# Patient Record
Sex: FEMALE | Race: WHITE | NOT HISPANIC OR LATINO | Employment: UNEMPLOYED | URBAN - NONMETROPOLITAN AREA
[De-identification: names, ages, dates, MRNs, and addresses within clinical notes are randomized per-mention and may not be internally consistent; named-entity substitution may affect disease eponyms.]

---

## 2017-02-27 ENCOUNTER — TELEPHONE (OUTPATIENT)
Dept: FAMILY MEDICINE | Facility: OTHER | Age: 47
End: 2017-02-27

## 2017-02-27 DIAGNOSIS — G89.29 CHRONIC BILATERAL LOW BACK PAIN WITHOUT SCIATICA: Primary | ICD-10-CM

## 2017-02-27 DIAGNOSIS — M54.50 CHRONIC BILATERAL LOW BACK PAIN WITHOUT SCIATICA: Primary | ICD-10-CM

## 2017-02-27 NOTE — TELEPHONE ENCOUNTER
Orders placed.  Is this allowed?  Please contact patient if not.  Please advise her that I don't typically fax over the referral unless we have a number.  Electronically signed:    Tanvir Ybarra PA-C

## 2017-02-27 NOTE — TELEPHONE ENCOUNTER
Reason for Call: Request for an order or referral: [facility]  Order or referral being requested: referral   Date needed: at your convenience    Has the patient been seen by the PCP for this problem? YES    Additional comments: no    Phone number Patient can be reached at:  Home number on file [phone] (home)    Best Time:  Anytime    Can we leave a detailed message on this number?  YES    Call taken on 2/27/2017 at 4:28 PM by Eunice Simmons

## 2017-03-02 NOTE — TELEPHONE ENCOUNTER
Sandee can you please fax the information over to [facility].  Fax [phone]  Please reference [facility] Case #[id number]

## 2017-03-08 ENCOUNTER — TRANSFERRED RECORDS (OUTPATIENT)
Dept: HEALTH INFORMATION MANAGEMENT | Facility: CLINIC | Age: 47
End: 2017-03-08

## 2017-04-27 ENCOUNTER — OFFICE VISIT (OUTPATIENT)
Dept: URGENT CARE | Facility: RETAIL CLINIC | Age: 47
End: 2017-04-27
Payer: COMMERCIAL

## 2017-04-27 VITALS — TEMPERATURE: 98.1 F | DIASTOLIC BLOOD PRESSURE: 79 MMHG | HEART RATE: 74 BPM | SYSTOLIC BLOOD PRESSURE: 125 MMHG

## 2017-04-27 DIAGNOSIS — J02.9 ACUTE PHARYNGITIS, UNSPECIFIED ETIOLOGY: Primary | ICD-10-CM

## 2017-04-27 DIAGNOSIS — J00 COMMON COLD: ICD-10-CM

## 2017-04-27 DIAGNOSIS — J30.2 SEASONAL ALLERGIC RHINITIS, UNSPECIFIED ALLERGIC RHINITIS TRIGGER: ICD-10-CM

## 2017-04-27 LAB — S PYO AG THROAT QL IA.RAPID: NORMAL

## 2017-04-27 PROCEDURE — 87880 STREP A ASSAY W/OPTIC: CPT | Mod: QW | Performed by: NURSE PRACTITIONER

## 2017-04-27 PROCEDURE — 87081 CULTURE SCREEN ONLY: CPT | Performed by: NURSE PRACTITIONER

## 2017-04-27 PROCEDURE — 99213 OFFICE O/P EST LOW 20 MIN: CPT | Performed by: NURSE PRACTITIONER

## 2017-04-27 NOTE — NURSING NOTE
"Chief Complaint   Patient presents with     Throat Pain     2 days; hard to swallow     Generalized Body Aches     neck; sore, stiff     Fatigue     feeks very worn down       Initial /79 (BP Location: Left arm)  Pulse 74  Temp 98.1  F (36.7  C) (Oral) Estimated body mass index is 39.09 kg/(m^2) as calculated from the following:    Height as of 9/22/16: 5' 6.1\" (1.679 m).    Weight as of 9/22/16: 242 lb 14.4 oz (110.2 kg).  Medication Reconciliation: complete  "
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
## 2017-04-27 NOTE — MR AVS SNAPSHOT
"              After Visit Summary   2017    Kesha David    MRN: [id number]           Patient Information     Date Of Birth          1970        Visit Information        Provider Department      2017 3:10 PM Cornelius Solitario APRN [facility]        Today's Diagnoses     Acute pharyngitis, unspecified etiology    -  1    Seasonal allergic rhinitis, unspecified allergic rhinitis trigger        Common cold           Follow-ups after your visit        Who to contact     You can reach your care team any time of the day by calling [phone].  Notification of test results:  If you have an abnormal lab result, we will notify you by phone as soon as possible.         Additional Information About Your Visit        MyChart Information     [email or web address] lets you send messages to your doctor, view your test results, renew your prescriptions, schedule appointments and more. To sign up, go to [email or web address] . Click on \"Log in\" on the left side of the screen, which will take you to the Welcome page. Then click on \"Sign up Now\" on the right side of the page.     You will be asked to enter the access code listed below, as well as some personal information. Please follow the directions to create your username and password.     Your access code is: [email or web address]  Expires: 2017  4:04 PM     Your access code will  in 90 days. If you need help or a new code, please call your [place] clinic or [phone].        Care EveryWhere ID     This is your [facility] EveryWhere ID. This could be used by other organizations to access your [place] medical records  [id number]        Your Vitals Were     Pulse Temperature                74 98.1  F (36.7  C) (Oral)           Blood Pressure from Last 3 Encounters:   17 125/79   16 114/60   16 122/60    Weight from Last 3 Encounters:   16 242 lb 14.4 oz (110.2 kg)   16 242 lb (109.8 kg)   16 239 lb (108.4 kg) "              We Performed the Following     BETA STREP GROUP A R/O CULTURE     RAPID STREP SCREEN        Primary Care Provider Office Phone # Fax #    Tanvir Peterson PA-C [phone]       [facility] [address] [facility] [address]        Thank you!     Thank you for choosing [facility]  for your care. Our goal is always to provide you with excellent care. Hearing back from our patients is one way we can continue to improve our services. Please take a few minutes to complete the written survey that you may receive in the mail after your visit with us. Thank you!             Your Updated Medication List - Protect others around you: Learn how to safely use, store and throw away your medicines at www.disposemymeds.org.          This list is accurate as of: 4/27/17  4:04 PM.  Always use your most recent med list.                   Brand Name Dispense Instructions for use    ** PATIENT ALERT **     0    Warning:  All pain medication refills must be approved by MD.       albuterol 108 (90 BASE) MCG/ACT Inhaler    PROAIR HFA/PROVENTIL HFA/VENTOLIN HFA    1 Inhaler    Inhale 1-2 puffs into the lungs every 4 hours as needed for wheezing       Biotin 1 MG Caps      Reported on 4/27/2017       cetirizine 10 MG tablet    zyrTEC     Take 10 mg by mouth daily       CYANOCOBALAMIN SL      Place 500 mcg under the tongue daily Reported on 4/27/2017       DULERA 100-5 MCG/ACT oral inhaler   Generic drug:  mometasone-formoterol      Reported on 4/27/2017       fluticasone 50 MCG/ACT spray    FLONASE    1 Package    Spray 2 sprays into both nostrils daily       multivitamin, therapeutic with minerals Tabs tablet      Take 2 tablets by mouth daily Reported on 4/27/2017       omeprazole 40 MG capsule    priLOSEC    30 capsule    Take 1 capsule (40 mg) by mouth daily       venlafaxine 37.5 MG 24 hr capsule    EFFEXOR-XR    90 capsule    Take 1 capsule (37.5 mg) by mouth daily

## 2017-04-30 LAB — BETA STREP CONFIRM: NORMAL

## 2017-07-31 ENCOUNTER — OFFICE VISIT (OUTPATIENT)
Dept: FAMILY MEDICINE | Facility: OTHER | Age: 47
End: 2017-07-31
Payer: COMMERCIAL

## 2017-07-31 VITALS
TEMPERATURE: 96.5 F | RESPIRATION RATE: 16 BRPM | HEART RATE: 76 BPM | WEIGHT: 237.3 LBS | SYSTOLIC BLOOD PRESSURE: 130 MMHG | DIASTOLIC BLOOD PRESSURE: 90 MMHG | BODY MASS INDEX: 38.14 KG/M2 | HEIGHT: 66 IN

## 2017-07-31 DIAGNOSIS — F33.0 MAJOR DEPRESSIVE DISORDER, RECURRENT EPISODE, MILD (H): Primary | ICD-10-CM

## 2017-07-31 DIAGNOSIS — K21.00 GASTROESOPHAGEAL REFLUX DISEASE WITH ESOPHAGITIS: ICD-10-CM

## 2017-07-31 DIAGNOSIS — M54.50 ACUTE BILATERAL LOW BACK PAIN WITHOUT SCIATICA: ICD-10-CM

## 2017-07-31 DIAGNOSIS — M79.10 GENERALIZED MUSCLE ACHE: ICD-10-CM

## 2017-07-31 DIAGNOSIS — Z98.84 BARIATRIC SURGERY STATUS: ICD-10-CM

## 2017-07-31 LAB
ALBUMIN SERPL-MCNC: 3.7 G/DL (ref 3.4–5)
ALP SERPL-CCNC: 65 U/L (ref 40–150)
ALT SERPL W P-5'-P-CCNC: 21 U/L (ref 0–50)
ANION GAP SERPL CALCULATED.3IONS-SCNC: 7 MMOL/L (ref 3–14)
AST SERPL W P-5'-P-CCNC: 12 U/L (ref 0–45)
BILIRUB SERPL-MCNC: 0.2 MG/DL (ref 0.2–1.3)
BUN SERPL-MCNC: 20 MG/DL (ref 7–30)
CALCIUM SERPL-MCNC: 8.8 MG/DL (ref 8.5–10.1)
CHLORIDE SERPL-SCNC: 112 MMOL/L (ref 94–109)
CO2 SERPL-SCNC: 26 MMOL/L (ref 20–32)
CREAT SERPL-MCNC: 0.74 MG/DL (ref 0.52–1.04)
ERYTHROCYTE [DISTWIDTH] IN BLOOD BY AUTOMATED COUNT: 12.4 % (ref 10–15)
GFR SERPL CREATININE-BSD FRML MDRD: 84 ML/MIN/1.7M2
GLUCOSE SERPL-MCNC: 103 MG/DL (ref 70–99)
HCT VFR BLD AUTO: 37.8 % (ref 35–47)
HGB BLD-MCNC: 13.6 G/DL (ref 11.7–15.7)
MCH RBC QN AUTO: 30.4 PG (ref 26.5–33)
MCHC RBC AUTO-ENTMCNC: 36 G/DL (ref 31.5–36.5)
MCV RBC AUTO: 85 FL (ref 78–100)
PLATELET # BLD AUTO: 235 10E9/L (ref 150–450)
POTASSIUM SERPL-SCNC: 3.6 MMOL/L (ref 3.4–5.3)
PROT SERPL-MCNC: 7.3 G/DL (ref 6.8–8.8)
RBC # BLD AUTO: 4.47 10E12/L (ref 3.8–5.2)
SODIUM SERPL-SCNC: 145 MMOL/L (ref 133–144)
WBC # BLD AUTO: 6.5 10E9/L (ref 4–11)

## 2017-07-31 PROCEDURE — 99213 OFFICE O/P EST LOW 20 MIN: CPT | Performed by: PHYSICIAN ASSISTANT

## 2017-07-31 PROCEDURE — 86038 ANTINUCLEAR ANTIBODIES: CPT | Performed by: PHYSICIAN ASSISTANT

## 2017-07-31 PROCEDURE — 36415 COLL VENOUS BLD VENIPUNCTURE: CPT | Performed by: PHYSICIAN ASSISTANT

## 2017-07-31 PROCEDURE — 86431 RHEUMATOID FACTOR QUANT: CPT | Performed by: PHYSICIAN ASSISTANT

## 2017-07-31 PROCEDURE — 86618 LYME DISEASE ANTIBODY: CPT | Performed by: PHYSICIAN ASSISTANT

## 2017-07-31 PROCEDURE — 85027 COMPLETE CBC AUTOMATED: CPT | Performed by: PHYSICIAN ASSISTANT

## 2017-07-31 PROCEDURE — 80053 COMPREHEN METABOLIC PANEL: CPT | Performed by: PHYSICIAN ASSISTANT

## 2017-07-31 RX ORDER — RABEPRAZOLE SODIUM 20 MG/1
20 TABLET, DELAYED RELEASE ORAL DAILY
Qty: 90 TABLET | Refills: 1 | Status: SHIPPED | OUTPATIENT
Start: 2017-07-31 | End: 2017-12-01

## 2017-07-31 RX ORDER — AMITRIPTYLINE HYDROCHLORIDE 10 MG/1
TABLET ORAL
Qty: 90 TABLET | Refills: 0 | Status: SHIPPED | OUTPATIENT
Start: 2017-07-31 | End: 2017-12-01

## 2017-07-31 ASSESSMENT — PAIN SCALES - GENERAL: PAINLEVEL: MILD PAIN (3)

## 2017-07-31 NOTE — LETTER
[facility]  [address] [facility] [id number] [phone]        August 7, 2017    Kesha David  [address] [address] [place] [id number]          Dear Zo Rebolledo is a copy of your labs from 7-.  We advise that you  drink plenty of water and eliminate salt from your diet.  Please recheck labs in 2 weeks.     If you have any questions or problems, please give us a call at the clinic.    Sincerely,        Tanvir Ovalle PA-C/clau,VIOLETTEN
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
## 2017-07-31 NOTE — NURSING NOTE
"Chief Complaint   Patient presents with     Arthritis     joint pain,x 1 year       Initial /90 (BP Location: Right arm, Patient Position: Chair, Cuff Size: Adult Large)  Pulse 76  Temp 96.5  F (35.8  C) (Oral)  Resp 16  Ht 5' 6\" (1.676 m)  Wt 237 lb 4.8 oz (107.6 kg)  BMI 38.3 kg/m2 Estimated body mass index is 38.3 kg/(m^2) as calculated from the following:    Height as of this encounter: 5' 6\" (1.676 m).    Weight as of this encounter: 237 lb 4.8 oz (107.6 kg).  Medication Reconciliation: complete       Bushra VILLEGAS LPN      "
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
## 2017-07-31 NOTE — LETTER
My Depression Action Plan  Name: Kesha David   Date of Birth 1970  Date: 7/31/2017    My doctor: Tanvir Peterson   My clinic: [place] [address] [facility] [address] [phone]          GREEN    ZONE   Good Control    What it looks like:     Things are going generally well. You have normal up s and down s. You may even feel depressed from time to time, but bad moods usually last less than a day.   What you need to do:  1. Continue to care for yourself (see self care plan)  2. Check your depression survival kit and update it as needed  3. Follow your physician s recommendations including any medication.  4. Do not stop taking medication unless you consult with your physician first.           YELLOW         ZONE Getting Worse    What it looks like:     Depression is starting to interfere with your life.     It may be hard to get out of bed; you may be starting to isolate yourself from others.    Symptoms of depression are starting to last most all day and this has happened for several days.     You may have suicidal thoughts but they are not constant.   What you need to do:     1. Call your care team, your response to treatment will improve if you keep your care team informed of your progress. Yellow periods are signs an adjustment may need to be made.     2. Continue your self-care, even if you have to fake it!    3. Talk to someone in your support network    4. Open up your depression survival kit           RED    ZONE Medical Alert - Get Help    What it looks like:     Depression is seriously interfering with your life.     You may experience these or other symptoms: You can t get out of bed most days, can t work or engage in other necessary activities, you have trouble taking care of basic hygiene, or basic responsibilities, thoughts of suicide or death that will not go away, self-injurious behavior.     What you need to do:  1. Call your care team and request a  same-day appointment. If they are not available (weekends or after hours) call your local crisis line, emergency room or 911.      Electronically signed by: Bushra Perez, July 31, 2017    Depression Self Care Plan / Survival Kit    Self-Care for Depression  Here s the deal. Your body and mind are really not as separate as most people think.  What you do and think affects how you feel and how you feel influences what you do and think. This means if you do things that people who feel good do, it will help you feel better.  Sometimes this is all it takes.  There is also a place for medication and therapy depending on how severe your depression is, so be sure to consult with your medical provider and/ or Behavioral Health Consultant if your symptoms are worsening or not improving.     In order to better manage my stress, I will:    Exercise  Get some form of exercise, every day. This will help reduce pain and release endorphins, the  feel good  chemicals in your brain. This is almost as good as taking antidepressants!  This is not the same as joining a gym and then never going! (they count on that by the way ) It can be as simple as just going for a walk or doing some gardening, anything that will get you moving.      Hygiene   Maintain good hygiene (Get out of bed in the morning, Make your bed, Brush your teeth, Take a shower, and Get dressed like you were going to work, even if you are unemployed).  If your clothes don't fit try to get ones that do.    Diet  I will strive to eat foods that are good for me, drink plenty of water, and avoid excessive sugar, caffeine, alcohol, and other mood-altering substances.  Some foods that are helpful in depression are: complex carbohydrates, B vitamins, flaxseed, fish or fish oil, fresh fruits and vegetables.    Psychotherapy  I agree to participate in Individual Therapy (if recommended).    Medication  If prescribed medications, I agree to take them.  Missing doses can result  in serious side effects.  I understand that drinking alcohol, or other illicit drug use, may cause potential side effects.  I will not stop my medication abruptly without first discussing it with my provider.    Staying Connected With Others  I will stay in touch with my friends, family members, and my primary care provider/team.    Use your imagination  Be creative.  We all have a creative side; it doesn t matter if it s oil painting, sand castles, or mud pies! This will also kick up the endorphins.    Witness Beauty  (AKA stop and smell the roses) Take a look outside, even in mid-winter. Notice colors, textures. Watch the squirrels and birds.     Service to others  Be of service to others.  There is always someone else in need.  By helping others we can  get out of ourselves  and remember the really important things.  This also provides opportunities for practicing all the other parts of the program.    Humor  Laugh and be silly!  Adjust your TV habits for less news and crime-drama and more comedy.    Control your stress  Try breathing deep, massage therapy, biofeedback, and meditation. Find time to relax each day.     My support system    Clinic Contact:  Phone number:    Contact 1:  Phone number:    Contact 2:  Phone number:    [employer]/:  Phone number:    Therapist:  Phone number:    Local crisis center:    Phone number:    Other community support:  Phone number:

## 2017-08-01 LAB — ANA SER QL IA: NORMAL

## 2017-08-01 ASSESSMENT — PATIENT HEALTH QUESTIONNAIRE - PHQ9: SUM OF ALL RESPONSES TO PHQ QUESTIONS 1-9: 0

## 2017-08-02 ENCOUNTER — HOSPITAL ENCOUNTER (OUTPATIENT)
Dept: PHYSICAL THERAPY | Facility: OTHER | Age: 47
Setting detail: THERAPIES SERIES
End: 2017-08-02
Attending: PHYSICIAN ASSISTANT
Payer: COMMERCIAL

## 2017-08-02 LAB — B BURGDOR IGG+IGM SER QL: 0.07 (ref 0–0.89)

## 2017-08-02 PROCEDURE — 97161 PT EVAL LOW COMPLEX 20 MIN: CPT | Mod: GP | Performed by: PHYSICAL THERAPIST

## 2017-08-02 PROCEDURE — 97110 THERAPEUTIC EXERCISES: CPT | Mod: GP | Performed by: PHYSICAL THERAPIST

## 2017-08-02 PROCEDURE — 40000718 ZZHC STATISTIC PT DEPARTMENT ORTHO VISIT: Performed by: PHYSICAL THERAPIST

## 2017-08-02 NOTE — PROGRESS NOTES
08/02/17 1300   General Information   Type of Visit Initial OP Ortho PT Evaluation   Start of Care Date 08/02/17   Referring Physician lyubov DIXON   Patient/Family Goals Statement pain and tightness due to fribro   Orders Evaluate and Treat   Date of Order 07/31/17   Insurance Type Other  (medica)   Medical Diagnosis acute B LBP without sciatica M54.5 generalized muscle ache M79.1   Surgical/Medical history reviewed Yes   Precautions/Limitations no known precautions/limitations   Body Part(s)   Body Part(s) Lumbar Spine/SI   Presentation and Etiology   Pertinent history of current problem (include personal factors and/or comorbidities that impact the POC) patient reports that she was told she has fibro this week . does water areobics  3x and then swims 2-3 x week .  does not like to exercises but tolerates water . feels overall very tight everywhere . sleeps poorly because of pain everywhere. B UE tend to go to sleep when she lays on that side , arms also go asleep when driving . has had PT and chiropractor and only massage helps , has had low back pain since 1996 , back feels frozen    Impairments A. Pain;E. Decreased flexibility   Functional Limitations perform activities of daily living;perform required work activities;perform desired leisure / sports activities   Symptom Location everywhere    Onset date of current episode/exacerbation 05/02/17   Chronicity Chronic   Pain rating (0-10 point scale) Best (/10);Worst (/10)   Best (/10) 0/10   Worst (/10) 8/10   Pain quality A. Sharp;B. Dull;C. Aching   Frequency of pain/symptoms C. With activity   Pain/symptoms exacerbated by K. Home tasks;L. Work tasks;J. ADL   Pain/symptoms eased by C. Rest  (pool , hot tub )   Progression of symptoms since onset: Worsened   Prior Level of Function   Functional Level Prior Comment pool class and stretching    Current Level of Function   Current Community Support Family/friend caregiver   Patient role/employment history A.  Employed   Fall Risk Screen   Fall screen completed by PT   Per patient - Fall 2 or more times in past year? No   Per patient - Fall with injury in past year? No   Is patient a fall risk? No   System Outcome Measures   Outcome Measures Low Back Pain (see Oswestry and Destiny)   Lumbar Spine/SI Objective Findings   Posture forward head and rounded shoulder    Flexion ROM full tight    Extension ROM full tight    Right Side Bending ROM full tight   Left Side Bending ROM full tight    Pelvic Screen neg   Hip Screen neg   Lumbar/Hip/Knee/Foot Strength Comments good strength able to stand on heels and toes   Hamstring Flexibility B 10    SLR neg   Planned Therapy Interventions   Planned Therapy Interventions ADL retraining;strengthening;stretching   Planned Therapy Interventions Comment patient seen for c/o pain and tight everywhere , Rx is instruction in body mechanics and HEP for gentle stretching and core stabilization    Clinical Impression   Criteria for Skilled Therapeutic Interventions Met yes, treatment indicated   PT Diagnosis low back pain , tightness and weakness B UE and LE    Functional limitations due to impairments GOOD 15.56   Clinical Presentation Stable/Uncomplicated   Clinical Decision Making (Complexity) Low complexity   Predicted Duration of Therapy Intervention (days/wks) every other week x 2 months to establish on HEP    Risk & Benefits of therapy have been explained Yes   Patient, Family & other staff in agreement with plan of care Yes   Education Assessment   Preferred Learning Style Listening;Reading;Demonstration   Barriers to Learning No barriers   ORTHO GOALS   PT Ortho Eval Goals 1;2   Ortho Goal 1   Goal Identifier 1   Goal Description patient to be compliant with HEP 5 of 7 days to help manage her symptoms   Target Date 10/02/17   Ortho Goal 2   Goal Identifier 2   Goal Description patient to have overall decrease in pain level currently 0-8/10 goal is 0-4/10   Target Date 10/02/17   Total  Evaluation Time   Total Evaluation Time 15

## 2017-08-02 NOTE — PROGRESS NOTES
08/02/17 1300   [facility] STarT Back Screening Tool (  [facility] 01/08/07, Funded by [employer]) Used with permission   1  My back pain has spread down my leg(s) at some time in the last 2 weeks (!) 1   2  I have had pain in the shoulder or neck at some time in the last 2 weeks (!) 1   3  I have only walked short distances because of my back pain (!) 1   4  In the last 2 weeks, I have dressed more slowly than usual because of back pain (!) 1   5  It s not really safe for a person with a condition like mine to be physically active 0   6  Worrying thoughts have been going through my mind a lot of the time 0   7  I feel that my back pain is terrible and it s never going to get any better 0   8  In general I have not enjoyed all the things I used to enjoy 0   9.  Overall, how bothersome has your back pain been in the last 2 weeks? 1   [facility] STarT Back Tool Scores    Sub-Score (Q5-9) 1   Total Score (all 9) 5   Risk: MEDIUM

## 2017-08-02 NOTE — PROGRESS NOTES
08/02/17 1300   Oswestry Disability Index (GOOD   Clint Sergo 1980 Version 2.1a, All rights reserved)   Section 1 - Pain intensity 0   Section 2 - Personal care (washing, dressing, etc.)  1   Section 3 - Lifting 1   Section 4 - Walking 1   Section 5 - Sitting 1   Section 6 - Standing 1   Section 7 - Sleeping 1  (sleeps on B sides and wakes due to UE falling asleep )   Section 8 - Sex life (if applicable) -1   Section 9 - Social life 0   Section 10 - Traveling 1   Sum 7   Count 9   Oswestry Score (%) 15.56 %

## 2017-08-03 LAB — RHEUMATOID FACT SER NEPH-ACNC: <20 IU/ML (ref 0–20)

## 2017-08-17 ENCOUNTER — TELEPHONE (OUTPATIENT)
Dept: FAMILY MEDICINE | Facility: OTHER | Age: 47
End: 2017-08-17

## 2017-08-17 NOTE — TELEPHONE ENCOUNTER
Reason for Call:  Other regarding medication dosage    Detailed comments: Kesha is currently on amitriptyline 10 mg 3 tablets daily. Kesha said the medication isn't working yet, she has no motivation, still has pain yet, she said she cried a lot with it last week. Kesha is asking if the medication dosage can be increased. Kesha is aware Tanvir is out of clinic and is asking if another provider will address this question in Tanvir's absence. Kesha has to start work next week and want's the medication to be more effective prior to next week.    Phone Number Patient can be reached at: Home number on file [phone] (home)    Best Time:     Can we leave a detailed message on this number? YES    Call taken on 8/17/2017 at 11:27 AM by Monica Petersen

## 2017-08-17 NOTE — TELEPHONE ENCOUNTER
She could gradually increase the amitryptylene to 2 tablets times daily   ( increase by one a day until she gets up to 2 tablets 3 times daily)

## 2017-08-22 NOTE — ADDENDUM NOTE
Encounter addended by: Divya Ibrahim, PT on: 8/22/2017  1:26 PM<BR>     Actions taken: Episode resolved, Sign clinical note

## 2017-08-22 NOTE — PROGRESS NOTES
Outpatient Physical Therapy Discharge Note     Patient: Kesha David  : 1970    Beginning/End Dates of Reporting Period:  2017 to 2017    Referring Provider: lyubov Cho Diagnosis: back pain      Client Self Report:      Objective Measurements:  Objective Measure: pain 0-8/10 mauri med , GOOD 16    Outcome Measures (most recent score):Mauri STarT Sub-Score (Q5-9): 1  Mauri STarT Total Score (all 9): 5  Oswestry Score (%): 15.56 %      Goals:  Goal Identifier 1   Goal Description patient to be compliant with HEP 5 of 7 days to help manage her symptoms   Target Date 10/02/17   Date Met      Progress:     Goal Identifier 2   Goal Description patient to have overall decrease in pain level currently 0-8/10 goal is 0-4/10   Target Date 10/02/17   Date Met      Progress:       Progress Toward Goals:   Progress this reporting period: patient seen for evaluation and instruction in HEP , she called and cancelled her follow up on 2017 and as of 2017 she has made no further contact with PT             Plan:  Discharge from therapy. If patient should call in the next 1 month will resume therapy     Discharge:    Reason for Discharge: Patient chooses to discontinue therapy.  Patient has failed to schedule further appointments.    Equipment Issued: none    Discharge Plan: Patient to continue home program.

## 2017-10-19 DIAGNOSIS — J98.01 ACUTE BRONCHOSPASM: ICD-10-CM

## 2017-10-19 RX ORDER — ALBUTEROL SULFATE 90 UG/1
1-2 AEROSOL, METERED RESPIRATORY (INHALATION) EVERY 4 HOURS PRN
Qty: 1 INHALER | Refills: 0 | Status: SHIPPED | OUTPATIENT
Start: 2017-10-19 | End: 2017-11-01

## 2017-10-19 NOTE — TELEPHONE ENCOUNTER
Ventolin      Last Written Prescription Date: 2015  Last Fill Quantity: 18,  # refills: 0   Last Office Visit with [facility], [facility] or [facility] prescribing provider: 07/31/17    Patient is also requesting an RX for Dulera.  She previously got this from her allergist but would you to fill.    Thanks,  Merary Armendariz, Technician  [facility]  [phone]

## 2017-10-31 ENCOUNTER — TELEPHONE (OUTPATIENT)
Dept: FAMILY MEDICINE | Facility: OTHER | Age: 47
End: 2017-10-31

## 2017-10-31 NOTE — TELEPHONE ENCOUNTER
Reason for Call:  Same Day Appointment, Requested Provider:  Tanvir Peterson    PCP: Tanvir Peterson    Reason for visit: allergies sore throat body pain    Duration of symptoms: 2 weeks    Have you been treated for this in the past?allergies yes, sore throat and pain no    Additional comments: is wondering if she could be worked in today with Tanvir. declined another provider    Can we leave a detailed message on this number? YES    Phone number patient can be reached at: Home number on file [phone] (home)    Best Time: any    Call taken on 10/31/2017 at 1:59 PM by Gabriela Cabrera

## 2017-11-01 ENCOUNTER — OFFICE VISIT (OUTPATIENT)
Dept: FAMILY MEDICINE | Facility: OTHER | Age: 47
End: 2017-11-01
Payer: COMMERCIAL

## 2017-11-01 VITALS
RESPIRATION RATE: 12 BRPM | WEIGHT: 248.1 LBS | OXYGEN SATURATION: 99 % | BODY MASS INDEX: 40.04 KG/M2 | TEMPERATURE: 97.9 F | SYSTOLIC BLOOD PRESSURE: 120 MMHG | DIASTOLIC BLOOD PRESSURE: 100 MMHG | HEART RATE: 98 BPM

## 2017-11-01 DIAGNOSIS — J45.30 MILD PERSISTENT ASTHMA WITHOUT COMPLICATION: ICD-10-CM

## 2017-11-01 DIAGNOSIS — J98.01 ACUTE BRONCHOSPASM: ICD-10-CM

## 2017-11-01 DIAGNOSIS — E66.9 OBESITY (BMI 35.0-39.9 WITHOUT COMORBIDITY): ICD-10-CM

## 2017-11-01 DIAGNOSIS — R07.0 THROAT PAIN: Primary | ICD-10-CM

## 2017-11-01 DIAGNOSIS — J01.90 ACUTE SINUSITIS WITH SYMPTOMS > 10 DAYS: ICD-10-CM

## 2017-11-01 LAB
DEPRECATED S PYO AG THROAT QL EIA: NORMAL
SPECIMEN SOURCE: NORMAL

## 2017-11-01 PROCEDURE — 87880 STREP A ASSAY W/OPTIC: CPT | Performed by: FAMILY MEDICINE

## 2017-11-01 PROCEDURE — 87081 CULTURE SCREEN ONLY: CPT | Performed by: FAMILY MEDICINE

## 2017-11-01 PROCEDURE — 99214 OFFICE O/P EST MOD 30 MIN: CPT | Performed by: FAMILY MEDICINE

## 2017-11-01 RX ORDER — VENLAFAXINE 75 MG/1
75 TABLET ORAL
COMMUNITY
End: 2017-12-01

## 2017-11-01 RX ORDER — ALBUTEROL SULFATE 90 UG/1
1-2 AEROSOL, METERED RESPIRATORY (INHALATION) EVERY 4 HOURS PRN
Qty: 1 INHALER | Refills: 0 | Status: SHIPPED | OUTPATIENT
Start: 2017-11-01 | End: 2019-06-14

## 2017-11-01 ASSESSMENT — ANXIETY QUESTIONNAIRES
6. BECOMING EASILY ANNOYED OR IRRITABLE: SEVERAL DAYS
2. NOT BEING ABLE TO STOP OR CONTROL WORRYING: SEVERAL DAYS
5. BEING SO RESTLESS THAT IT IS HARD TO SIT STILL: NOT AT ALL
IF YOU CHECKED OFF ANY PROBLEMS ON THIS QUESTIONNAIRE, HOW DIFFICULT HAVE THESE PROBLEMS MADE IT FOR YOU TO DO YOUR WORK, TAKE CARE OF THINGS AT HOME, OR GET ALONG WITH OTHER PEOPLE: SOMEWHAT DIFFICULT
7. FEELING AFRAID AS IF SOMETHING AWFUL MIGHT HAPPEN: NOT AT ALL
GAD7 TOTAL SCORE: 4
1. FEELING NERVOUS, ANXIOUS, OR ON EDGE: SEVERAL DAYS
3. WORRYING TOO MUCH ABOUT DIFFERENT THINGS: SEVERAL DAYS

## 2017-11-01 ASSESSMENT — PATIENT HEALTH QUESTIONNAIRE - PHQ9: 5. POOR APPETITE OR OVEREATING: NOT AT ALL

## 2017-11-01 ASSESSMENT — PAIN SCALES - GENERAL: PAINLEVEL: MODERATE PAIN (4)

## 2017-11-01 NOTE — NURSING NOTE
"Chief Complaint   Patient presents with     Pharyngitis       Initial BP (!) 120/100 (BP Location: Left arm, Patient Position: Chair, Cuff Size: Adult Large)  Pulse 98  Temp 97.9  F (36.6  C) (Temporal)  Resp 12  Wt 248 lb 1.6 oz (112.5 kg)  LMP 09/23/2017  SpO2 99%  BMI 40.04 kg/m2 Estimated body mass index is 40.04 kg/(m^2) as calculated from the following:    Height as of 7/31/17: 5' 6\" (1.676 m).    Weight as of this encounter: 248 lb 1.6 oz (112.5 kg).  Medication Reconciliation: complete     Purnima Bonilla MA 11/1/2017  3:32 PM            "
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
## 2017-11-02 LAB
BACTERIA SPEC CULT: NORMAL
SPECIMEN SOURCE: NORMAL

## 2017-11-02 ASSESSMENT — ANXIETY QUESTIONNAIRES: GAD7 TOTAL SCORE: 4

## 2017-11-02 ASSESSMENT — ASTHMA QUESTIONNAIRES: ACT_TOTALSCORE: 12

## 2017-12-01 ENCOUNTER — OFFICE VISIT (OUTPATIENT)
Dept: FAMILY MEDICINE | Facility: OTHER | Age: 47
End: 2017-12-01
Payer: COMMERCIAL

## 2017-12-01 VITALS
TEMPERATURE: 97.1 F | HEIGHT: 66 IN | DIASTOLIC BLOOD PRESSURE: 82 MMHG | OXYGEN SATURATION: 100 % | HEART RATE: 85 BPM | WEIGHT: 240.5 LBS | SYSTOLIC BLOOD PRESSURE: 122 MMHG | BODY MASS INDEX: 38.65 KG/M2 | RESPIRATION RATE: 20 BRPM

## 2017-12-01 DIAGNOSIS — J45.30 MILD PERSISTENT ASTHMA WITHOUT COMPLICATION: ICD-10-CM

## 2017-12-01 DIAGNOSIS — K21.00 GASTROESOPHAGEAL REFLUX DISEASE WITH ESOPHAGITIS: ICD-10-CM

## 2017-12-01 DIAGNOSIS — F41.1 ANXIETY STATE: ICD-10-CM

## 2017-12-01 DIAGNOSIS — I10 HYPERTENSION GOAL BP (BLOOD PRESSURE) < 140/90: ICD-10-CM

## 2017-12-01 DIAGNOSIS — M25.50 CHRONIC JOINT PAIN: ICD-10-CM

## 2017-12-01 DIAGNOSIS — G89.29 CHRONIC JOINT PAIN: ICD-10-CM

## 2017-12-01 DIAGNOSIS — Z76.89 ESTABLISHING CARE WITH NEW DOCTOR, ENCOUNTER FOR: Primary | ICD-10-CM

## 2017-12-01 DIAGNOSIS — F33.0 MAJOR DEPRESSIVE DISORDER, RECURRENT EPISODE, MILD (H): ICD-10-CM

## 2017-12-01 PROCEDURE — 99214 OFFICE O/P EST MOD 30 MIN: CPT | Performed by: NURSE PRACTITIONER

## 2017-12-01 RX ORDER — DULOXETIN HYDROCHLORIDE 20 MG/1
20 CAPSULE, DELAYED RELEASE ORAL 2 TIMES DAILY
Qty: 60 CAPSULE | Refills: 1 | Status: SHIPPED | OUTPATIENT
Start: 2017-12-01 | End: 2018-04-16

## 2017-12-01 ASSESSMENT — ANXIETY QUESTIONNAIRES
1. FEELING NERVOUS, ANXIOUS, OR ON EDGE: NEARLY EVERY DAY
GAD7 TOTAL SCORE: 8
3. WORRYING TOO MUCH ABOUT DIFFERENT THINGS: SEVERAL DAYS
7. FEELING AFRAID AS IF SOMETHING AWFUL MIGHT HAPPEN: NOT AT ALL
IF YOU CHECKED OFF ANY PROBLEMS ON THIS QUESTIONNAIRE, HOW DIFFICULT HAVE THESE PROBLEMS MADE IT FOR YOU TO DO YOUR WORK, TAKE CARE OF THINGS AT HOME, OR GET ALONG WITH OTHER PEOPLE: SOMEWHAT DIFFICULT
6. BECOMING EASILY ANNOYED OR IRRITABLE: MORE THAN HALF THE DAYS
2. NOT BEING ABLE TO STOP OR CONTROL WORRYING: SEVERAL DAYS
5. BEING SO RESTLESS THAT IT IS HARD TO SIT STILL: NOT AT ALL

## 2017-12-01 ASSESSMENT — PATIENT HEALTH QUESTIONNAIRE - PHQ9
SUM OF ALL RESPONSES TO PHQ QUESTIONS 1-9: 9
5. POOR APPETITE OR OVEREATING: SEVERAL DAYS
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
## 2017-12-01 NOTE — LETTER
My Asthma Action Plan  Name: Kesha David   YOB: 1970  Date: 12/1/2017   My doctor: MARANDA Perez CNP   My clinic: [facility]        My Control Medicine: Fluticasone + salmeterol (Advair) -  Diskus 250/50 mcg .  My Rescue Medicine: Albuterol (Proair/Ventolin/Proventil) inhaler .   My Asthma Severity: mild persistent  Avoid your asthma triggers: pollens and animal dander  pollens  animal dander  insects/rodents  she was evaluated by [facility] last summer in [place].            GREEN ZONE   Good Control    I feel good    No cough or wheeze    Can work, sleep and play without asthma symptoms       Take your asthma control medicine every day.     1. If exercise triggers your asthma, take your rescue medication    15 minutes before exercise or sports, and    During exercise if you have asthma symptoms  2. Spacer to use with inhaler: If you have a spacer, make sure to use it with your inhaler             YELLOW ZONE Getting Worse  I have ANY of these:    I do not feel good    Cough or wheeze    Chest feels tight    Wake up at night   1. Keep taking your Green Zone medications  2. Start taking your rescue medicine:    every 20 minutes for up to 1 hour. Then every 4 hours for 24-48 hours.  3. If you stay in the Yellow Zone for more than 12-24 hours, contact your doctor.  4. If you do not return to the Green Zone in 12-24 hours or you get worse, start taking your oral steroid medicine if prescribed by your provider.           RED ZONE Medical Alert - Get Help  I have ANY of these:    I feel awful    Medicine is not helping    Breathing getting harder    Trouble walking or talking    Nose opens wide to breathe       1. Take your rescue medicine NOW  2. If your provider has prescribed an oral steroid medicine, start taking it NOW  3. Call your doctor NOW  4. If you are still in the Red Zone after 20 minutes and you have not reached your doctor:    Take your rescue medicine  again and    Call 911 or go to the emergency room right away    See your regular doctor within 2 weeks of an Emergency Room or Urgent Care visit for follow-up treatment.        Electronically signed by: Vidya Stokes, December 1, 2017    Annual Reminders:  Meet with Asthma Educator,  Flu Shot in the Fall, consider Pneumonia Vaccination for patients with asthma (aged 19 and older).    Pharmacy: [address]                    Asthma Triggers  How To Control Things That Make Your Asthma Worse    Triggers are things that make your asthma worse.  Look at the list below to help you find your triggers and what you can do about them.  You can help prevent asthma flare-ups by staying away from your triggers.      Trigger                                                          What you can do   Cigarette Smoke  Tobacco smoke can make asthma worse. Do not allow smoking in your home, car or around you.  Be sure no one smokes at a child s day care or school.  If you smoke, ask your health care provider for ways to help you quit.  Ask family members to quit too.  Ask your health care provider for a referral to Quit Plan to help you quit smoking, or call 0-479-495-PLAN.     Colds, Flu, Bronchitis  These are common triggers of asthma. Wash your hands often.  Don t touch your eyes, nose or mouth.  Get a flu shot every year.     Dust Mites  These are tiny bugs that live in cloth or carpet. They are too small to see. Wash sheets and blankets in hot water every week.   Encase pillows and mattress in dust mite proof covers.  Avoid having carpet if you can. If you have carpet, vacuum weekly.   Use a dust mask and HEPA vacuum.   Pollen and Outdoor Mold  Some people are allergic to trees, grass, or weed pollen, or molds. Try to keep your windows closed.  Limit time out doors when pollen count is high.   Ask you health care provider about taking medicine during allergy season.     Animal  Dander  Some people are allergic to skin flakes, urine or saliva from pets with fur or feathers. Keep pets with fur or feathers out of your home.    If you can t keep the pet outdoors, then keep the pet out of your bedroom.  Keep the bedroom door closed.  Keep pets off cloth furniture and away from stuffed toys.     Mice, Rats, and Cockroaches  Some people are allergic to the waste from these pests.   Cover food and garbage.  Clean up spills and food crumbs.  Store grease in the refrigerator.   Keep food out of the bedroom.   Indoor Mold  This can be a trigger if your home has high moisture. Fix leaking faucets, pipes, or other sources of water.   Clean moldy surfaces.  Dehumidify basement if it is damp and smelly.   Smoke, Strong Odors, and Sprays  These can reduce air quality. Stay away from strong odors and sprays, such as perfume, powder, hair spray, paints, smoke incense, paint, cleaning products, candles and new carpet.   Exercise or Sports  Some people with asthma have this trigger. Be active!  Ask your doctor about taking medicine before sports or exercise to prevent symptoms.    Warm up for 5-10 minutes before and after sports or exercise.     Other Triggers of Asthma  Cold air:  Cover your nose and mouth with a scarf.  Sometimes laughing or crying can be a trigger.  Some medicines and food can trigger asthma.
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
## 2017-12-01 NOTE — NURSING NOTE
"Chief Complaint   Patient presents with     Establish Care     asthma, dep/anx, htn     Health Maintenance     ACT, AAP, mammo, declines flu shot       Initial /82  Pulse 85  Temp 97.1  F (36.2  C) (Tympanic)  Resp 20  Ht 5' 6\" (1.676 m)  Wt 240 lb 8 oz (109.1 kg)  LMP 10/20/2017 (Approximate)  SpO2 100%  Breastfeeding? No  BMI 38.82 kg/m2 Estimated body mass index is 38.82 kg/(m^2) as calculated from the following:    Height as of this encounter: 5' 6\" (1.676 m).    Weight as of this encounter: 240 lb 8 oz (109.1 kg).  Medication Reconciliation: complete   ................Abel Stokes LPN,   December 1, 2017,      7:51 AM,   [facility]    "

## 2017-12-02 ASSESSMENT — ANXIETY QUESTIONNAIRES: GAD7 TOTAL SCORE: 8

## 2017-12-02 ASSESSMENT — ASTHMA QUESTIONNAIRES: ACT_TOTALSCORE: 24

## 2018-02-01 ENCOUNTER — MYC REFILL (OUTPATIENT)
Dept: FAMILY MEDICINE | Facility: OTHER | Age: 48
End: 2018-02-01

## 2018-02-01 DIAGNOSIS — J45.30 MILD PERSISTENT ASTHMA WITHOUT COMPLICATION: ICD-10-CM

## 2018-02-01 NOTE — TELEPHONE ENCOUNTER
Message from [employer]:  Original authorizing provider: MD Kesha Madsen would like a refill of the following medications:  fluticasone-salmeterol (ADVAIR) 250-50 MCG/DOSE diskus inhaler [Bill Justice MD]    Preferred pharmacy: [place], [place] - [address]     Comment:

## 2018-02-01 NOTE — TELEPHONE ENCOUNTER
"Requested Prescriptions   Pending Prescriptions Disp Refills     fluticasone-salmeterol (ADVAIR) 250-50 MCG/DOSE diskus inhaler 1 Inhaler 1     Sig: Inhale 1 puff into the lungs 2 times daily    Inhaled Steroids Protocol Passed    2/1/2018  8:12 AM       Passed - Patient is age 12 or older       Passed - Asthma control test 20 or greater in last 6 months    Please review ACT score.          Passed - Recent (6 mo) or future visit with authorizing provider's specialty    Patient had office visit in the last 6 months or has a visit in the next 30 days with authorizing provider.  See \"Patient Info\" tab in inbasket, or \"Choose Columns\" in Meds & Orders section of the refill encounter.              "

## 2018-02-02 DIAGNOSIS — J45.30 MILD PERSISTENT ASTHMA WITHOUT COMPLICATION: ICD-10-CM

## 2018-02-02 NOTE — TELEPHONE ENCOUNTER
Prescription was sent 2/2/18 for #1 inhaler with 1 refill.  Pharmacy notified via E-Prescribe refusal.     Lizette Lowe RN  [facility]

## 2018-02-02 NOTE — TELEPHONE ENCOUNTER
Prescription approved per [facility] Refill Protocol.    Lizette Lowe RN  [facility]

## 2018-02-02 NOTE — TELEPHONE ENCOUNTER
ACT Total Scores 7/21/2016 11/1/2017 12/1/2017   ACT TOTAL SCORE (Goal Greater than or Equal to 20) 22 12 24   In the past 12 months, how many times did you visit the emergency room for your asthma without being admitted to the hospital? 0 0 0   In the past 12 months, how many times were you hospitalized overnight because of your asthma? 0 0 0

## 2018-02-02 NOTE — TELEPHONE ENCOUNTER
"Requested Prescriptions   Pending Prescriptions Disp Refills     ADVAIR DISKUS 250-50 MCG/DOSE diskus inhaler [Pharmacy Med Name: ADVAIR DISKUS 250-50MCG/DOSE AEPB]  1     Sig: INHALE 1 PUFF INTO THE LUNGS TWO TIMES A DAY    Inhaled Steroids Protocol Passed    2/2/2018  8:28 AM       Passed - Patient is age 12 or older       Passed - Asthma control test 20 or greater in last 6 months    Please review ACT score.          Passed - Recent (6 mo) or future visit with authorizing provider's specialty    Patient had office visit in the last 6 months or has a visit in the next 30 days with authorizing provider.  See \"Patient Info\" tab in inbasket, or \"Choose Columns\" in Meds & Orders section of the refill encounter.            ACT Total Scores 7/21/2016 11/1/2017 12/1/2017   ACT TOTAL SCORE (Goal Greater than or Equal to 20) 22 12 24   In the past 12 months, how many times did you visit the emergency room for your asthma without being admitted to the hospital? 0 0 0   In the past 12 months, how many times were you hospitalized overnight because of your asthma? 0 0 0     "

## 2018-02-15 ENCOUNTER — OFFICE VISIT (OUTPATIENT)
Dept: FAMILY MEDICINE | Facility: OTHER | Age: 48
End: 2018-02-15
Payer: COMMERCIAL

## 2018-02-15 VITALS
WEIGHT: 242 LBS | HEART RATE: 92 BPM | TEMPERATURE: 97.6 F | DIASTOLIC BLOOD PRESSURE: 88 MMHG | HEIGHT: 66 IN | OXYGEN SATURATION: 100 % | BODY MASS INDEX: 38.89 KG/M2 | SYSTOLIC BLOOD PRESSURE: 130 MMHG | RESPIRATION RATE: 20 BRPM

## 2018-02-15 DIAGNOSIS — E66.9 OBESITY WITHOUT SERIOUS COMORBIDITY, UNSPECIFIED CLASSIFICATION, UNSPECIFIED OBESITY TYPE: ICD-10-CM

## 2018-02-15 DIAGNOSIS — F33.0 MAJOR DEPRESSIVE DISORDER, RECURRENT EPISODE, MILD (H): ICD-10-CM

## 2018-02-15 DIAGNOSIS — F41.1 ANXIETY STATE: ICD-10-CM

## 2018-02-15 DIAGNOSIS — M79.7 FIBROMYALGIA: Primary | ICD-10-CM

## 2018-02-15 PROBLEM — E66.01 MORBID OBESITY (H): Status: ACTIVE | Noted: 2018-02-15

## 2018-02-15 PROCEDURE — 99214 OFFICE O/P EST MOD 30 MIN: CPT | Performed by: NURSE PRACTITIONER

## 2018-02-15 RX ORDER — PREGABALIN 50 MG/1
50 CAPSULE ORAL 2 TIMES DAILY
Qty: 60 CAPSULE | Refills: 1 | Status: SHIPPED | OUTPATIENT
Start: 2018-02-15 | End: 2018-04-16

## 2018-02-15 ASSESSMENT — ANXIETY QUESTIONNAIRES
GAD7 TOTAL SCORE: 0
5. BEING SO RESTLESS THAT IT IS HARD TO SIT STILL: NOT AT ALL
1. FEELING NERVOUS, ANXIOUS, OR ON EDGE: NOT AT ALL
3. WORRYING TOO MUCH ABOUT DIFFERENT THINGS: NOT AT ALL
7. FEELING AFRAID AS IF SOMETHING AWFUL MIGHT HAPPEN: NOT AT ALL
6. BECOMING EASILY ANNOYED OR IRRITABLE: NOT AT ALL
IF YOU CHECKED OFF ANY PROBLEMS ON THIS QUESTIONNAIRE, HOW DIFFICULT HAVE THESE PROBLEMS MADE IT FOR YOU TO DO YOUR WORK, TAKE CARE OF THINGS AT HOME, OR GET ALONG WITH OTHER PEOPLE: NOT DIFFICULT AT ALL
2. NOT BEING ABLE TO STOP OR CONTROL WORRYING: NOT AT ALL

## 2018-02-15 ASSESSMENT — PATIENT HEALTH QUESTIONNAIRE - PHQ9: 5. POOR APPETITE OR OVEREATING: NOT AT ALL

## 2018-02-15 NOTE — PATIENT INSTRUCTIONS
Work on getting daily exercise.     Start Lyrica once a day for 1 week, then increase to twice a day after that    Start Contrave once a day for 1 week, then increase to twice a day after that.     Follow up in 1 month for recheck.
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
## 2018-02-15 NOTE — NURSING NOTE
"Chief Complaint   Patient presents with     Recheck Medication     cymbalta       Initial /88  Pulse 92  Temp 97.6  F (36.4  C) (Tympanic)  Resp 20  Ht 5' 6\" (1.676 m)  Wt 242 lb (109.8 kg)  SpO2 100%  BMI 39.06 kg/m2 Estimated body mass index is 39.06 kg/(m^2) as calculated from the following:    Height as of this encounter: 5' 6\" (1.676 m).    Weight as of this encounter: 242 lb (109.8 kg).  Medication Reconciliation: complete   ................Abel Stokes LPN,   February 15, 2018,      3:25 PM,   [facility]    "

## 2018-02-16 ASSESSMENT — ANXIETY QUESTIONNAIRES: GAD7 TOTAL SCORE: 0

## 2018-02-16 ASSESSMENT — PATIENT HEALTH QUESTIONNAIRE - PHQ9: SUM OF ALL RESPONSES TO PHQ QUESTIONS 1-9: 1

## 2018-04-16 ENCOUNTER — OFFICE VISIT (OUTPATIENT)
Dept: FAMILY MEDICINE | Facility: OTHER | Age: 48
End: 2018-04-16
Payer: COMMERCIAL

## 2018-04-16 VITALS
SYSTOLIC BLOOD PRESSURE: 112 MMHG | TEMPERATURE: 96 F | WEIGHT: 238 LBS | OXYGEN SATURATION: 98 % | HEART RATE: 89 BPM | DIASTOLIC BLOOD PRESSURE: 80 MMHG | BODY MASS INDEX: 38.41 KG/M2

## 2018-04-16 DIAGNOSIS — S33.6XXA SPRAIN OF SACROILIAC LIGAMENT, INITIAL ENCOUNTER: Primary | ICD-10-CM

## 2018-04-16 DIAGNOSIS — E66.01 MORBID OBESITY (H): ICD-10-CM

## 2018-04-16 PROCEDURE — 99213 OFFICE O/P EST LOW 20 MIN: CPT | Performed by: INTERNAL MEDICINE

## 2018-04-16 RX ORDER — METHYLPREDNISOLONE 4 MG
TABLET, DOSE PACK ORAL
Qty: 21 TABLET | Refills: 0 | Status: SHIPPED | OUTPATIENT
Start: 2018-04-16 | End: 2018-05-16

## 2018-04-16 ASSESSMENT — PAIN SCALES - GENERAL: PAINLEVEL: SEVERE PAIN (6)
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
## 2018-04-16 NOTE — NURSING NOTE
"Chief Complaint   Patient presents with     Back Pain       Initial /80 (BP Location: Right arm, Patient Position: Chair, Cuff Size: Adult Regular)  Pulse 89  Temp 96  F (35.6  C) (Temporal)  Wt 238 lb (108 kg)  SpO2 98%  Breastfeeding? No  BMI 38.41 kg/m2 Estimated body mass index is 38.41 kg/(m^2) as calculated from the following:    Height as of 2/15/18: 5' 6\" (1.676 m).    Weight as of this encounter: 238 lb (108 kg).  Medication Reconciliation: complete  Health Maintenance reviewed at today's visit patient asked to schedule/complete:   Breast Cancer:  Patient agrees to schedule   Fanta HUTCHINSON      "
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
## 2018-05-11 DIAGNOSIS — J45.30 MILD PERSISTENT ASTHMA WITHOUT COMPLICATION: ICD-10-CM

## 2018-05-11 NOTE — TELEPHONE ENCOUNTER
"Requested Prescriptions   Pending Prescriptions Disp Refills     ADVAIR DISKUS 250-50 MCG/DOSE diskus inhaler [Pharmacy Med Name: ADVAIR DISKUS 250-50MCG/DOSE AEPB]  1     Sig: INHALE 1 PUFF INTO THE LUNGS TWO TIMES A DAY    Inhaled Steroids Protocol Passed    5/11/2018  4:58 AM       Passed - Patient is age 12 or older       Passed - Asthma control assessment score within normal limits in last 6 months    Please review ACT score.          Passed - Recent (6 mo) or future (30 days) visit within the authorizing provider's specialty    Patient had office visit in the last 6 months or has a visit in the next 30 days with authorizing provider or within the authorizing provider's specialty.  See \"Patient Info\" tab in inbasket, or \"Choose Columns\" in Meds & Orders section of the refill encounter.              Last Written Prescription Date:  2/2/18  Last Fill Quantity: 1 inhaler ,  # refills: 1   Last Office [place] [facility] or [facility] prescribing provider:  2/15/18   Future Office Visit:       "
ACT Total Scores 7/21/2016 11/1/2017 12/1/2017   ACT TOTAL SCORE (Goal Greater than or Equal to 20) 22 12 24   In the past 12 months, how many times did you visit the emergency room for your asthma without being admitted to the hospital? 0 0 0   In the past 12 months, how many times were you hospitalized overnight because of your asthma? 0 0 0     
Prescription approved per [facility] Refill Protocol.    Lizette Lowe RN  [facility]    
normal...

## 2018-05-16 ENCOUNTER — OFFICE VISIT (OUTPATIENT)
Dept: FAMILY MEDICINE | Facility: OTHER | Age: 48
End: 2018-05-16
Payer: COMMERCIAL

## 2018-05-16 VITALS
SYSTOLIC BLOOD PRESSURE: 116 MMHG | DIASTOLIC BLOOD PRESSURE: 70 MMHG | RESPIRATION RATE: 16 BRPM | OXYGEN SATURATION: 96 % | TEMPERATURE: 98.1 F | HEART RATE: 80 BPM | WEIGHT: 239.7 LBS | BODY MASS INDEX: 38.69 KG/M2

## 2018-05-16 DIAGNOSIS — I10 HYPERTENSION GOAL BP (BLOOD PRESSURE) < 140/90: ICD-10-CM

## 2018-05-16 DIAGNOSIS — F41.1 ANXIETY STATE: Primary | ICD-10-CM

## 2018-05-16 DIAGNOSIS — F33.0 MAJOR DEPRESSIVE DISORDER, RECURRENT EPISODE, MILD (H): ICD-10-CM

## 2018-05-16 PROCEDURE — 99213 OFFICE O/P EST LOW 20 MIN: CPT | Performed by: INTERNAL MEDICINE

## 2018-05-16 RX ORDER — PROPRANOLOL HYDROCHLORIDE 20 MG/1
20 TABLET ORAL 2 TIMES DAILY
Qty: 60 TABLET | Refills: 0 | Status: SHIPPED | OUTPATIENT
Start: 2018-05-16 | End: 2018-06-12

## 2018-05-16 RX ORDER — SERTRALINE HYDROCHLORIDE 100 MG/1
100 TABLET, FILM COATED ORAL DAILY
Qty: 30 TABLET | Refills: 0 | Status: SHIPPED | OUTPATIENT
Start: 2018-05-16 | End: 2018-06-12

## 2018-05-16 ASSESSMENT — ANXIETY QUESTIONNAIRES
1. FEELING NERVOUS, ANXIOUS, OR ON EDGE: NEARLY EVERY DAY
5. BEING SO RESTLESS THAT IT IS HARD TO SIT STILL: NOT AT ALL
2. NOT BEING ABLE TO STOP OR CONTROL WORRYING: NEARLY EVERY DAY
IF YOU CHECKED OFF ANY PROBLEMS ON THIS QUESTIONNAIRE, HOW DIFFICULT HAVE THESE PROBLEMS MADE IT FOR YOU TO DO YOUR WORK, TAKE CARE OF THINGS AT HOME, OR GET ALONG WITH OTHER PEOPLE: EXTREMELY DIFFICULT
7. FEELING AFRAID AS IF SOMETHING AWFUL MIGHT HAPPEN: SEVERAL DAYS
3. WORRYING TOO MUCH ABOUT DIFFERENT THINGS: NEARLY EVERY DAY
GAD7 TOTAL SCORE: 12
6. BECOMING EASILY ANNOYED OR IRRITABLE: SEVERAL DAYS

## 2018-05-16 ASSESSMENT — PATIENT HEALTH QUESTIONNAIRE - PHQ9: 5. POOR APPETITE OR OVEREATING: SEVERAL DAYS

## 2018-05-16 ASSESSMENT — PAIN SCALES - GENERAL: PAINLEVEL: NO PAIN (0)

## 2018-05-16 NOTE — NURSING NOTE
Health Maintenance Due   Topic Date Due     MAMMO Q2 YR  12/18/2017     ASTHMA CONTROL TEST Q6 MOS  06/01/2018     Bushra VILLEGAS LPN  Health Maintenance reviewed at today's visit patient asked to schedule/complete:   Asthma:  Patient agrees to schedule
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
## 2018-05-16 NOTE — LETTER
[facility]  [address] [facility] [address]  Phone: [phone]    May 16, 2018        Kesha David  [address] [address] [place] [id number]          To whom it may concern:    RE: Kesha David    Please excuse Ms. David from work on May 16 and 17.  She is experiencing a medical condition that should resolve itself by May 18 where she can then return to her normal employment.    Please contact me for questions or concerns.      Sincerely,        Michael Regan, DO

## 2018-05-17 ASSESSMENT — PATIENT HEALTH QUESTIONNAIRE - PHQ9: SUM OF ALL RESPONSES TO PHQ QUESTIONS 1-9: 12

## 2018-05-17 ASSESSMENT — ASTHMA QUESTIONNAIRES: ACT_TOTALSCORE: 19

## 2018-05-17 ASSESSMENT — ANXIETY QUESTIONNAIRES: GAD7 TOTAL SCORE: 12

## 2018-06-12 ENCOUNTER — OFFICE VISIT (OUTPATIENT)
Dept: INTERNAL MEDICINE | Facility: CLINIC | Age: 48
End: 2018-06-12
Payer: COMMERCIAL

## 2018-06-12 VITALS
OXYGEN SATURATION: 100 % | TEMPERATURE: 97.1 F | SYSTOLIC BLOOD PRESSURE: 112 MMHG | RESPIRATION RATE: 16 BRPM | HEART RATE: 100 BPM | DIASTOLIC BLOOD PRESSURE: 68 MMHG | BODY MASS INDEX: 37.61 KG/M2 | WEIGHT: 233 LBS

## 2018-06-12 DIAGNOSIS — I10 HYPERTENSION GOAL BP (BLOOD PRESSURE) < 140/90: ICD-10-CM

## 2018-06-12 DIAGNOSIS — F33.0 MAJOR DEPRESSIVE DISORDER, RECURRENT EPISODE, MILD (H): ICD-10-CM

## 2018-06-12 DIAGNOSIS — K21.00 GASTROESOPHAGEAL REFLUX DISEASE WITH ESOPHAGITIS: ICD-10-CM

## 2018-06-12 DIAGNOSIS — Z12.31 ENCOUNTER FOR SCREENING MAMMOGRAM FOR BREAST CANCER: Primary | ICD-10-CM

## 2018-06-12 DIAGNOSIS — F41.1 ANXIETY STATE: ICD-10-CM

## 2018-06-12 PROCEDURE — 99214 OFFICE O/P EST MOD 30 MIN: CPT | Performed by: INTERNAL MEDICINE

## 2018-06-12 RX ORDER — PROPRANOLOL HYDROCHLORIDE 20 MG/1
20 TABLET ORAL 2 TIMES DAILY
Qty: 180 TABLET | Refills: 1 | Status: SHIPPED | OUTPATIENT
Start: 2018-06-12 | End: 2019-01-22

## 2018-06-12 RX ORDER — TRAZODONE HYDROCHLORIDE 50 MG/1
50 TABLET, FILM COATED ORAL AT BEDTIME
Qty: 30 TABLET | Refills: 0 | Status: SHIPPED | OUTPATIENT
Start: 2018-06-12 | End: 2018-07-27

## 2018-06-12 RX ORDER — SERTRALINE HYDROCHLORIDE 100 MG/1
100 TABLET, FILM COATED ORAL DAILY
Qty: 90 TABLET | Refills: 1 | Status: SHIPPED | OUTPATIENT
Start: 2018-06-12 | End: 2019-01-02

## 2018-06-12 ASSESSMENT — PAIN SCALES - GENERAL: PAINLEVEL: NO PAIN (0)
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
## 2018-06-13 ASSESSMENT — ANXIETY QUESTIONNAIRES
7. FEELING AFRAID AS IF SOMETHING AWFUL MIGHT HAPPEN: NEARLY EVERY DAY
6. BECOMING EASILY ANNOYED OR IRRITABLE: SEVERAL DAYS
GAD7 TOTAL SCORE: 14
3. WORRYING TOO MUCH ABOUT DIFFERENT THINGS: MORE THAN HALF THE DAYS
1. FEELING NERVOUS, ANXIOUS, OR ON EDGE: NEARLY EVERY DAY
2. NOT BEING ABLE TO STOP OR CONTROL WORRYING: NEARLY EVERY DAY
5. BEING SO RESTLESS THAT IT IS HARD TO SIT STILL: NOT AT ALL

## 2018-06-13 ASSESSMENT — PATIENT HEALTH QUESTIONNAIRE - PHQ9: 5. POOR APPETITE OR OVEREATING: MORE THAN HALF THE DAYS

## 2018-06-14 ASSESSMENT — PATIENT HEALTH QUESTIONNAIRE - PHQ9: SUM OF ALL RESPONSES TO PHQ QUESTIONS 1-9: 18

## 2018-06-14 ASSESSMENT — ANXIETY QUESTIONNAIRES: GAD7 TOTAL SCORE: 14

## 2018-06-14 ASSESSMENT — ASTHMA QUESTIONNAIRES: ACT_TOTALSCORE: 19

## 2018-06-19 ENCOUNTER — OFFICE VISIT (OUTPATIENT)
Dept: BEHAVIORAL HEALTH | Facility: CLINIC | Age: 48
End: 2018-06-19
Payer: COMMERCIAL

## 2018-06-19 ENCOUNTER — HOSPITAL ENCOUNTER (OUTPATIENT)
Dept: MAMMOGRAPHY | Facility: CLINIC | Age: 48
Discharge: HOME | End: 2018-06-19
Attending: INTERNAL MEDICINE | Admitting: INTERNAL MEDICINE
Payer: COMMERCIAL

## 2018-06-19 DIAGNOSIS — Z12.31 VISIT FOR SCREENING MAMMOGRAM: ICD-10-CM

## 2018-06-19 DIAGNOSIS — F41.1 GAD (GENERALIZED ANXIETY DISORDER): ICD-10-CM

## 2018-06-19 DIAGNOSIS — F10.21 ALCOHOL USE DISORDER, MODERATE, IN EARLY REMISSION (H): ICD-10-CM

## 2018-06-19 DIAGNOSIS — Z12.31 ENCOUNTER FOR SCREENING MAMMOGRAM FOR BREAST CANCER: ICD-10-CM

## 2018-06-19 DIAGNOSIS — F33.1 MAJOR DEPRESSIVE DISORDER, RECURRENT EPISODE, MODERATE (H): Primary | ICD-10-CM

## 2018-06-19 PROCEDURE — 90791 PSYCH DIAGNOSTIC EVALUATION: CPT | Performed by: MARRIAGE & FAMILY THERAPIST

## 2018-06-19 PROCEDURE — 77067 SCR MAMMO BI INCL CAD: CPT
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
## 2018-06-21 PROBLEM — F41.1 GAD (GENERALIZED ANXIETY DISORDER): Status: ACTIVE | Noted: 2018-06-19

## 2018-06-21 PROBLEM — F10.21 ALCOHOL USE DISORDER, MODERATE, IN EARLY REMISSION (H): Status: ACTIVE | Noted: 2018-06-21

## 2018-06-21 PROBLEM — F41.1 GAD (GENERALIZED ANXIETY DISORDER): Status: ACTIVE | Noted: 2018-06-21

## 2018-06-21 PROBLEM — F33.1 MAJOR DEPRESSIVE DISORDER, RECURRENT EPISODE, MODERATE (H): Status: ACTIVE | Noted: 2018-06-21

## 2018-06-21 PROBLEM — F33.1 MAJOR DEPRESSIVE DISORDER, RECURRENT EPISODE, MODERATE (H): Status: ACTIVE | Noted: 2018-06-19

## 2018-06-21 PROBLEM — F10.21 ALCOHOL USE DISORDER, MODERATE, IN EARLY REMISSION (H): Status: ACTIVE | Noted: 2018-06-19

## 2018-07-27 DIAGNOSIS — F33.0 MAJOR DEPRESSIVE DISORDER, RECURRENT EPISODE, MILD (H): ICD-10-CM

## 2018-07-27 NOTE — TELEPHONE ENCOUNTER
"Requested Prescriptions   Pending Prescriptions Disp Refills     traZODone (DESYREL) 50 MG tablet [Pharmacy Med Name: TRAZODONE HCL 50MG TABS] 30 tablet 0    Last Written Prescription Date:  06/12/2018  Last Fill Quantity: 30,  # refills: 0   Last office visit: 6/12/2018 with prescribing provider:  06/12/2018   Future Office Visit:     Sig: TAKE ONE TABLET BY MOUTH EVERY NIGHT AT BEDTIME    Serotonin Modulators Passed    7/27/2018 10:55 AM       Passed - Recent (12 mo) or future (30 days) visit within the authorizing provider's specialty    Patient had office visit in the last 12 months or has a visit in the next 30 days with authorizing provider or within the authorizing provider's specialty.  See \"Patient Info\" tab in inbasket, or \"Choose Columns\" in Meds & Orders section of the refill encounter.           Passed - Patient is age 18 or older       Passed - No active pregnancy on record       Passed - No positive pregnancy test in past 12 months          "

## 2018-07-31 RX ORDER — TRAZODONE HYDROCHLORIDE 50 MG/1
TABLET, FILM COATED ORAL
Qty: 30 TABLET | Refills: 0 | Status: SHIPPED | OUTPATIENT
Start: 2018-07-31 | End: 2018-08-31

## 2018-07-31 NOTE — TELEPHONE ENCOUNTER
Trazodone Routing refill request to provider for review/approval because:  Labs out of range:  PHQ-9    PHQ-9 SCORE 2/15/2018 5/16/2018 6/13/2018   Total Score - - -   Total Score 1 12 18       Anaya Mendoza RN

## 2018-08-21 DIAGNOSIS — J45.30 MILD PERSISTENT ASTHMA WITHOUT COMPLICATION: ICD-10-CM

## 2018-08-21 NOTE — TELEPHONE ENCOUNTER
"Requested Prescriptions   Pending Prescriptions Disp Refills     ADVAIR DISKUS 250-50 MCG/DOSE diskus inhaler [Pharmacy Med Name: ADVAIR DISKUS 250-50MCG/DOSE AEPB]  1    Last Written Prescription Date:  5/11/18  Last Fill Quantity: 1,  # refills: 1   Last office visit: 5/16/2018 with prescribing provider:  5/16/18   Future Office Visit:     Sig: INHALE 1 PUFF INTO THE LUNGS TWO TIMES A DAY    Inhaled Steroids Protocol Failed    8/21/2018  6:03 AM       Failed - Asthma control assessment score within normal limits in last 6 months    Please review ACT score.          Passed - Patient is age 12 or older       Passed - Recent (6 mo) or future (30 days) visit within the authorizing provider's specialty    Patient had office visit in the last 6 months or has a visit in the next 30 days with authorizing provider or within the authorizing provider's specialty.  See \"Patient Info\" tab in inbasket, or \"Choose Columns\" in Meds & Orders section of the refill encounter.              "

## 2018-08-22 NOTE — TELEPHONE ENCOUNTER
ACT Total Scores 12/1/2017 5/16/2018 6/13/2018   ACT TOTAL SCORE (Goal Greater than or Equal to 20) 24 19 19   In the past 12 months, how many times did you visit the emergency room for your asthma without being admitted to the hospital? 0 0 0   In the past 12 months, how many times were you hospitalized overnight because of your asthma? 0 0 0

## 2018-08-22 NOTE — TELEPHONE ENCOUNTER
Routing refill request to provider for review/approval because:  ACT <20    TIM LongoriaN, RN  [facility]

## 2018-08-31 DIAGNOSIS — F33.0 MAJOR DEPRESSIVE DISORDER, RECURRENT EPISODE, MILD (H): ICD-10-CM

## 2018-08-31 RX ORDER — TRAZODONE HYDROCHLORIDE 50 MG/1
TABLET, FILM COATED ORAL
Qty: 30 TABLET | Refills: 3 | Status: SHIPPED | OUTPATIENT
Start: 2018-08-31 | End: 2019-03-01

## 2018-08-31 NOTE — TELEPHONE ENCOUNTER
"Requested Prescriptions   Pending Prescriptions Disp Refills     traZODone (DESYREL) 50 MG tablet [Pharmacy Med Name: TRAZODONE HCL 50MG TABS] 30 tablet 0    Last Written Prescription Date:  7/31/18  Last Fill Quantity: 30,  # refills: 0   Last office visit: 6/12/2018 with prescribing provider:  6-12-18  Future Office Visit:     Sig: TAKE ONE TABLET BY MOUTH EVERY NIGHT AT BEDTIME    Serotonin Modulators Passed    8/31/2018  6:51 AM       Passed - Recent (12 mo) or future (30 days) visit within the authorizing provider's specialty    Patient had office visit in the last 12 months or has a visit in the next 30 days with authorizing provider or within the authorizing provider's specialty.  See \"Patient Info\" tab in inbasket, or \"Choose Columns\" in Meds & Orders section of the refill encounter.           Passed - Patient is age 18 or older       Passed - No active pregnancy on record       Passed - No positive pregnancy test in past 12 months          "

## 2018-08-31 NOTE — TELEPHONE ENCOUNTER
Routing refill request to provider for review/approval because:  PHQ-9 >4    TIM LongoriaN, RN  [facility]

## 2018-09-05 ENCOUNTER — OFFICE VISIT (OUTPATIENT)
Dept: URGENT CARE | Facility: RETAIL CLINIC | Age: 48
End: 2018-09-05
Payer: COMMERCIAL

## 2018-09-05 VITALS — TEMPERATURE: 98.4 F | SYSTOLIC BLOOD PRESSURE: 132 MMHG | HEART RATE: 69 BPM | DIASTOLIC BLOOD PRESSURE: 90 MMHG

## 2018-09-05 DIAGNOSIS — J02.9 ACUTE PHARYNGITIS, UNSPECIFIED ETIOLOGY: Primary | ICD-10-CM

## 2018-09-05 DIAGNOSIS — B34.9 VIRAL SYNDROME: ICD-10-CM

## 2018-09-05 LAB — S PYO AG THROAT QL IA.RAPID: NEGATIVE

## 2018-09-05 PROCEDURE — 87880 STREP A ASSAY W/OPTIC: CPT | Mod: QW | Performed by: FAMILY MEDICINE

## 2018-09-05 PROCEDURE — 99213 OFFICE O/P EST LOW 20 MIN: CPT | Performed by: FAMILY MEDICINE

## 2018-09-05 PROCEDURE — 87081 CULTURE SCREEN ONLY: CPT | Performed by: FAMILY MEDICINE
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
## 2018-09-07 LAB
BACTERIA SPEC CULT: NORMAL
SPECIMEN SOURCE: NORMAL

## 2018-10-08 ENCOUNTER — TELEPHONE (OUTPATIENT)
Dept: PHARMACY | Facility: OTHER | Age: 48
End: 2018-10-08

## 2018-10-15 NOTE — TELEPHONE ENCOUNTER
Pt called me back. She is not interested in MTM at this time, but will reach out in the future if needed.    Becki Khan, PharmD  Medication Therapy Management Pharmacist, [facility]  Pager: [phone]

## 2018-11-16 DIAGNOSIS — J45.30 MILD PERSISTENT ASTHMA WITHOUT COMPLICATION: ICD-10-CM

## 2018-11-16 NOTE — TELEPHONE ENCOUNTER
"Requested Prescriptions   Pending Prescriptions Disp Refills     ADVAIR DISKUS 250-50 MCG/DOSE diskus inhaler [Pharmacy Med Name: ADVAIR DISKUS 250-50MCG/DOSE AEPB]  1    Last Written Prescription Date:  8/22/18  Last Fill Quantity: 1 inhaler,  # refills: 1   Last office visit: 5/16/2018 with prescribing provider:  6/12/18   Future Office Visit:     Sig: INHALE 1 PUFF INTO THE LUNGS TWO TIMES A DAY    Inhaled Steroids Protocol Failed    11/16/2018  4:49 AM       Failed - Asthma control assessment score within normal limits in last 6 months    Please review ACT score.   ACT Total Scores 12/1/2017 5/16/2018 6/13/2018   ACT TOTAL SCORE (Goal Greater than or Equal to 20) 24 19 19   In the past 12 months, how many times did you visit the emergency room for your asthma without being admitted to the hospital? 0 0 0   In the past 12 months, how many times were you hospitalized overnight because of your asthma? 0 0 0          Passed - Patient is age 12 or older       Passed - Recent (6 mo) or future (30 days) visit within the authorizing provider's specialty    Patient had office visit in the last 6 months or has a visit in the next 30 days with authorizing provider or within the authorizing provider's specialty.  See \"Patient Info\" tab in inbasket, or \"Choose Columns\" in Meds & Orders section of the refill encounter.            "

## 2018-12-27 ENCOUNTER — TELEPHONE (OUTPATIENT)
Dept: INTERNAL MEDICINE | Facility: CLINIC | Age: 48
End: 2018-12-27

## 2018-12-27 NOTE — TELEPHONE ENCOUNTER
Patient is due for a PHQ-9.  Index start date:9/16/2018  Index end date:1/16/2019    Please call patient. Pt is due for an OV for a med check. Call pt to schedule and update a PHQ-9. Gi Connolly CMA ([facility])

## 2019-01-02 DIAGNOSIS — F41.1 ANXIETY STATE: ICD-10-CM

## 2019-01-03 RX ORDER — SERTRALINE HYDROCHLORIDE 100 MG/1
TABLET, FILM COATED ORAL
Qty: 90 TABLET | Refills: 1 | Status: SHIPPED | OUTPATIENT
Start: 2019-01-03 | End: 2019-10-14

## 2019-01-03 NOTE — TELEPHONE ENCOUNTER
"Requested Prescriptions   Pending Prescriptions Disp Refills     sertraline (ZOLOFT) 100 MG tablet [Pharmacy Med Name: SERTRALINE HCL 100MG TABS] 90 tablet 1    Last Written Prescription Date:  6/12/18  Last Fill Quantity: 90,  # refills: 1   Last office visit: 6/12/2018 with prescribing provider:  6/12/18   Future Office Visit:     Sig: TAKE ONE TABLET BY MOUTH EVERY DAY    SSRIs Protocol Passed - 1/2/2019  5:05 AM       Passed - Recent (12 mo) or future (30 days) visit within the authorizing provider's specialty    Patient had office visit in the last 12 months or has a visit in the next 30 days with authorizing provider or within the authorizing provider's specialty.  See \"Patient Info\" tab in inbasket, or \"Choose Columns\" in Meds & Orders section of the refill encounter.             Passed - Patient is age 18 or older       Passed - No active pregnancy on record       Passed - No positive pregnancy test in last 12 months        "

## 2019-01-03 NOTE — TELEPHONE ENCOUNTER
Prescription approved per [facility] Refill Protocol.    TIM LongoriaN, RN  [facility]

## 2019-01-16 NOTE — TELEPHONE ENCOUNTER
PHQ-9 missed. I will close the encounter until further outreach. Gi Connolly CMA ([facility])

## 2019-01-22 DIAGNOSIS — F41.1 ANXIETY STATE: ICD-10-CM

## 2019-01-23 RX ORDER — PROPRANOLOL HYDROCHLORIDE 20 MG/1
TABLET ORAL
Qty: 180 TABLET | Refills: 1 | Status: SHIPPED | OUTPATIENT
Start: 2019-01-23 | End: 2019-08-22

## 2019-01-23 NOTE — TELEPHONE ENCOUNTER
"Requested Prescriptions   Pending Prescriptions Disp Refills     propranolol (INDERAL) 20 MG tablet [Pharmacy Med Name: PROPRANOLOL HCL 20MG TABS] 180 tablet 1     Sig: TAKE ONE TABLET BY MOUTH TWICE A DAY    Beta-Blockers Protocol Failed - 1/22/2019  4:51 AM       Failed - Blood pressure under 140/90 in past 12 months    BP Readings from Last 3 Encounters:   09/05/18 132/90   06/12/18 112/68   05/16/18 116/70          Passed - Patient is age 6 or older       Passed - Recent (12 mo) or future (30 days) visit within the authorizing provider's specialty    Patient had office visit in the last 12 months or has a visit in the next 30 days with authorizing provider or within the authorizing provider's specialty.  See \"Patient Info\" tab in inbasket, or \"Choose Columns\" in Meds & Orders section of the refill encounter.           Passed - Medication is active on med list        Last ov 05/16/2018  Routing refill request to provider for review/approval because:  BP not at goal please advise on float or OV and refills        "

## 2019-01-28 DIAGNOSIS — K21.00 GASTROESOPHAGEAL REFLUX DISEASE WITH ESOPHAGITIS: ICD-10-CM

## 2019-01-29 NOTE — TELEPHONE ENCOUNTER
"Omeprazole  Last Written Prescription Date:  06/12/2018  Last Fill Quantity: 90,  # refills: 1   Last office visit: 05/16/2018 with prescribing provider:  Shereen   Future Office Visit:  None  Prescription approved per [facility] Refill Protocol.    Requested Prescriptions   Pending Prescriptions Disp Refills     omeprazole (PRILOSEC) 20 MG DR capsule [Pharmacy Med Name: OMEPRAZOLE 20MG CPDR] 90 capsule 1     Sig: TAKE ONE CAPSULE BY MOUTH EVERY DAY    PPI Protocol Passed - 1/28/2019  6:25 PM       Passed - Not on Clopidogrel (unless Pantoprazole ordered)       Passed - No diagnosis of osteoporosis on record       Passed - Recent (12 mo) or future (30 days) visit within the authorizing provider's specialty    Patient had office visit in the last 12 months or has a visit in the next 30 days with authorizing provider or within the authorizing provider's specialty.  See \"Patient Info\" tab in inbasket, or \"Choose Columns\" in Meds & Orders section of the refill encounter.         Passed - Medication is active on med list       Passed - Patient is age 18 or older       Passed - No active pregnacy on record       Passed - No positive pregnancy test in past 12 months      Tarsha Saldaña RN   "

## 2019-02-01 ENCOUNTER — OFFICE VISIT (OUTPATIENT)
Dept: FAMILY MEDICINE | Facility: OTHER | Age: 49
End: 2019-02-01
Payer: COMMERCIAL

## 2019-02-01 VITALS
TEMPERATURE: 97 F | OXYGEN SATURATION: 98 % | DIASTOLIC BLOOD PRESSURE: 80 MMHG | RESPIRATION RATE: 16 BRPM | SYSTOLIC BLOOD PRESSURE: 126 MMHG | BODY MASS INDEX: 37.9 KG/M2 | HEART RATE: 72 BPM | WEIGHT: 235.8 LBS | HEIGHT: 66 IN

## 2019-02-01 DIAGNOSIS — K21.00 GASTROESOPHAGEAL REFLUX DISEASE WITH ESOPHAGITIS: ICD-10-CM

## 2019-02-01 DIAGNOSIS — Z72.0 TOBACCO ABUSE: ICD-10-CM

## 2019-02-01 DIAGNOSIS — R06.02 WAKING AT NIGHT SHORT OF BREATH: ICD-10-CM

## 2019-02-01 DIAGNOSIS — M77.02 MEDIAL EPICONDYLITIS OF ELBOW, LEFT: ICD-10-CM

## 2019-02-01 DIAGNOSIS — B37.0 THRUSH: Primary | ICD-10-CM

## 2019-02-01 PROCEDURE — 99214 OFFICE O/P EST MOD 30 MIN: CPT | Performed by: PHYSICIAN ASSISTANT

## 2019-02-01 RX ORDER — FLUCONAZOLE 100 MG/1
TABLET ORAL
Qty: 11 TABLET | Refills: 0 | Status: SHIPPED | OUTPATIENT
Start: 2019-02-01 | End: 2019-04-23

## 2019-02-01 ASSESSMENT — MIFFLIN-ST. JEOR: SCORE: 1716.33

## 2019-02-01 ASSESSMENT — PAIN SCALES - GENERAL: PAINLEVEL: NO PAIN (0)

## 2019-02-01 ASSESSMENT — PATIENT HEALTH QUESTIONNAIRE - PHQ9: SUM OF ALL RESPONSES TO PHQ QUESTIONS 1-9: 9

## 2019-02-01 NOTE — PROGRESS NOTES
SUBJECTIVE:   Kesha David is a 48 year old female who presents to clinic today for the following health issues:      Concern - white tongue  Onset: 1 month    Description:   Tongue white    Intensity: moderate    Progression of Symptoms:  same and constant    Accompanying Signs & Symptoms:  Allergies, itching    Previous history of similar problem:   YES    Precipitating factors:   Worsened by: sugar    Alleviating factors:  Improved by: nothing    Therapies Tried and outcome: gargle with baking soda/peroxide, gargle with salt water; no relief    Patient is a 48 year old female who presents with several health concerns including exudates in mouth, waking gasping for breath, pain in the left medial elbow, poorly controlled reflux and smoking. Patient says that the pain in her mouth ha been worsening over this past month. She does use an advair inhaler daily and says that she tries to remember to rinse following use. Denies fever, recent illness or use of antibiotics, trouble swallowing or change in voice. Patient reports that for some years now she has been waking gasping for breath at night. She initially fel that these symptoms were due to her reflux, but says that they have persisted despite use of PPI and raising head of the bed. Patient also reports feeling fatigued during the day and waking feeling poorly refreshed. Denies microsleeps or frequent daytime naps. She has a peer who has encouraged her to have a sleep study done for some time. I will place referral for her to consult with sleep specialist. Patient says that she has been experiencing pain and discomfort in the medial left elbow for several weeks. 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 Denies repetitive motions such as sports or exercise regiments. Does experience some numbness/tingling when left UE is in specific positions and these symptoms improve with changing position or stretching the arm. Discussed use of  "ibuprofen/tylenol, ice and rest. Consider PT. Patient is concerned about taste of acid in her mouth at night and wonders if her reflux is worsening. She is status post gastric bypass 2005. We discussed eating smaller meals and not laying down for 60minutes or more following eating. Encouraged patient to monitor for trigger foods/drinks and to quit smoking. Patient expressed understanding that smoking cessation would benefit several of current medical conditions, but is not interested in starting any treatment for quitting at this time.     Problem list and histories reviewed & adjusted, as indicated.  Additional history: as documented    Reviewed and updated as needed this visit by clinical staff  Tobacco  Allergies  Meds  Med Hx  Surg Hx  Fam Hx  Soc Hx      Reviewed and updated as needed this visit by Provider         ROS:  Constitutional, HEENT, cardiovascular, pulmonary, gi and gu systems are negative, except as otherwise noted.    OBJECTIVE:     /80 (BP Location: Right arm, Patient Position: Chair, Cuff Size: Adult Large)   Pulse 72   Temp 97  F (36.1  C) (Oral)   Resp 16   Ht 1.676 m (5' 6\")   Wt 107 kg (235 lb 12.8 oz)   SpO2 98%   BMI 38.06 kg/m    Body mass index is 38.06 kg/m .  GENERAL: healthy, alert and no distress  HENT: ear canals and TM's normal, nose and mouth with exudates on tongue which were able to be scraped off  NECK: no adenopathy, no asymmetry, masses, or scars and thyroid normal to palpation  RESP: lungs clear to auscultation - no rales, rhonchi or wheezes  CV: regular rate and rhythm, normal S1 S2, no S3 or S4, no murmur, click or rub, no peripheral edema and peripheral pulses strong  ABDOMEN: soft, nontender, no hepatosplenomegaly, no masses and bowel sounds normal  MS: no gross musculoskeletal defects noted, no edema, mild tenderness to palpation over the left flexor muscles, pain with resisted flexion of left wrist  NEURO: Normal strength and tone, mentation intact " and speech normal  PSYCH: mentation appears normal, affect normal/bright    Diagnostic Test Results:  none     ASSESSMENT/PLAN:     1. Thrush  Patient will start oral diflucan for next 10 days. Encouraged good oral hygiene and stressed importance of brushing tongue and gums along with teeth. Continue to rinse mouth following use of advair.   - fluconazole (DIFLUCAN) 100 MG tablet; Take 2 tabs (200mg) on day 1, then 1 tab (100mg) until gone  Dispense: 11 tablet; Refill: 0    2. Waking at night short of breath  Patient will meet with sleep specialist to determine whether sleep study is recommended.   - SLEEP EVALUATION & MANAGEMENT REFERRAL - [facility] [place] [phone] (Age 13 if over 100 lbs); Future    3. Medial epicondylitis of elbow, left  Discussed 1st line treatment for epicondylitis is RICE therapy and encouraged the patient to consider PT if not improving. Patient will call following completion of diflucan for prednisone burst if pain persisting.     4. Gastroesophageal reflux disease with esophagitis  Reviewed lifestyle modifications with patient and encouraged smaller meals, not laying down for 60 minutes following eating and monitoring/avoiding trigger foods and drinks.     5. Tobacco abuse  Patient would benefit greatly from smoking cessation. She acknowledges this, but is not interested in starting any treatment at this time.       Follow up with clinic for annual checkup or sooner if conditions change, worsen or fail to improve as expected.      Darrell Joseph PA-C  [facility]

## 2019-02-01 NOTE — PATIENT INSTRUCTIONS
Patient Education     Obstructive Sleep Apnea  Obstructive sleep apnea is a condition that causes your air passages to become narrowed or blocked during sleep. As a result, breathing stops for short periods. Your body wakes up enough for breathing to begin again, though you don't remember it. The cycle of stopped breathing and brief awakenings can repeat dozens of times a night. This prevents the body from getting to the deeper stages of sleep that are needed for good rest and may cause your body's oxygen level to fall.  Signs of sleep apnea include loud snoring, noisy breathing, and gasping sounds during sleep. Daytime symptoms include waking up tired after a full night's sleep, waking up with headaches, feeling very sleepy or falling asleep during the day, and having problems with memory or concentration.  Risk factors for sleep apnea include:    Being overweight    Being a man, or a woman in menopause    Smoking    Using alcohol or sedating medicines    Having enlarged structures in the nose or throat  Home care  Lifestyle changes that can help treat snoring and sleep apnea include the following:    If you are overweight, lose weight. Talk to your healthcare provider about a weight-loss plan for you.    Avoid alcohol for 3 to 4 hours before bedtime. Avoid sedating medications. Ask your healthcare provider about the medicines you take.    If you smoke, talk to your healthcare provider about ways to quit.    Sleep on your side. This can help prevent gravity from pulling relaxed throat tissues into your breathing passages.    If you have allergies or sinus problems that block your nose, ask your healthcare provider for help.  Follow-up care  Follow up with your healthcare provider, or as advised. A diagnosis of sleep apnea is made with a sleep study. Your healthcare provider can tell you more about this test.  When to seek medical advice  Sleep apnea can make you more likely to have certain health problems. These  include high blood pressure, heart attack, stroke, and sexual dysfunction. If you have sleep apnea, talk to your healthcare provider about the best treatments for you.  Date Last Reviewed: 4/1/2017 2000-2018 [employer]. [address] [place] [address], [place] [address]. All rights reserved. This information is not intended as a substitute for professional medical care. Always follow your healthcare professional's instructions.

## 2019-02-01 NOTE — NURSING NOTE
Health Maintenance Due   Topic Date Due     INFLUENZA VACCINE (1) 09/01/2018     ASTHMA ACTION PLAN Q1 YR  12/01/2018     ASTHMA CONTROL TEST Q6 MOS  12/12/2018     PHQ-9 Q6 MONTHS  12/12/2018     Bushra VILLEGAS LPN

## 2019-02-02 ASSESSMENT — ASTHMA QUESTIONNAIRES: ACT_TOTALSCORE: 21

## 2019-02-04 DIAGNOSIS — J45.30 MILD PERSISTENT ASTHMA WITHOUT COMPLICATION: ICD-10-CM

## 2019-02-05 NOTE — TELEPHONE ENCOUNTER
"Last Written Prescription Date:  8/22/18  Last Fill Quantity: 1 inhaler,  # refills: 1   Last office visit: 6/12/18with prescribing provider:  Michael Regan   Future Office Visit:      Requested Prescriptions   Pending Prescriptions Disp Refills     ADVAIR DISKUS 250-50 MCG/DOSE inhaler [Pharmacy Med Name: ADVAIR DISKUS 250-50MCG/DOSE AEPB]  1     Sig: INHALE ONE PUFF BY MOUTH TWICE A DAY    Inhaled Steroids Protocol Passed - 2/4/2019  9:01 AM       Passed - Patient is age 12 or older       Passed - Asthma control assessment score within normal limits in last 6 months    Please review ACT score.          Passed - Medication is active on med list       Passed - Recent (6 mo) or future (30 days) visit within the authorizing provider's specialty    Patient had office visit in the last 6 months or has a visit in the next 30 days with authorizing provider or within the authorizing provider's specialty.  See \"Patient Info\" tab in inbasket, or \"Choose Columns\" in Meds & Orders section of the refill encounter.            ACT Total Scores 5/16/2018 6/13/2018 2/1/2019   ACT TOTAL SCORE (Goal Greater than or Equal to 20) 19 19 21   In the past 12 months, how many times did you visit the emergency room for your asthma without being admitted to the hospital? 0 0 0   In the past 12 months, how many times were you hospitalized overnight because of your asthma? 0 0 0     Routing refill request to provider for review/approval because:  Drug interaction warning: Propranolol and Advair    Anaya Mendoza RN on 2/5/2019 at 2:47 PM          Anaya Mendoza RN on 2/5/2019 at 2:45 PM    "

## 2019-02-08 ENCOUNTER — MYC MEDICAL ADVICE (OUTPATIENT)
Dept: FAMILY MEDICINE | Facility: OTHER | Age: 49
End: 2019-02-08

## 2019-02-08 DIAGNOSIS — K14.3 BROWN HAIRY TONGUE: Primary | ICD-10-CM

## 2019-03-01 DIAGNOSIS — F33.0 MAJOR DEPRESSIVE DISORDER, RECURRENT EPISODE, MILD (H): ICD-10-CM

## 2019-03-04 RX ORDER — TRAZODONE HYDROCHLORIDE 50 MG/1
TABLET, FILM COATED ORAL
Qty: 30 TABLET | Refills: 0 | Status: SHIPPED | OUTPATIENT
Start: 2019-03-04 | End: 2020-11-13

## 2019-03-04 NOTE — TELEPHONE ENCOUNTER
"Requested Prescriptions   Pending Prescriptions Disp Refills     traZODone (DESYREL) 50 MG tablet [Pharmacy Med Name: TRAZODONE HCL 50MG TABS] 30 tablet 3    Last Written Prescription Date:  8/31/18  Last Fill Quantity: 30,  # refills: 3   Last office visit: 6/12/2018 with prescribing provider:     Future Office Visit:     Sig: TAKE ONE TABLET BY MOUTH EVERY NIGHT AT BEDTIME    Serotonin Modulators Passed - 3/1/2019  5:01 AM       Passed - Recent (12 mo) or future (30 days) visit within the authorizing provider's specialty    Patient had office visit in the last 12 months or has a visit in the next 30 days with authorizing provider or within the authorizing provider's specialty.  See \"Patient Info\" tab in inbasket, or \"Choose Columns\" in Meds & Orders section of the refill encounter.    PHQ-9 score:    PHQ-9 SCORE 2/1/2019   PHQ-9 Total Score -   PHQ-9 Total Score 9          Passed - Medication is active on med list       Passed - Patient is age 18 or older       Passed - No active pregnancy on record       Passed - No positive pregnancy test in past 12 months        Routing refill request to provider for review/approval because:  Labs out of range:  PHQ9  This medication associated with major depressive disorder    T'd up 1 month for provider review.    Gayathri Subramanian RN          "

## 2019-04-23 ENCOUNTER — OFFICE VISIT (OUTPATIENT)
Dept: SLEEP MEDICINE | Facility: CLINIC | Age: 49
End: 2019-04-23
Attending: PHYSICIAN ASSISTANT
Payer: COMMERCIAL

## 2019-04-23 VITALS
BODY MASS INDEX: 37.77 KG/M2 | WEIGHT: 235 LBS | OXYGEN SATURATION: 96 % | SYSTOLIC BLOOD PRESSURE: 128 MMHG | HEIGHT: 66 IN | HEART RATE: 82 BPM | DIASTOLIC BLOOD PRESSURE: 85 MMHG

## 2019-04-23 DIAGNOSIS — R06.83 SNORING: Primary | ICD-10-CM

## 2019-04-23 DIAGNOSIS — G47.19 EXCESSIVE DAYTIME SLEEPINESS: ICD-10-CM

## 2019-04-23 DIAGNOSIS — R06.81 APNEA: ICD-10-CM

## 2019-04-23 DIAGNOSIS — F51.04 PSYCHOPHYSIOLOGICAL INSOMNIA: ICD-10-CM

## 2019-04-23 PROCEDURE — 99203 OFFICE O/P NEW LOW 30 MIN: CPT | Performed by: OTOLARYNGOLOGY

## 2019-04-23 RX ORDER — ZOLPIDEM TARTRATE 5 MG/1
TABLET ORAL
Qty: 1 TABLET | Refills: 0 | Status: SHIPPED | OUTPATIENT
Start: 2019-04-23 | End: 2019-09-06

## 2019-04-23 ASSESSMENT — MIFFLIN-ST. JEOR: SCORE: 1707.7
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
## 2019-04-29 ENCOUNTER — TELEPHONE (OUTPATIENT)
Dept: FAMILY MEDICINE | Facility: OTHER | Age: 49
End: 2019-04-29

## 2019-04-29 DIAGNOSIS — J45.30 MILD PERSISTENT ASTHMA WITHOUT COMPLICATION: ICD-10-CM

## 2019-04-29 NOTE — TELEPHONE ENCOUNTER
[facility] Prior Authorization Team Request    Medication: Omeprazole 20mg  Dosing: One capsule daily  NDC (required for Medicaid members): [address]-10    Insurance   BIN: [id number]  PCN: [id number]  Grp: [id number]  ID: [id number]    CoverMyMeds Key (if applicable):     Additional documentation:     Filling Pharmacy: [facility]  Phone Number: [phone]  Contact: THELMA ROBERTS PA ([id number]) please send all responses to this pool.  Pharmacy NPI (required for Medicaid members):[id number]    Lindsey Teixeira-Technician  [facility]  [phone]

## 2019-04-30 NOTE — TELEPHONE ENCOUNTER
"Requested Prescriptions   Pending Prescriptions Disp Refills     ADVAIR DISKUS 250-50 MCG/DOSE inhaler [Pharmacy Med Name: ADVAIR DISKUS 250-50MCG/DOSE AEPB]  1     Sig: INHALE ONE PUFF BY MOUTH TWICE A DAY   Last Written Prescription Date:  2/5/19  Last Fill Quantity: 1 inhaler,  # refills: 1   Last office visit: 2/1/2019 with prescribing provider:     Future Office Visit:        Inhaled Steroids Protocol Passed - 4/29/2019  4:59 AM        Passed - Patient is age 12 or older        Passed - Asthma control assessment score within normal limits in last 6 months     Please review ACT score.     ACT Total Scores 5/16/2018 6/13/2018 2/1/2019   ACT TOTAL SCORE (Goal Greater than or Equal to 20) 19 19 21   In the past 12 months, how many times did you visit the emergency room for your asthma without being admitted to the hospital? 0 0 0   In the past 12 months, how many times were you hospitalized overnight because of your asthma? 0 0 0             Passed - Medication is active on med list        Passed - Recent (6 mo) or future (30 days) visit within the authorizing provider's specialty     Patient had office visit in the last 6 months or has a visit in the next 30 days with authorizing provider or within the authorizing provider's specialty.  See \"Patient Info\" tab in inbasket, or \"Choose Columns\" in Meds & Orders section of the refill encounter.          Prescription approved per [facility] Refill Protocol.  Gayathri Subramanian RN      "

## 2019-05-02 NOTE — TELEPHONE ENCOUNTER
Prior Authorization Approval    Authorization Effective Date: 4/2/2019  Authorization Expiration Date: 5/1/2022  Medication: Omeprazole 20mg - APPROVED  Approved Dose/Quantity:   Reference #:     Insurance Company: MEDICA - Phone [phone] Fax [phone]  Expected CoPay:       CoPay Card Available:      Foundation Assistance Needed:    Which Pharmacy is filling the prescription (Not needed for infusion/clinic administered): [facility], [place] - [address] [place] [address]  Pharmacy Notified: Yes  Patient Notified: Comment:  **Instructed pharmacy to notify patient when script is ready to /ship.**

## 2019-05-02 NOTE — TELEPHONE ENCOUNTER
PA Initiation    Medication: Omeprazole 20mg -   Insurance Company: MEDICA - Phone [phone] Fax [phone]  Pharmacy Filling the Rx: [facility] [place] - [address]   Filling Pharmacy Phone: [phone]  Filling Pharmacy Fax: [phone]  Start Date: 5/2/2019

## 2019-05-07 ENCOUNTER — THERAPY VISIT (OUTPATIENT)
Dept: SLEEP MEDICINE | Facility: CLINIC | Age: 49
End: 2019-05-07
Payer: COMMERCIAL

## 2019-05-07 DIAGNOSIS — R06.81 APNEA: ICD-10-CM

## 2019-05-07 DIAGNOSIS — R06.83 SNORING: ICD-10-CM

## 2019-05-07 DIAGNOSIS — G47.19 EXCESSIVE DAYTIME SLEEPINESS: ICD-10-CM

## 2019-05-07 PROCEDURE — 95810 POLYSOM 6/> YRS 4/> PARAM: CPT | Performed by: OTOLARYNGOLOGY

## 2019-05-15 LAB — SLPCOMP: NORMAL

## 2019-06-06 ENCOUNTER — OFFICE VISIT (OUTPATIENT)
Dept: SLEEP MEDICINE | Facility: CLINIC | Age: 49
End: 2019-06-06
Payer: COMMERCIAL

## 2019-06-06 VITALS
OXYGEN SATURATION: 95 % | DIASTOLIC BLOOD PRESSURE: 95 MMHG | BODY MASS INDEX: 38.09 KG/M2 | HEART RATE: 74 BPM | HEIGHT: 66 IN | SYSTOLIC BLOOD PRESSURE: 177 MMHG | WEIGHT: 237 LBS

## 2019-06-06 DIAGNOSIS — G47.33 OSA (OBSTRUCTIVE SLEEP APNEA): Primary | ICD-10-CM

## 2019-06-06 PROCEDURE — 99213 OFFICE O/P EST LOW 20 MIN: CPT | Performed by: OTOLARYNGOLOGY

## 2019-06-06 ASSESSMENT — MIFFLIN-ST. JEOR: SCORE: 1716.77
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
## 2019-06-06 NOTE — NURSING NOTE
Weight management plan: Patient was referred to their PCP to discuss a diet and exercise plan.     Kesha to follow up with Primary Care provider regarding elevated blood pressure.

## 2019-06-14 ENCOUNTER — HOSPITAL ENCOUNTER (EMERGENCY)
Facility: CLINIC | Age: 49
Discharge: HOME | End: 2019-06-14
Attending: FAMILY MEDICINE | Admitting: FAMILY MEDICINE
Payer: COMMERCIAL

## 2019-06-14 ENCOUNTER — TELEPHONE (OUTPATIENT)
Dept: FAMILY MEDICINE | Facility: OTHER | Age: 49
End: 2019-06-14

## 2019-06-14 ENCOUNTER — APPOINTMENT (OUTPATIENT)
Dept: GENERAL RADIOLOGY | Facility: CLINIC | Age: 49
End: 2019-06-14
Attending: FAMILY MEDICINE
Payer: COMMERCIAL

## 2019-06-14 VITALS
OXYGEN SATURATION: 97 % | BODY MASS INDEX: 40.35 KG/M2 | RESPIRATION RATE: 18 BRPM | WEIGHT: 250 LBS | TEMPERATURE: 98.4 F | HEART RATE: 99 BPM | DIASTOLIC BLOOD PRESSURE: 96 MMHG | SYSTOLIC BLOOD PRESSURE: 133 MMHG

## 2019-06-14 DIAGNOSIS — Z91.09 ENVIRONMENTAL ALLERGIES: ICD-10-CM

## 2019-06-14 DIAGNOSIS — J45.41 MODERATE PERSISTENT ASTHMA WITH EXACERBATION: ICD-10-CM

## 2019-06-14 DIAGNOSIS — Z72.0 TOBACCO ABUSE: ICD-10-CM

## 2019-06-14 PROCEDURE — 94640 AIRWAY INHALATION TREATMENT: CPT

## 2019-06-14 PROCEDURE — 99285 EMERGENCY DEPT VISIT HI MDM: CPT | Mod: 25 | Performed by: FAMILY MEDICINE

## 2019-06-14 PROCEDURE — 25000125 ZZHC RX 250: Performed by: FAMILY MEDICINE

## 2019-06-14 PROCEDURE — 25000131 ZZH RX MED GY IP 250 OP 636 PS 637: Performed by: FAMILY MEDICINE

## 2019-06-14 PROCEDURE — 71046 X-RAY EXAM CHEST 2 VIEWS: CPT | Mod: TC

## 2019-06-14 PROCEDURE — 99284 EMERGENCY DEPT VISIT MOD MDM: CPT | Mod: Z6 | Performed by: FAMILY MEDICINE

## 2019-06-14 RX ORDER — ALBUTEROL SULFATE 0.83 MG/ML
2.5 SOLUTION RESPIRATORY (INHALATION) ONCE
Status: COMPLETED | OUTPATIENT
Start: 2019-06-14 | End: 2019-06-14

## 2019-06-14 RX ORDER — PREDNISONE 20 MG/1
20 TABLET ORAL 2 TIMES DAILY
Qty: 10 TABLET | Refills: 0 | Status: SHIPPED | OUTPATIENT
Start: 2019-06-14 | End: 2019-09-06

## 2019-06-14 RX ORDER — IPRATROPIUM BROMIDE AND ALBUTEROL SULFATE 2.5; .5 MG/3ML; MG/3ML
3 SOLUTION RESPIRATORY (INHALATION) ONCE
Status: COMPLETED | OUTPATIENT
Start: 2019-06-14 | End: 2019-06-14

## 2019-06-14 RX ORDER — ALBUTEROL SULFATE 0.83 MG/ML
2.5 SOLUTION RESPIRATORY (INHALATION) EVERY 4 HOURS PRN
Qty: 1 BOX | Refills: 0 | Status: SHIPPED | OUTPATIENT
Start: 2019-06-14 | End: 2019-09-06

## 2019-06-14 RX ORDER — PREDNISONE 20 MG/1
60 TABLET ORAL ONCE
Status: COMPLETED | OUTPATIENT
Start: 2019-06-14 | End: 2019-06-14

## 2019-06-14 RX ORDER — IPRATROPIUM BROMIDE AND ALBUTEROL SULFATE 2.5; .5 MG/3ML; MG/3ML
3 SOLUTION RESPIRATORY (INHALATION)
Status: ACTIVE | OUTPATIENT
Start: 2019-06-14 | End: 2019-06-14

## 2019-06-14 RX ADMIN — IPRATROPIUM BROMIDE AND ALBUTEROL SULFATE 3 ML: .5; 3 SOLUTION RESPIRATORY (INHALATION) at 16:01

## 2019-06-14 RX ADMIN — IPRATROPIUM BROMIDE AND ALBUTEROL SULFATE 3 ML: .5; 3 SOLUTION RESPIRATORY (INHALATION) at 16:04

## 2019-06-14 RX ADMIN — PREDNISONE 60 MG: 20 TABLET ORAL at 16:15

## 2019-06-14 RX ADMIN — ALBUTEROL SULFATE 2.5 MG: 2.5 SOLUTION RESPIRATORY (INHALATION) at 17:06

## 2019-06-14 ASSESSMENT — ENCOUNTER SYMPTOMS
FEVER: 0
DIZZINESS: 0
VOICE CHANGE: 0
NERVOUS/ANXIOUS: 0
CONFUSION: 0
RHINORRHEA: 1
LIGHT-HEADEDNESS: 0
BACK PAIN: 0
DIFFICULTY URINATING: 0
HEADACHES: 0
NAUSEA: 0
RECTAL PAIN: 0
EYE REDNESS: 0
SHORTNESS OF BREATH: 1
WEAKNESS: 0
TROUBLE SWALLOWING: 0
CHILLS: 0
DIARRHEA: 0
POLYDIPSIA: 0
WHEEZING: 1

## 2019-06-14 NOTE — TELEPHONE ENCOUNTER
"Reason for Call:  Same Day Appointment, Requested Provider:  Any [facility] provider    PCP: Michael Regan    Reason for visit: asthma flare    Duration of symptoms: 3 days    Have you been treated for this in the past? No    Additional comments: reports having trouble \"catching her breath\" is wheezing, has phlegmy cough.  Cai been using her albuterol inhaler with no relief.  Can someone in [place] see her after 3:00?  Or order medication for her.  She is on her way home from vacation and is currently in [facility].   Can we leave a detailed message on this number? YES    Phone number patient can be reached at: Cell number on file:    Telephone Information:   Mobile [phone]       Best Time: as soon as possible.     Call taken on 6/14/2019 at 11:13 AM by Jesusita Julian  Has been using her Albuterol inhaler, has pleghmy cough, wheezing    "
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
## 2019-06-14 NOTE — ED TRIAGE NOTES
Pt presents with wheezing and shortness of breath for 3 weeks, much worse today.  She has a hx of asthma, ran out of her Advair a week ago.

## 2019-06-14 NOTE — ED AVS SNAPSHOT
[facility] Emergency Department  [address] [place] [address]  Phone:  [phone]  Fax:  [phone]                                    Kesha David   MRN: [id number]    Department:  [facility] Emergency Department   Date of Visit:  6/14/2019           After Visit Summary Signature Page    I have received my discharge instructions, and my questions have been answered. I have discussed any challenges I see with this plan with the nurse or doctor.    ..........................................................................................................................................  Patient/Patient Representative Signature      ..........................................................................................................................................  Patient Representative Print Name and Relationship to Patient    ..................................................               ................................................  Date                                   Time    ..........................................................................................................................................  Reviewed by Signature/Title    ...................................................              ..............................................  Date                                               Time          22EPIC Rev 08/18

## 2019-07-08 ENCOUNTER — TELEPHONE (OUTPATIENT)
Dept: FAMILY MEDICINE | Facility: OTHER | Age: 49
End: 2019-07-08

## 2019-07-08 NOTE — TELEPHONE ENCOUNTER
Patient is due for a PHQ-9.  Index start date:3/17/2019  Index end date:7/15/2019    Please call patient. Pt is active on [employer]. I have sent a PHQ-9 via [employer] and will postpone the encounter. Gi Connolly CMA ([facility])

## 2019-07-15 ASSESSMENT — PATIENT HEALTH QUESTIONNAIRE - PHQ9: SUM OF ALL RESPONSES TO PHQ QUESTIONS 1-9: 0

## 2019-07-15 NOTE — TELEPHONE ENCOUNTER
I have contacted the pt and updated a PHQ-9.    PHQ-9 SCORE 7/15/2019   PHQ-9 Total Score -   PHQ-9 Total Score 0     Gi Connolly CMA ([facility])

## 2019-07-18 ENCOUNTER — TRANSFERRED RECORDS (OUTPATIENT)
Dept: HEALTH INFORMATION MANAGEMENT | Facility: CLINIC | Age: 49
End: 2019-07-18

## 2019-07-18 LAB
ALT SERPL-CCNC: 8 IU/L (ref 0–32)
AST SERPL-CCNC: 12 IU/L (ref 0–40)
CHOLEST SERPL-MCNC: 242 MG/DL (ref 100–199)
CREAT SERPL-MCNC: 0.74 MG/DL (ref 0.57–1)
GFR SERPL CREATININE-BSD FRML MDRD: 95 ML/MIN/1.73M2
GLUCOSE SERPL-MCNC: 87 MG/DL (ref 65–99)
HBA1C MFR BLD: 5.4 % (ref 4.8–5.6)
HDLC SERPL-MCNC: 53 MG/DL
LDLC SERPL CALC-MCNC: 148 MG/DL (ref 0–99)
POTASSIUM SERPL-SCNC: 4.1 MMOL/L (ref 3.5–5.2)
TRIGL SERPL-MCNC: 203 MG/DL (ref 0–149)
TSH SERPL-ACNC: 2.28 UIU/ML (ref 0.45–4.5)

## 2019-08-20 ENCOUNTER — TELEPHONE (OUTPATIENT)
Dept: FAMILY MEDICINE | Facility: OTHER | Age: 49
End: 2019-08-20

## 2019-08-20 NOTE — LETTER
August 20, 2019      Kesha David  [address] [address] [place] [id number]              Dear Kesha,    Your [facility] Team works hard to make sure that you and your family receive exceptional care. Enclosed you will find a copy of the Asthma Control Test (ACT) that our clinic uses to monitor and manage your asthma. This test is an assessment tool that we use to determine how well your asthma is controlled.  Please keep a copy of the ACT for your reference when we call you to complete this assessment.   If you have [facility] [employer] we can send you a link to complete the Asthma Control Test through [employer]. If you are interested in signing up for [employer], please stop in at your nearest [facility] and any staff member will be able to assist you.    Sincerely,    Your [facility] Care Team

## 2019-08-20 NOTE — TELEPHONE ENCOUNTER
Panel Management Review      Patient has the following on her problem list:       Composite cancer screening  Chart review shows that this patient is due/due soon for the following   Summary:    Patient is due/failing the following:   HONEY, ACT, BP CHECK and PHYSICAL    Action needed:   Patient needs office visit for physical  .    Type of outreach:    Copy of ACT mailed to patient, will reach out in 5 days.    Questions for provider review:    None                                                                                                                                    Bushra VILLEGAS LPN       Chart routed to Bushra VILLEGAS LPN   .

## 2019-08-22 DIAGNOSIS — F41.1 ANXIETY STATE: ICD-10-CM

## 2019-08-22 RX ORDER — PROPRANOLOL HYDROCHLORIDE 20 MG/1
TABLET ORAL
Qty: 60 TABLET | Refills: 0 | Status: SHIPPED | OUTPATIENT
Start: 2019-08-22 | End: 2019-09-24

## 2019-08-22 NOTE — TELEPHONE ENCOUNTER
Routing refill request to provider for review/approval because:  Labs out of range:  BP  Patient needs to be seen because:  Due for OV    AWILDA Longoria, RN  [facility]

## 2019-08-22 NOTE — TELEPHONE ENCOUNTER
"Requested Prescriptions   Pending Prescriptions Disp Refills     propranolol (INDERAL) 20 MG tablet [Pharmacy Med Name: PROPRANOLOL HCL 20MG TABS] 180 tablet 1     Sig: TAKE ONE TABLET BY MOUTH TWICE A DAY   Last Written Prescription Date:  1/23/19  Last Fill Quantity: 180,  # refills: 1   Last office visit: 6/12/2018 with prescribing provider:  6/12/18   Future Office Visit:        Beta-Blockers Protocol Failed - 8/22/2019  5:29 AM        Failed - Blood pressure under 140/90 in past 12 months     BP Readings from Last 3 Encounters:   06/14/19 (!) 133/96   06/06/19 (!) 177/95   04/23/19 128/85                 Passed - Patient is age 6 or older        Passed - Recent (12 mo) or future (30 days) visit within the authorizing provider's specialty     Patient had office visit in the last 12 months or has a visit in the next 30 days with authorizing provider or within the authorizing provider's specialty.  See \"Patient Info\" tab in inbasket, or \"Choose Columns\" in Meds & Orders section of the refill encounter.              Passed - Medication is active on med list        "

## 2019-09-03 ENCOUNTER — MYC MEDICAL ADVICE (OUTPATIENT)
Dept: FAMILY MEDICINE | Facility: OTHER | Age: 49
End: 2019-09-03

## 2019-09-06 ENCOUNTER — OFFICE VISIT (OUTPATIENT)
Dept: FAMILY MEDICINE | Facility: OTHER | Age: 49
End: 2019-09-06
Payer: COMMERCIAL

## 2019-09-06 VITALS
SYSTOLIC BLOOD PRESSURE: 132 MMHG | TEMPERATURE: 97.5 F | HEART RATE: 76 BPM | RESPIRATION RATE: 16 BRPM | OXYGEN SATURATION: 98 % | BODY MASS INDEX: 38.41 KG/M2 | DIASTOLIC BLOOD PRESSURE: 80 MMHG | WEIGHT: 238 LBS

## 2019-09-06 DIAGNOSIS — E66.01 MORBID OBESITY (H): ICD-10-CM

## 2019-09-06 DIAGNOSIS — N95.1 MENOPAUSAL SYNDROME (HOT FLASHES): ICD-10-CM

## 2019-09-06 DIAGNOSIS — R09.81 NASAL CONGESTION: Primary | ICD-10-CM

## 2019-09-06 DIAGNOSIS — F33.0 MAJOR DEPRESSIVE DISORDER, RECURRENT EPISODE, MILD (H): ICD-10-CM

## 2019-09-06 DIAGNOSIS — L73.2 HIDRADENITIS SUPPURATIVA: ICD-10-CM

## 2019-09-06 PROCEDURE — 99214 OFFICE O/P EST MOD 30 MIN: CPT | Performed by: PHYSICIAN ASSISTANT

## 2019-09-06 RX ORDER — CLINDAMYCIN PHOSPHATE 10 MG/G
GEL TOPICAL 2 TIMES DAILY
Qty: 60 G | Refills: 0 | Status: SHIPPED | OUTPATIENT
Start: 2019-09-06 | End: 2020-11-13

## 2019-09-06 RX ORDER — FLUTICASONE PROPIONATE 50 MCG
1 SPRAY, SUSPENSION (ML) NASAL DAILY
Qty: 18.2 ML | Refills: 3 | Status: SHIPPED | OUTPATIENT
Start: 2019-09-06 | End: 2020-11-13

## 2019-09-06 RX ORDER — ALBUTEROL SULFATE 0.83 MG/ML
2.5 SOLUTION RESPIRATORY (INHALATION) EVERY 6 HOURS PRN
COMMUNITY
End: 2021-02-18

## 2019-09-06 ASSESSMENT — ASTHMA QUESTIONNAIRES
EMERGENCY_ROOM_LAST_YEAR_TOTAL: ONE
QUESTION_2 LAST FOUR WEEKS HOW OFTEN HAVE YOU HAD SHORTNESS OF BREATH: ONCE OR TWICE A WEEK
QUESTION_5 LAST FOUR WEEKS HOW WOULD YOU RATE YOUR ASTHMA CONTROL: WELL CONTROLLED
ACT_TOTALSCORE: 23
QUESTION_1 LAST FOUR WEEKS HOW MUCH OF THE TIME DID YOUR ASTHMA KEEP YOU FROM GETTING AS MUCH DONE AT WORK, SCHOOL OR AT HOME: NONE OF THE TIME
QUESTION_4 LAST FOUR WEEKS HOW OFTEN HAVE YOU USED YOUR RESCUE INHALER OR NEBULIZER MEDICATION (SUCH AS ALBUTEROL): NOT AT ALL
QUESTION_3 LAST FOUR WEEKS HOW OFTEN DID YOUR ASTHMA SYMPTOMS (WHEEZING, COUGHING, SHORTNESS OF BREATH, CHEST TIGHTNESS OR PAIN) WAKE YOU UP AT NIGHT OR EARLIER THAN USUAL IN THE MORNING: NOT AT ALL

## 2019-09-06 ASSESSMENT — ANXIETY QUESTIONNAIRES
7. FEELING AFRAID AS IF SOMETHING AWFUL MIGHT HAPPEN: NOT AT ALL
IF YOU CHECKED OFF ANY PROBLEMS ON THIS QUESTIONNAIRE, HOW DIFFICULT HAVE THESE PROBLEMS MADE IT FOR YOU TO DO YOUR WORK, TAKE CARE OF THINGS AT HOME, OR GET ALONG WITH OTHER PEOPLE: NOT DIFFICULT AT ALL
1. FEELING NERVOUS, ANXIOUS, OR ON EDGE: NOT AT ALL
3. WORRYING TOO MUCH ABOUT DIFFERENT THINGS: NOT AT ALL
2. NOT BEING ABLE TO STOP OR CONTROL WORRYING: NOT AT ALL
6. BECOMING EASILY ANNOYED OR IRRITABLE: SEVERAL DAYS
5. BEING SO RESTLESS THAT IT IS HARD TO SIT STILL: NOT AT ALL
GAD7 TOTAL SCORE: 2

## 2019-09-06 ASSESSMENT — PATIENT HEALTH QUESTIONNAIRE - PHQ9
5. POOR APPETITE OR OVEREATING: SEVERAL DAYS
SUM OF ALL RESPONSES TO PHQ QUESTIONS 1-9: 5

## 2019-09-06 ASSESSMENT — PAIN SCALES - GENERAL: PAINLEVEL: NO PAIN (0)

## 2019-09-06 NOTE — TELEPHONE ENCOUNTER
Panel Management Review      Patient has the following on her problem list:       Composite cancer screening  Chart review shows that this patient is due/due soon for the following   Summary:    Patient is due/failing the following:   ACT, BP CHECK and PHYSICAL    Action needed:   Patient needs to do ACT.    Type of outreach:    Sent [employer] message.    Questions for provider review:    None                                                                                                                                    Bushra VILLEGAS LPN       Chart routed to Bushra VILLEGAS LPN   .

## 2019-09-06 NOTE — NURSING NOTE
Health Maintenance Due   Topic Date Due     PREVENTIVE CARE VISIT  09/22/2017     ASTHMA ACTION PLAN  12/01/2018     HONEY ASSESSMENT  06/12/2019     ASTHMA CONTROL TEST  08/01/2019     INFLUENZA VACCINE (1) 09/01/2019     Bushra VILLEGAS LPN

## 2019-09-06 NOTE — PROGRESS NOTES
Subjective     Kesha David is a 49 year old female who presents to clinic today for the following health issues:    HPI   Anxiety Follow-Up    How are you doing with your anxiety since your last visit? Improved     Are you having other symptoms that might be associated with anxiety? No    Have you had a significant life event? No     Are you feeling depressed? No    Do you have any concerns with your use of alcohol or other drugs? No    Social History     Tobacco Use     Smoking status: Former Smoker     Packs/day: 0.50     Types: Cigarettes     Last attempt to quit: 2013     Years since quittin.1     Smokeless tobacco: Never Used     Tobacco comment: Thinking about quiting    Substance Use Topics     Alcohol use: Yes     Alcohol/week: 0.0 oz     Drug use: No     HONEY-7 SCORE 2018   Total Score 12 14 2     PHQ 7/15/2019 2019 2019   PHQ-9 Total Score 0 6 5   Q9: Thoughts of better off dead/self-harm past 2 weeks Not at all Not at all Not at all     No flowsheet data found.  No flowsheet data found.        How many servings of fruits and vegetables do you eat daily?  2-3    On average, how many sweetened beverages do you drink each day (soda, juice, sweet tea, etc)?   1  How many days per week do you miss taking your medication? 1    What makes it hard for you to take your medications?  forgetting to take before bedtime.        Concern - check boils  Onset: 1 year    Description:   boils    Intensity: mild    Progression of Symptoms:  same    Accompanying Signs & Symptoms:  nothing    Previous history of similar problem:   YES    Precipitating factors:   Worsened by: smoking    Alleviating factors:  Improved by: nothing    Therapies Tried and outcome: boil cream, no relief, hot baths; slight relief      Patient is a 49 year old female who presents to the clinic to discuss concerns about boils in her nose, rash under her arms/in groin and depression/anxiety. Patient states  that she grooms her nose frequently to minimize protruding nose hairs. As such she is very aware of the condition of the tissue of her nasal passages. She has noticed they become quite tender off/on with small pimple like growths. She has used bacitracin on these growths with some relief. As she has a history of hidradenitis suppurativa she is concerned about the rash starting in her nose. Provided education on the pathophysiology of HS and the more common areas that it would occur. No lesions present in the nose today.     Patient prefers to manage her symptoms with homeopathic or complementary alternative medicines. She brought a long list of labs that a local hemopathic shop had her complete. These included CBC, CMP, Lipid panel, LFTs, pancreatic enzymes, urinary analysis, A1c, estrogen and progesterone, TSH and more. Reviewed the labs with the patient during the visit as well as the showed the patient how to use an ASCVD risk calculator. Current risk was <5%.        Reviewed and updated as needed this visit by Provider         Review of Systems   ROS COMP: Constitutional, HEENT, cardiovascular, pulmonary, gi and gu systems are negative, except as otherwise noted.      Objective    /80 (BP Location: Left arm, Patient Position: Chair, Cuff Size: Adult Large)   Pulse 76   Temp 97.5  F (36.4  C) (Oral)   Resp 16   Wt 108 kg (238 lb)   SpO2 98%   BMI 38.41 kg/m    Body mass index is 38.41 kg/m .  Physical Exam   GENERAL: healthy, alert and no distress  EYES: Eyes grossly normal to inspection, PERRL and conjunctivae and sclerae normal  HENT: ear canals and TM's normal, nose mildly congested and mouth without ulcers or lesions  NECK: no adenopathy, no asymmetry, masses, or scars and thyroid normal to palpation  RESP: lungs clear to auscultation - no rales, rhonchi or wheezes  CV: regular rate and rhythm, normal S1 S2, no S3 or S4, no murmur, click or rub, no peripheral edema and peripheral pulses  "strong  ABDOMEN: soft, nontender, no hepatosplenomegaly, no masses and bowel sounds normal  MS: no gross musculoskeletal defects noted, no edema  NEURO: Normal strength and tone, mentation intact and speech normal  PSYCH: mentation appears normal and anxious    Diagnostic Test Results:  Labs reviewed and copy obtained to be scanned into records.         Assessment & Plan     1. Nasal congestion  Recommend the patient begin use of a nasal spray to help with discomfort in nose. She may continue use of bacitracin as needed for sores. Suspect that if she is pulling hairs or shaving/buzzer the areas may be getting exposed to environmental pathogens.   - fluticasone (FLONASE) 50 MCG/ACT nasal spray; Spray 1 spray into both nostrils daily  Dispense: 18.2 mL; Refill: 3    2. Hidradenitis suppurativa  Start use of topical antibiotic twice daily. Educational material offered, patient declined.   - clindamycin (CLINDAMAX) 1 % external gel; Apply topically 2 times daily  Dispense: 60 g; Refill: 0    3. Major depressive disorder, recurrent episode, mild (H)  Currently managing symptoms with zoloft. No changes will be made today. No thoughts of SI/HI.    4. Menopausal syndrome (hot flashes)  Propanolol will help with hot flashes. Of the lab work that the patient had completed, there was not a FSH level. Discussed with patient the changes to the hormonal levels with menopause. If symptoms worsen consider FSH level.     5. Morbid obesity (H)  Maintain a healthy diet low in salts/sugars/fatty&greasy foods with regular servings of fruits and vegetables. In addition, 30 minutes of aerobic exercise 5 or more days each week as able.          BMI:   Estimated body mass index is 38.41 kg/m  as calculated from the following:    Height as of 6/6/19: 1.676 m (5' 6\").    Weight as of this encounter: 108 kg (238 lb).   Weight management plan: Discussed healthy diet and exercise guidelines        Darrell Joseph PA-C  [facility] " [place]

## 2019-09-07 ASSESSMENT — ASTHMA QUESTIONNAIRES: ACT_TOTALSCORE: 23

## 2019-09-07 ASSESSMENT — ANXIETY QUESTIONNAIRES: GAD7 TOTAL SCORE: 2

## 2019-09-24 DIAGNOSIS — F41.1 ANXIETY STATE: ICD-10-CM

## 2019-09-25 RX ORDER — PROPRANOLOL HYDROCHLORIDE 20 MG/1
20 TABLET ORAL 2 TIMES DAILY
Qty: 60 TABLET | Refills: 5 | Status: SHIPPED | OUTPATIENT
Start: 2019-09-25 | End: 2020-07-13

## 2019-09-25 NOTE — TELEPHONE ENCOUNTER
"  Requested Prescriptions   Pending Prescriptions Disp Refills     propranolol (INDERAL) 20 MG tablet [Pharmacy Med Name: PROPRANOLOL HCL 20MG TABS] 60 tablet 0     Sig: TAKE ONE TABLET BY MOUTH TWICE A DAY -- (NEED TO BE SEEN IN CLINIC FOR FURTHER REFILLS)   Last Written Prescription Date:  8/22/2019  Last Fill Quantity: 60,  # refills: 0   Last office visit: 9/6/2019    Future Office Visit:        Beta-Blockers Protocol Passed - 9/24/2019  6:36 PM        Passed - Blood pressure under 140/90 in past 12 months     BP Readings from Last 3 Encounters:   09/06/19 132/80   06/14/19 (!) 133/96   06/06/19 (!) 177/95           Passed - Patient is age 6 or older        Passed - Recent (12 mo) or future (30 days) visit within the authorizing provider's specialty     Patient had office visit in the last 12 months or has a visit in the next 30 days with authorizing provider or within the authorizing provider's specialty.  See \"Patient Info\" tab in inbasket, or \"Choose Columns\" in Meds & Orders section of the refill encounter.              Passed - Medication is active on med list      Prescription approved per [facility] Refill Protocol.  Gayathri Subramanian RN      "

## 2019-10-14 DIAGNOSIS — F41.1 ANXIETY STATE: ICD-10-CM

## 2019-10-14 RX ORDER — SERTRALINE HYDROCHLORIDE 100 MG/1
TABLET, FILM COATED ORAL
Qty: 90 TABLET | Refills: 1 | Status: SHIPPED | OUTPATIENT
Start: 2019-10-14 | End: 2020-11-13

## 2019-10-14 NOTE — TELEPHONE ENCOUNTER
"Zoloft  Last Written Prescription Date:  01/03/2019  Last Fill Quantity: 90,  # refills: 1   Last office visit: 09/06/2019 with prescribing provider:  Jake   Future Office Visit:  None  Prescription approved per [facility] Refill Protocol.    Requested Prescriptions   Pending Prescriptions Disp Refills     sertraline (ZOLOFT) 100 MG tablet [Pharmacy Med Name: SERTRALINE HCL 100MG TABS] 90 tablet 1     Sig: TAKE ONE TABLET BY MOUTH EVERY DAY       SSRIs Protocol Passed - 10/14/2019  3:39 PM        Passed - Recent (12 mo) or future (30 days) visit within the authorizing provider's specialty     Patient has had an office visit with the authorizing provider or a provider within the authorizing providers department within the previous 12 mos or has a future within next 30 days. See \"Patient Info\" tab in inbasket, or \"Choose Columns\" in Meds & Orders section of the refill encounter.          Passed - Medication is active on med list        Passed - Patient is age 18 or older        Passed - No active pregnancy on record        Passed - No positive pregnancy test in last 12 months      Tarsha Saldaña RN   "

## 2019-11-27 DIAGNOSIS — J45.30 MILD PERSISTENT ASTHMA WITHOUT COMPLICATION: ICD-10-CM

## 2019-11-27 NOTE — TELEPHONE ENCOUNTER
"  Requested Prescriptions   Pending Prescriptions Disp Refills     ADVAIR DISKUS 250-50 MCG/DOSE inhaler [Pharmacy Med Name: ADVAIR DISKUS 250-50MCG/DOSE AEPB]  4     Sig: INHALE ONE PUFF BY MOUTH TWICE A DAY   Last Written Prescription Date:  4/30/2019  Last Fill Quantity: 1 inhaler,  # refills: 4   Last office visit: 9/6/2019 with prescribing provider:     Future Office Visit:        Inhaled Steroids Protocol Passed - 11/27/2019 11:14 AM        Passed - Patient is age 12 or older        Passed - Asthma control assessment score within normal limits in last 6 months     Please review ACT score.   ACT Total Scores 6/13/2018 2/1/2019 9/6/2019   ACT TOTAL SCORE (Goal Greater than or Equal to 20) 19 21 23   In the past 12 months, how many times did you visit the emergency room for your asthma without being admitted to the hospital? 0 0 1   In the past 12 months, how many times were you hospitalized overnight because of your asthma? 0 0 0             Passed - Medication is active on med list        Passed - Recent (6 mo) or future (30 days) visit within the authorizing provider's specialty     Patient had office visit in the last 6 months or has a visit in the next 30 days with authorizing provider or within the authorizing provider's specialty.  See \"Patient Info\" tab in inbasket, or \"Choose Columns\" in Meds & Orders section of the refill encounter.          Prescription approved per [facility] Refill Protocol.  Gayathri Subramanian RN      "

## 2019-12-08 ENCOUNTER — HEALTH MAINTENANCE LETTER (OUTPATIENT)
Age: 49
End: 2019-12-08

## 2020-07-13 DIAGNOSIS — F41.1 ANXIETY STATE: ICD-10-CM

## 2020-07-13 RX ORDER — PROPRANOLOL HYDROCHLORIDE 20 MG/1
TABLET ORAL
Qty: 60 TABLET | Refills: 0 | Status: SHIPPED | OUTPATIENT
Start: 2020-07-13 | End: 2020-11-24

## 2020-07-13 NOTE — TELEPHONE ENCOUNTER
Routing refill request to provider for review/approval because:  Drug interaction warning    TIM LongoriaN, RN  [facility]

## 2020-07-15 DIAGNOSIS — J45.30 MILD PERSISTENT ASTHMA WITHOUT COMPLICATION: ICD-10-CM

## 2020-07-15 NOTE — TELEPHONE ENCOUNTER
Routing refill request to provider for review/approval because:  ACT overdue    TIM LongoriaN, RN  [facility]

## 2020-08-06 ENCOUNTER — HOSPITAL ENCOUNTER (OUTPATIENT)
Dept: BEHAVIORAL HEALTH | Facility: CLINIC | Age: 50
Discharge: HOME | End: 2020-08-06
Attending: PSYCHIATRY & NEUROLOGY | Admitting: PSYCHIATRY & NEUROLOGY
Payer: COMMERCIAL

## 2020-08-06 DIAGNOSIS — F10.21 ALCOHOL USE DISORDER, MODERATE, IN EARLY REMISSION (H): ICD-10-CM

## 2020-08-06 DIAGNOSIS — F33.1 MAJOR DEPRESSIVE DISORDER, RECURRENT EPISODE, MODERATE (H): Primary | ICD-10-CM

## 2020-08-06 DIAGNOSIS — F41.1 GAD (GENERALIZED ANXIETY DISORDER): ICD-10-CM

## 2020-08-06 PROCEDURE — 90791 PSYCH DIAGNOSTIC EVALUATION: CPT | Mod: 95 | Performed by: COUNSELOR

## 2020-08-06 NOTE — PROGRESS NOTES
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 type:  Dual Diagnosis    Staff Name/Credentials:  KUNAL Harden, EMMA, PAT  August 6, 2020

## 2020-08-06 NOTE — PROGRESS NOTES
"Outpatient Mental Health Services - Adult    MY COPING PLAN FOR SAFETY    PATIENT'S NAME: Kesha David  MRN:   [id number]    SAFETY PLAN:    Step 1: Warning signs / cues (Thoughts, images, mood, situation, behavior) that a crisis may be developing:      Thoughts: \"I'm a burden\" and \"I can't do this anymore\"    Images: None    Thinking Processes: ruminations (can't stop thinking about my problems) and highly critical and negative thoughts    Mood: worsening depression, hopelessness, helplessness and intense worry    Behaviors: isolating/withdrawing , using alcohol, not taking care of myself, not taking care of my responsibilities and sleeping too much    Situations: changes in symptoms, and relapse       Step 2: Coping strategies - Things I can do to take my mind off of my problems without contacting another person (relaxation technique, physical activity):      Distress Tolerance Strategies:  binge watch TV.     Physical Activities: She stated she has difficulty following through and does not have much stamina.     Focus on helpful thoughts:  \"I will get through this\"    Step 3: People and social settings that provide distraction:     Name: \"talk to my . He is supportive right now as long as I don't drink\"   Name: She stated she has 4 really good friends she could call at any time. But she doesn't like to show weakness, and she doesn't want to bring people down.       park     Step 4: Remind myself of people and things that are important to me and worth living for:  She doesn't understand the purpose of living right now. She realizes it's a chemical imbalance and she can take a medication and feel better. Then she wonders who the real me?      Step 5: When I am in crisis, I can ask these people to help me use my safety plan:     Name:   Phone: In phone   Name: Friends Phone: in phone.     Step 6: Making the environment safe:       secure medications, dispose of old medications , remove things I " could use to hurt myself and be around others     Step 7: Professionals or agencies I can contact during a crisis:      Suicide Prevention Lifeline: 7-205-815-TALK (9709)    Crisis Text Line Service (available 24 hours a day, 7 days a week): Text [place] to 855308    Call  **CRISIS (559134) from a cell phone to talk to a team of professionals who can help you.    Crisis Services By [facility]: Phone Number:   [place]     [phone]   [place]    [phone]   [place]    [phone]   [place]    [phone]   [place]    [phone]   [place] [phone]   [place]     [phone]       Call 911 or go to my nearest emergency department.     I helped develop this safety plan and agree to use it when needed.  I have been given a copy of this plan.        Today s date:  8/6/2020    Adapted from Safety Plan Template 2008 Karey Roberts and Manjinder Drew is reprinted with the express permission of the authors.  No portion of the Safety Plan Template may be reproduced without the express, written permission.  You can contact the authors at [email or web address] or [email or web address]

## 2020-08-18 ENCOUNTER — VIRTUAL VISIT (OUTPATIENT)
Dept: PSYCHOLOGY | Facility: CLINIC | Age: 50
End: 2020-08-18
Payer: COMMERCIAL

## 2020-08-18 DIAGNOSIS — F33.1 MAJOR DEPRESSIVE DISORDER, RECURRENT EPISODE, MODERATE (H): ICD-10-CM

## 2020-08-18 DIAGNOSIS — F10.21 ALCOHOL USE DISORDER, MODERATE, IN EARLY REMISSION (H): ICD-10-CM

## 2020-08-18 DIAGNOSIS — F41.1 GAD (GENERALIZED ANXIETY DISORDER): Primary | ICD-10-CM

## 2020-08-18 PROCEDURE — 90837 PSYTX W PT 60 MINUTES: CPT | Mod: 95 | Performed by: MARRIAGE & FAMILY THERAPIST

## 2020-08-18 ASSESSMENT — COLUMBIA-SUICIDE SEVERITY RATING SCALE - C-SSRS
4. HAVE YOU HAD THESE THOUGHTS AND HAD SOME INTENTION OF ACTING ON THEM?: NO
2. HAVE YOU ACTUALLY HAD ANY THOUGHTS OF KILLING YOURSELF LIFETIME?: YES
TOTAL  NUMBER OF ACTUAL ATTEMPTS LIFETIME: 3
TOTAL  NUMBER OF ABORTED OR SELF INTERRUPTED ATTEMPTS PAST 3 MONTHS: NO
LETHALITY/MEDICAL DAMAGE CODE FIRST ACTUAL ATTEMPT: MODERATELY SEVERE PHYSICAL DAMAGE, MEDICAL HOSPITALIZATION AND LIKELY INTENSIVE CARE REQUIRED
TOTAL  NUMBER OF ACTUAL ATTEMPTS PAST 3 MONTHS: 1
6. HAVE YOU EVER DONE ANYTHING, STARTED TO DO ANYTHING, OR PREPARED TO DO ANYTHING TO END YOUR LIFE?: NO
TOTAL  NUMBER OF INTERRUPTED ATTEMPTS PAST 3 MONTHS: NO
2. HAVE YOU ACTUALLY HAD ANY THOUGHTS OF KILLING YOURSELF?: YES
REASONS FOR IDEATION LIFETIME: EQUALLY TO GET ATTENTION, REVENGE OR A REACTION FROM OTHERS AND TO END/STOP THE PAIN
4. HAVE YOU HAD THESE THOUGHTS AND HAD SOME INTENTION OF ACTING ON THEM?: NO
REASONS FOR IDEATION PAST MONTH: EQUALLY TO GET ATTENTION, REVENGE OR A REACTION FROM OTHERS AND TO END/STOP THE PAIN
1. IN THE PAST MONTH, HAVE YOU WISHED YOU WERE DEAD OR WISHED YOU COULD GO TO SLEEP AND NOT WAKE UP?: YES
MOST LETHAL DATE: 65589
5. HAVE YOU STARTED TO WORK OUT OR WORKED OUT THE DETAILS OF HOW TO KILL YOURSELF? DO YOU INTEND TO CARRY OUT THIS PLAN?: YES
5. HAVE YOU STARTED TO WORK OUT OR WORKED OUT THE DETAILS OF HOW TO KILL YOURSELF? DO YOU INTEND TO CARRY OUT THIS PLAN?: YES
TOTAL  NUMBER OF ABORTED OR SELF INTERRUPTED ATTEMPTS PAST LIFETIME: NO
ATTEMPT LIFETIME: YES
LETHALITY/MEDICAL DAMAGE CODE MOST RECENT ACTUAL ATTEMPT: MODERATELY SEVERE PHYSICAL DAMAGE, MEDICAL HOSPITALIZATION AND LIKELY INTENSIVE CARE REQUIRED
1. IN THE PAST MONTH, HAVE YOU WISHED YOU WERE DEAD OR WISHED YOU COULD GO TO SLEEP AND NOT WAKE UP?: YES
MOST RECENT DATE: 65589
LETHALITY/MEDICAL DAMAGE CODE MOST LETHAL ACTUAL ATTEMPT: MODERATELY SEVERE PHYSICAL DAMAGE, MEDICAL HOSPITALIZATION AND LIKELY INTENSIVE CARE REQUIRED
3. HAVE YOU BEEN THINKING ABOUT HOW YOU MIGHT KILL YOURSELF?: NO
6. HAVE YOU EVER DONE ANYTHING, STARTED TO DO ANYTHING, OR PREPARED TO DO ANYTHING TO END YOUR LIFE?: NO
TOTAL  NUMBER OF INTERRUPTED ATTEMPTS LIFETIME: NO
FIRST ATTEMPT DATE: 65589
ATTEMPT PAST THREE MONTHS: YES

## 2020-08-18 NOTE — PROGRESS NOTES
Progress Note    Patient Name: Kesha David  Date: 8/18/20         Service Type: Individual      Session Start Time: 7:30 a.m.  Session End Time: 8:30 a.m.     Session Length: 60 minutes    Session #: 1 (but previously did DA with Chelle Plunkett, [facility], [facility])    Attendees: Client attended alone    Service Modality:  Video Visit:    Telemedicine Visit: The patient's condition can be safely assessed and treated via synchronous audio and visual telemedicine encounter.      Reason for Telemedicine Visit: Services only offered telehealth    Originating Site (Patient Location): Patient's home    Distant Site (Provider Location): [facility]    Consent:  The patient/guardian has verbally consented to: the potential risks and benefits of telemedicine (video visit) versus in person care; bill my insurance or make self-payment for services provided; and responsibility for payment of non-covered services.     Patient would like the video invitation sent by: Send to e-mail at: [email or web address]     Mode of Communication:  Video Conference via Amwell    As the provider I attest to compliance with applicable laws and regulations related to telemedicine.     Treatment Plan Last Reviewed: in development  PHQ-9 / HONEY-7 : 8/18/20    DATA  Interactive Complexity: No  Crisis: No       Progress Since Last Session (Related to Symptoms / Goals / Homework):   Symptoms: patient reports some improvement but unable to assess due to this being first session with this writer    Homework: N/A      Episode of Care Goals: N/A - will determine in future session     Current / Ongoing Stressors and Concerns:   History of alcoholism (two past treatments at [facility] in 2012 & 2014); has 28-year-old son; recently attempted suicide after relapse on 7/29/20 and was hospitalized; patient's  threatened divorce if she continues drinking; has stable employment; family strain  stemming from childhood; was raised [employer]; has  due to past DUIs (off in 2021)     Treatment Objective(s) Addressed in This Session:   maintain sobriety for three months  identify at least 5 example(s) of how drinking has resulted in an experience that interferes with person values or goals  identify three distraction and diversion activities and use those activities to decrease level of anxiety    Increase interest, engagement, and pleasure in doing things  Decrease frequency and intensity of feeling down, depressed, hopeless  Identify negative self-talk and behaviors: challenge core beliefs, myths, and actions  Improve concentration, focus, and mindfulness in daily activities   Create and use Safety Plan  identify 5 traits or charcteristics you like about yourself     Intervention:   CBT: connect thoughts, feelings, and actions  Psychodynamic: family systems and attachment  Solution Focused: identify solvable problems and generate possible solutions  Narrative Therapy        ASSESSMENT: Current Emotional / Mental Status (status of significant symptoms):   Risk status (Self / Other harm or suicidal ideation)   Patient denies current fears or concerns for personal safety.   Patient reports the following current or recent suicidal ideation or behaviors: attempted suicide by overdosing on alcohol, SSRIs and a few pain pills on 7/29/20.   Patient denies current or recent homicidal ideation or behaviors.   Patient denies current or recent self injurious behavior or ideation.   Patient denies other safety concerns.   Patient reports there has been no change in risk factors since their last session.     Patient reports there has been no change in protective factors since their last session.     A safety and risk management plan has been developed including: Patient consented to co-developed safety plan.  Safety and risk management plan was completed.  Patient agreed to use safety plan should any safety  concerns arise.  A copy was given to the patient. Plan was developed on 8/6/20 w/EMMA Harden LADC     Appearance:   Appropriate    Eye Contact:   Good    Psychomotor Behavior: Normal  Agitated    Attitude:   Cooperative  Interested Friendly   Orientation:   All   Speech    Rate / Production: Talkative Normal     Volume:  Normal    Mood:    Normal Sad  Agitated   Affect:    Appropriate  Expansive    Thought Content:  Clear  Paranoia  Rumination    Thought Form:  Coherent  Logical    Insight:    Good  and Fair      Medication Review:   No changes to current psychiatric medication(s)     Medication Compliance:   Yes     Changes in Health Issues:   None reported     Chemical Use Review:   Substance Use: Chemical use reviewed, no active concerns identified  - will continue to monitor due to recent alcohol abuse     Tobacco Use: Yes, no change.  Patient reports frequency of use 5-6x/day. Patient declined discussion at this time    Diagnosis:  1. HONEY (generalized anxiety disorder)    2. Major depressive disorder, recurrent episode, moderate (H)    3. Alcohol use disorder, moderate, in early remission (H)        Collateral Reports Completed:   Communicated with: EMMA Harden LADC    PLAN: (Patient Tasks / Therapist Tasks / Other)  Patient scheduled again for Wed., 8/26 at 4 p.m.  Patient has Safety Plan on file co-created with EMMA Harden LADC.        Sherley Pratt

## 2020-08-24 DIAGNOSIS — J45.30 MILD PERSISTENT ASTHMA WITHOUT COMPLICATION: ICD-10-CM

## 2020-08-25 NOTE — TELEPHONE ENCOUNTER
Routing refill request to provider for review/approval because:  ACT overdue    TIM LongoriaN, RN  [facility]

## 2020-08-26 ENCOUNTER — VIRTUAL VISIT (OUTPATIENT)
Dept: PSYCHOLOGY | Facility: CLINIC | Age: 50
End: 2020-08-26
Payer: COMMERCIAL

## 2020-08-26 DIAGNOSIS — F10.21 ALCOHOL USE DISORDER, MODERATE, IN EARLY REMISSION (H): ICD-10-CM

## 2020-08-26 DIAGNOSIS — F33.1 MAJOR DEPRESSIVE DISORDER, RECURRENT EPISODE, MODERATE (H): Primary | ICD-10-CM

## 2020-08-26 DIAGNOSIS — F41.1 GAD (GENERALIZED ANXIETY DISORDER): ICD-10-CM

## 2020-08-26 PROCEDURE — 90834 PSYTX W PT 45 MINUTES: CPT | Mod: 95 | Performed by: MARRIAGE & FAMILY THERAPIST

## 2020-08-26 NOTE — PROGRESS NOTES
"                                           Progress Note    Patient Name: Kesha David  Date: 8/26/20         Service Type: Individual      Session Start Time: 3:30 p.m.  Session End Time: 4:22 p.m.     Session Length: 52 minutes    Session #: 2 (but previously did DA with Chelle Plunkett, [facility], [facility])    Attendees: Client attended alone    Service Modality:  Video Visit:    Telemedicine Visit: The patient's condition can be safely assessed and treated via synchronous audio and visual telemedicine encounter.      Reason for Telemedicine Visit: Services only offered telehealth    Originating Site (Patient Location): Patient's home    Distant Site (Provider Location): [facility]    Consent:  The patient/guardian has verbally consented to: the potential risks and benefits of telemedicine (video visit) versus in person care; bill my insurance or make self-payment for services provided; and responsibility for payment of non-covered services.     Patient would like the video invitation sent by: Send to e-mail at: [email or web address]     Mode of Communication:  Video Conference via Amwell    As the provider I attest to compliance with applicable laws and regulations related to telemedicine.     Treatment Plan Last Reviewed: 8/26/20  PHQ-9 / HONEY-7 : 8/18/20    DATA  Interactive Complexity: No  Crisis: No       Progress Since Last Session (Related to Symptoms / Goals / Homework):   Symptoms: Improving as patient states that she has not been drinking and feels that her moods have been stable    Homework: Completed in session - discussed support system, reasons for relapse/\"slips\"; patient reports that she has remained sober      Episode of Care Goals: Satisfactory progress - ACTION (Actively working towards change); Intervened by reinforcing change plan / affirming steps taken     Current / Ongoing Stressors and Concerns:   Concerns about son; history of alcoholism (two past treatments at [facility] in 2012 & 2014); has 28-year-old son; recently attempted suicide after relapse on 7/29/20 and was hospitalized; patient's  threatened divorce if she continues drinking; has stable employment; family strain stemming from childhood; was raised [employer]; has  due to past DUIs (off in 2021)     Treatment Objective(s) Addressed in This Session:   maintain sobriety for three months  identify at least 5 example(s) of how drinking has resulted in an experience that interferes with person values or goals  identify three distraction and diversion activities and use those activities to decrease level of anxiety    Increase interest, engagement, and pleasure in doing things  Decrease frequency and intensity of feeling down, depressed, hopeless  Identify negative self-talk and behaviors: challenge core beliefs, myths, and actions  Improve concentration, focus, and mindfulness in daily activities   Create and use Safety Plan  identify 5 traits or charcteristics you like about yourself     Intervention:   CBT: connect thoughts, feelings, and actions  Psychodynamic: family systems and attachment  Solution Focused: identify solvable problems and generate possible solutions  Narrative Therapy        ASSESSMENT: Current Emotional / Mental Status (status of significant symptoms):   Risk status (Self / Other harm or suicidal ideation)   Patient denies current fears or concerns for personal safety.   Patient reports the following current or recent suicidal ideation or behaviors: attempted suicide by overdosing on alcohol, SSRIs and a few pain pills on 7/29/20; denies any ideation since late July 2020.   Patient denies current or recent homicidal ideation or behaviors.   Patient denies current or recent self injurious behavior or ideation.   Patient denies other safety concerns.   Patient reports there has been no change in risk factors since their last session.     Patient reports there has been no change  "in protective factors since their last session.     A safety and risk management plan has been developed including: Patient consented to co-developed safety plan.  Safety and risk management plan was completed.  Patient agreed to use safety plan should any safety concerns arise.  A copy was given to the patient. Plan was developed on 8/6/20 w/EMMA Harden LADC     Appearance:   Appropriate    Eye Contact:   Good    Psychomotor Behavior: Normal  Agitated    Attitude:   Cooperative  Interested Friendly   Orientation:   All   Speech    Rate / Production: Talkative Normal     Volume:  Normal    Mood:    Normal Sad  Agitated   Affect:    Appropriate  Expansive    Thought Content:  Clear  Paranoia  Rumination    Thought Form:  Coherent  Logical    Insight:    Good  and Fair      Medication Review:   No changes to current psychiatric medication(s)     Medication Compliance:   Yes     Changes in Health Issues:   None reported     Chemical Use Review:   Substance Use: Chemical use reviewed, no active concerns identified  - will continue to monitor due to recent alcohol abuse     Tobacco Use: Yes, no change.  Patient reports frequency of use 5-6x/day. Patient declined discussion at this time    Diagnosis:  1. Major depressive disorder, recurrent episode, moderate (H)    2. HONEY (generalized anxiety disorder)    3. Alcohol use disorder, moderate, in early remission (H)        Collateral Reports Completed:   Not Applicable    PLAN: (Patient Tasks / Therapist Tasks / Other)  Patient stats that her goals for the week include:    1. Have more \"good days\"  2. Sort through jewelery  3. Continue to socialize/reach out to people  4. Keep up with housework        Sherley Pratt                                                    Treatment Plan    Client's Name: Kesha David  YOB: 1970    Date: 8/26/20    DSM-V Diagnoses: 296.32 (F33.1) Major Depressive Disorder, Recurrent Episode, Moderate _, 300.02 (F41.1) " Generalized Anxiety Disorder or Substance-Related & Addictive Disorders Alcohol Use Disorder   303.90 (F10.20) Moderate In early remission,   Psychosocial / Contextual Factors: Concerns about son; history of alcoholism (two past treatments at [facility] in 2012 & 2014); has 28-year-old son; recently attempted suicide after relapse on 7/29/20 and was hospitalized; patient's  threatened divorce if she continues drinking; has stable employment; family strain stemming from childhood; was raised [employer]; has  due to past DUIs (off in 2021)    WHODAS 2.0 Total Score 8/6/2020   Total Score 37   Total Score MyChart 37     PHQ 7/15/2019 8/27/2019 9/6/2019   PHQ-9 Total Score 0 6 5   Q9: Thoughts of better off dead/self-harm past 2 weeks Not at all Not at all Not at all   Some encounter information is confidential and restricted. Go to Review Flowsheets activity to see all data.     HONEY-7 SCORE 6/13/2018 9/6/2019 8/6/2020   Total Score - - 12 (moderate anxiety)   Total Score 14 2 -   Some encounter information is confidential and restricted. Go to Review Flowsheets activity to see all data.     Referral / Collaboration:  Referral to another professional/service is not indicated at this time.  Future referral may be to treatment if alcohol use reoccurs.    Anticipated number of sessions for this episode of care: 16-20    MeasurableTreatment Goal(s) related to diagnosis / functional impairment(s)  Goal 1: Client will maintain sobriety for the next three months and will increase sober supports.    Objective #A (Client Action)    Client will maintain sobriety for three months.  Status: New - Date: 8/26/20     Intervention(s)  Therapist will assign homework for patient to reach out to sober friends or attend support group at least 1x/week.    Objective #B  Client will identify at least 5 example(s) of how drinking has resulted in an experience that interferes with person values or goals.  Status: New - Date:  8/26/20     Intervention(s)  Therapist will collaborate w/patient in session to determine these and will discuss alternative ways to self-regulate.    Objective #C  Client will identify 5 traits or characteristics she likes about herself and will increase social interactions with sober supports (at least 1x/week).  Status: New - Date: 8/26/20     Intervention(s)  Therapist will collaborate in session and will assign homework for patient to ask loved ones how they view her.      Goal 2: Client will use her Safety Plan and will better recognize and cope with symptoms of depression.    Objective #A (Client Action)    Status: New - Date: 8/26/20     Client will create, update and use Safety Plan.    Intervention(s)  Therapist will update co-created Safety Plan as needed (pt has copy).    Objective #B  Client will identify negative self-talk and behaviors: challenge core beliefs, myths, and actions.    Status: New - Date: 8/26/20     Intervention(s)  Therapist will teach about Tato Drew's Power of Vulnerability and shame/guilt.    Objective #C  Client will decrease frequency and intensity of feeling down, depressed, hopeless.  Status: New - Date: 8/26/20     Intervention(s)  Therapist will teach assertiveness skills.        Goal 3: Client will discuss family systems and roots of her anxiety; she will then learn to better manage and challenge symptoms of anxiety.    Objective #A (Client Action)    Status: New - Date: 8/26/20     Client will increase interest, engagement, and pleasure in doing things.    Intervention(s)  Therapist will assign homework for patient to pay attention to practice emotional differentiation.    Objective #B  Client will identify three distraction and diversion activities and use those activities to decrease level of anxiety.    Status: New - Date: 8/26/20     Intervention(s)  Therapist will teach distraction skills.      Objective #C  Client will improve concentration, focus, and mindfulness in  daily activities.  Status: New - Date: 8/26/20     Intervention(s)  Therapist will teach mindfulness skills.      Patient has not reviewed nor agreed to the above plan.      Sherley Pratt  August 26, 2020

## 2020-09-01 ENCOUNTER — VIRTUAL VISIT (OUTPATIENT)
Dept: PSYCHOLOGY | Facility: CLINIC | Age: 50
End: 2020-09-01
Payer: COMMERCIAL

## 2020-09-01 DIAGNOSIS — F10.21 ALCOHOL USE DISORDER, MODERATE, IN EARLY REMISSION (H): ICD-10-CM

## 2020-09-01 DIAGNOSIS — F33.1 MAJOR DEPRESSIVE DISORDER, RECURRENT EPISODE, MODERATE (H): Primary | ICD-10-CM

## 2020-09-01 DIAGNOSIS — F41.1 GAD (GENERALIZED ANXIETY DISORDER): ICD-10-CM

## 2020-09-01 PROCEDURE — 90837 PSYTX W PT 60 MINUTES: CPT | Mod: 95 | Performed by: MARRIAGE & FAMILY THERAPIST

## 2020-09-01 NOTE — PROGRESS NOTES
Progress Note    Patient Name: Kesha David  Date: 9/1/20         Service Type: Individual      Session Start Time: 7:30 a.m.  Session End Time: 8:27 a.m.     Session Length: 57 minutes    Session #: 3 (previously did DA with Chelle Plunkett, [facility], [facility])    Attendees: Client attended alone    Service Modality:  Video Visit:    Telemedicine Visit: The patient's condition can be safely assessed and treated via synchronous audio and visual telemedicine encounter.      Reason for Telemedicine Visit: Services only offered telehealth    Originating Site (Patient Location): Patient's home    Distant Site (Provider Location): [facility]    Consent:  The patient/guardian has verbally consented to: the potential risks and benefits of telemedicine (video visit) versus in person care; bill my insurance or make self-payment for services provided; and responsibility for payment of non-covered services.     Patient would like the video invitation sent by: Send to e-mail at: [email or web address]     Mode of Communication:  Video Conference via Amwell    As the provider I attest to compliance with applicable laws and regulations related to telemedicine.     Treatment Plan Last Reviewed: 8/26/20  PHQ-9 / HONEY-7 : 8/18/20    DATA  Interactive Complexity: No  Crisis: No       Progress Since Last Session (Related to Symptoms / Goals / Homework):   Symptoms: Improving as patient states that she has not been drinking but reports some lack of motivation and self-critical thoughts    Homework: Completed in session - completed Attachment Assessment (patient scored highest in Avoidant Attachment); patient also connected socially and cleaned her home      Episode of Care Goals: Satisfactory progress - ACTION (Actively working towards change); Intervened by reinforcing change plan / affirming steps taken     Current / Ongoing Stressors and Concerns:   Concerns about son  and his alcohol use; history of alcoholism (two past treatments at [facility] in 2012 & 2014); has 28-year-old son; recently attempted suicide after relapse on 7/29/20 and was hospitalized; patient's  threatened divorce if she continues drinking; has stable employment; family strain stemming from childhood; was raised [employer]; has  due to past DUIs (off in 2021); childhood diagnosis of ADHD     Treatment Objective(s) Addressed in This Session:   identify 5 traits or charcteristics you like about yourself: discuss others' perceptions of patient  maintain sobriety for three months  identify at least 5 example(s) of how drinking has resulted in an experience that interferes with person values or goals  identify three distraction and diversion activities and use those activities to decrease level of anxiety    Increase interest, engagement, and pleasure in doing things  Past:  Decrease frequency and intensity of feeling down, depressed, hopeless  Identify negative self-talk and behaviors: challenge core beliefs, myths, and actions  Improve concentration, focus, and mindfulness in daily activities   Create and use Safety Plan       Intervention:   CBT: connect thoughts, feelings, and actions  Psychodynamic: family systems and attachment  Solution Focused: identify solvable problems and generate possible solutions  Narrative Therapy        ASSESSMENT: Current Emotional / Mental Status (status of significant symptoms):   Risk status (Self / Other harm or suicidal ideation)   Patient denies current fears or concerns for personal safety.   Patient reports the following current or recent suicidal ideation or behaviors: attempted suicide by overdosing on alcohol, SSRIs and a few pain pills on 7/29/20; denies any ideation since late July 2020.   Patient denies current or recent homicidal ideation or behaviors.   Patient denies current or recent self injurious behavior or ideation.   Patient denies other  "safety concerns.   Patient reports there has been no change in risk factors since their last session.     Patient reports there has been no change in protective factors since their last session.     A safety and risk management plan has been developed including: Patient consented to co-developed safety plan.  Safety and risk management plan was completed.  Patient agreed to use safety plan should any safety concerns arise.  A copy was given to the patient. Plan was developed on 8/6/20 w/EMMA Harden LADC     Appearance:   Appropriate    Eye Contact:   Good    Psychomotor Behavior: Normal  Restless    Attitude:   Cooperative  Interested Friendly   Orientation:   All   Speech    Rate / Production: Talkative Normal     Volume:  Normal    Mood:    Normal Sad  Fearful   Affect:    Appropriate  Expansive  (will sometimes laugh when discussing difficulties)    Thought Content:  Clear  Paranoia  Rumination    Thought Form:  Coherent  Neurosis Magical thinking   Insight:    Good  and Fair      Medication Review:   No changes to current psychiatric medication(s)     Medication Compliance:   Yes     Changes in Health Issues:   None reported     Chemical Use Review:   Substance Use: Chemical use reviewed, no active concerns identified  - will continue to monitor due to recent alcohol abuse     Tobacco Use: Yes, no change.  Patient reports frequency of use 5-6x/day. Patient declined discussion at this time    Diagnosis:  1. Major depressive disorder, recurrent episode, moderate (H)    2. HONEY (generalized anxiety disorder)    3. Alcohol use disorder, moderate, in early remission (H)        Collateral Reports Completed:   Not Applicable    PLAN: (Patient Tasks / Therapist Tasks / Other)    Patient states that her goals for the next week include:    1. \"Be organized and packed up for weekend with family\" and take breaks as needed to avoid feeling overwhelmed  2. Journal (at least 2x)      Sherley Pratt                   "                                  Treatment Plan    Client's Name: Kesha David  YOB: 1970    Date: 8/26/20    DSM-V Diagnoses: 296.32 (F33.1) Major Depressive Disorder, Recurrent Episode, Moderate _, 300.02 (F41.1) Generalized Anxiety Disorder or Substance-Related & Addictive Disorders Alcohol Use Disorder   303.90 (F10.20) Moderate In early remission,   Psychosocial / Contextual Factors: Concerns about son; history of alcoholism (two past treatments at [facility] in 2012 & 2014); has 28-year-old son; recently attempted suicide after relapse on 7/29/20 and was hospitalized; patient's  threatened divorce if she continues drinking; has stable employment; family strain stemming from childhood; was raised [employer]; has  due to past DUIs (off in 2021)    WHODAS 2.0 Total Score 8/6/2020   Total Score 37   Total Score MyChart 37     PHQ 7/15/2019 8/27/2019 9/6/2019   PHQ-9 Total Score 0 6 5   Q9: Thoughts of better off dead/self-harm past 2 weeks Not at all Not at all Not at all   Some encounter information is confidential and restricted. Go to Review Flowsheets activity to see all data.     HONEY-7 SCORE 6/13/2018 9/6/2019 8/6/2020   Total Score - - 12 (moderate anxiety)   Total Score 14 2 -   Some encounter information is confidential and restricted. Go to Review Flowsheets activity to see all data.     Referral / Collaboration:  Referral to another professional/service is not indicated at this time.  Future referral may be to treatment if alcohol use reoccurs.    Anticipated number of sessions for this episode of care: 16-20    MeasurableTreatment Goal(s) related to diagnosis / functional impairment(s)  Goal 1: Client will maintain sobriety for the next three months and will increase sober supports.    Objective #A (Client Action)    Client will maintain sobriety for three months.  Status: New - Date: 8/26/20     Intervention(s)  Therapist will assign homework for patient to reach  out to sober friends or attend support group at least 1x/week.    Objective #B  Client will identify at least 5 example(s) of how drinking has resulted in an experience that interferes with person values or goals.  Status: New - Date: 8/26/20     Intervention(s)  Therapist will collaborate w/patient in session to determine these and will discuss alternative ways to self-regulate.    Objective #C  Client will identify 5 traits or characteristics she likes about herself and will increase social interactions with sober supports (at least 1x/week).  Status: New - Date: 8/26/20     Intervention(s)  Therapist will collaborate in session and will assign homework for patient to ask loved ones how they view her.      Goal 2: Client will use her Safety Plan and will better recognize and cope with symptoms of depression.    Objective #A (Client Action)    Status: New - Date: 8/26/20     Client will create, update and use Safety Plan.    Intervention(s)  Therapist will update co-created Safety Plan as needed (pt has copy).    Objective #B  Client will identify negative self-talk and behaviors: challenge core beliefs, myths, and actions.    Status: New - Date: 8/26/20     Intervention(s)  Therapist will teach about Tato Drew's Power of Vulnerability and shame/guilt.    Objective #C  Client will decrease frequency and intensity of feeling down, depressed, hopeless.  Status: New - Date: 8/26/20     Intervention(s)  Therapist will teach assertiveness skills.        Goal 3: Client will discuss family systems and roots of her anxiety; she will then learn to better manage and challenge symptoms of anxiety.    Objective #A (Client Action)    Status: New - Date: 8/26/20     Client will increase interest, engagement, and pleasure in doing things.    Intervention(s)  Therapist will assign homework for patient to pay attention to practice emotional differentiation.    Objective #B  Client will identify three distraction and diversion  activities and use those activities to decrease level of anxiety.    Status: New - Date: 8/26/20     Intervention(s)  Therapist will teach distraction skills.      Objective #C  Client will improve concentration, focus, and mindfulness in daily activities.  Status: New - Date: 8/26/20     Intervention(s)  Therapist will teach mindfulness skills.      Patient has reviewed and agreed to the above plan.      Sherley Pratt  August 26, 2020

## 2020-09-01 NOTE — PATIENT INSTRUCTIONS
"Patient states that her goals for the next week include:    1. \"Be organized and packed up for weekend with family\" and take breaks as needed to avoid feeling overwhelmed  2. Journal (at least 2x)  "

## 2020-09-10 ENCOUNTER — VIRTUAL VISIT (OUTPATIENT)
Dept: PSYCHOLOGY | Facility: CLINIC | Age: 50
End: 2020-09-10
Payer: COMMERCIAL

## 2020-09-10 DIAGNOSIS — F41.1 GAD (GENERALIZED ANXIETY DISORDER): Primary | ICD-10-CM

## 2020-09-10 DIAGNOSIS — F33.1 MAJOR DEPRESSIVE DISORDER, RECURRENT EPISODE, MODERATE (H): ICD-10-CM

## 2020-09-10 PROCEDURE — 90837 PSYTX W PT 60 MINUTES: CPT | Mod: 95 | Performed by: MARRIAGE & FAMILY THERAPIST

## 2020-09-10 NOTE — PATIENT INSTRUCTIONS
Patient states that her goals for the next week include:    1. Relax this weekend  2. Complete normal house chores  3. Mentally prepare for upheaval of routine due to son and his wife temporarily moving in

## 2020-09-10 NOTE — PROGRESS NOTES
"                                           Progress Note    Patient Name: Kesha David  Date: 9/10/2020         Service Type: Individual      Session Start Time: 7:32 a.m.  Session End Time: 8:32 a.m.     Session Length: 60 minutes    Session #: 4 (previously did DA with Chelle Plunkett, [facility], [facility])    Attendees: Client attended alone    Service Modality:  Video Visit:    Telemedicine Visit: The patient's condition can be safely assessed and treated via synchronous audio and visual telemedicine encounter.      Reason for Telemedicine Visit: Services only offered telehealth    Originating Site (Patient Location): Patient's home    Distant Site (Provider Location): [facility]    Consent:  The patient/guardian has verbally consented to: the potential risks and benefits of telemedicine (video visit) versus in person care; bill my insurance or make self-payment for services provided; and responsibility for payment of non-covered services.     Patient would like the video invitation sent by: Send to e-mail at: [email or web address]     Mode of Communication:  Video Conference via Amwell    As the provider I attest to compliance with applicable laws and regulations related to telemedicine.     Treatment Plan Last Reviewed: 8/26/20  PHQ-9 / HONEY-7 : 8/6/20    DATA  Interactive Complexity: No  Crisis: No       Progress Since Last Session (Related to Symptoms / Goals / Homework):   Symptoms: Improving as patient states that she has not been drinking and has been more aware of her inner emotional state    Homework: Partially completed - patient also connected socially but has not yet journaled      Episode of Care Goals: Satisfactory progress - ACTION (Actively working towards change); Intervened by reinforcing change plan / affirming steps taken     Current / Ongoing Stressors and Concerns:   Reports viewing life \"as a poker game\" (avoids showing her cards); concerns about son and his alcohol " use; history of alcoholism (two past treatments at [facility] in 2012 & 2014); has 28-year-old son; recently attempted suicide after relapse on 7/29/20 and was hospitalized; patient's  threatened divorce if she continues drinking; has stable employment; family strain stemming from childhood; was raised [employer]; has  due to past DUIs (off in 2021); childhood diagnosis of ADHD     Treatment Objective(s) Addressed in This Session:   identify 5 traits or charcteristics you like about yourself: discuss others' perceptions of patient  Discussed family system issues and patterns  Attachment Theory  maintain sobriety for three months  identify at least 5 example(s) of how drinking has resulted in an experience that interferes with person values or goals  identify three distraction and diversion activities and use those activities to decrease level of anxiety    Increase interest, engagement, and pleasure in doing things    Past:  Decrease frequency and intensity of feeling down, depressed, hopeless  Identify negative self-talk and behaviors: challenge core beliefs, myths, and actions  Improve concentration, focus, and mindfulness in daily activities   Create and use Safety Plan       Intervention:   CBT: connect thoughts, feelings, and actions  Psychodynamic: family systems and attachment  Solution Focused: identify solvable problems and generate possible solutions  Narrative Therapy        ASSESSMENT: Current Emotional / Mental Status (status of significant symptoms):   Risk status (Self / Other harm or suicidal ideation)   Patient denies current fears or concerns for personal safety.   Patient reports the following current or recent suicidal ideation or behaviors: attempted suicide by overdosing on alcohol, SSRIs and a few pain pills on 7/29/20; denies any ideation since late July 2020.   Patient denies current or recent homicidal ideation or behaviors.   Patient denies current or recent self  injurious behavior or ideation.   Patient denies other safety concerns.   Patient reports there has been no change in risk factors since their last session.     Patient reports there has been no change in protective factors since their last session.     A safety and risk management plan has been developed including: Patient consented to co-developed safety plan.  Safety and risk management plan was completed.  Patient agreed to use safety plan should any safety concerns arise.  A copy was given to the patient. Plan was developed on 8/6/20 w/EMMA Harden LADC     Appearance:   Appropriate    Eye Contact:   Good    Psychomotor Behavior: Normal  Restless    Attitude:   Cooperative  Interested Friendly   Orientation:   All   Speech    Rate / Production: Talkative Normal     Volume:  Normal    Mood:    Normal Sad  Fearful   Affect:    Appropriate  Expansive  (will sometimes laugh when discussing difficulties)    Thought Content:  Clear  Paranoia  Rumination    Thought Form:  Coherent  Neurosis Magical thinking   Insight:    Good  and Fair      Medication Review:   No changes to current psychiatric medication(s)     Medication Compliance:   Yes     Changes in Health Issues:   None reported     Chemical Use Review:   Substance Use: Chemical use reviewed, no active concerns identified  - will continue to monitor due to recent alcohol abuse     Tobacco Use: Yes, no change.  Patient reports frequency of use 5-6x/day. Patient declined discussion at this time    Diagnosis:  1. HONEY (generalized anxiety disorder)    2. Major depressive disorder, recurrent episode, moderate (H)        Collateral Reports Completed:   Not Applicable    PLAN: (Patient Tasks / Therapist Tasks / Other)    Patient states that her goals for the next week include:    1. Relax this weekend  2. Complete normal house chores  3. Mentally prepare for upheaval of routine due to son and his wife temporarily moving in      Merged with [facility]                                                     Treatment Plan    Client's Name: Kesha David  YOB: 1970    Date: 8/26/20    DSM-V Diagnoses: 296.32 (F33.1) Major Depressive Disorder, Recurrent Episode, Moderate _, 300.02 (F41.1) Generalized Anxiety Disorder or Substance-Related & Addictive Disorders Alcohol Use Disorder   303.90 (F10.20) Moderate In early remission,   Psychosocial / Contextual Factors: Concerns about son; history of alcoholism (two past treatments at [facility] in 2012 & 2014); has 28-year-old son; recently attempted suicide after relapse on 7/29/20 and was hospitalized; patient's  threatened divorce if she continues drinking; has stable employment; family strain stemming from childhood; was raised [employer]; has  due to past DUIs (off in 2021)    WHODAS 2.0 Total Score 8/6/2020   Total Score 37   Total Score MyChart 37     PHQ 7/15/2019 8/27/2019 9/6/2019   PHQ-9 Total Score 0 6 5   Q9: Thoughts of better off dead/self-harm past 2 weeks Not at all Not at all Not at all   Some encounter information is confidential and restricted. Go to Review Flowsheets activity to see all data.     HONEY-7 SCORE 6/13/2018 9/6/2019 8/6/2020   Total Score - - 12 (moderate anxiety)   Total Score 14 2 -   Some encounter information is confidential and restricted. Go to Review Flowsheets activity to see all data.     Referral / Collaboration:  Referral to another professional/service is not indicated at this time.  Future referral may be to treatment if alcohol use reoccurs.    Anticipated number of sessions for this episode of care: 16-20    MeasurableTreatment Goal(s) related to diagnosis / functional impairment(s)  Goal 1: Client will maintain sobriety for the next three months and will increase sober supports.    Objective #A (Client Action)    Client will maintain sobriety for three months.  Status: New - Date: 8/26/20     Intervention(s)  Therapist will assign homework for patient to reach  out to sober friends or attend support group at least 1x/week.    Objective #B  Client will identify at least 5 example(s) of how drinking has resulted in an experience that interferes with person values or goals.  Status: New - Date: 8/26/20     Intervention(s)  Therapist will collaborate w/patient in session to determine these and will discuss alternative ways to self-regulate.    Objective #C  Client will identify 5 traits or characteristics she likes about herself and will increase social interactions with sober supports (at least 1x/week).  Status: New - Date: 8/26/20     Intervention(s)  Therapist will collaborate in session and will assign homework for patient to ask loved ones how they view her.      Goal 2: Client will use her Safety Plan and will better recognize and cope with symptoms of depression.    Objective #A (Client Action)    Status: New - Date: 8/26/20     Client will create, update and use Safety Plan.    Intervention(s)  Therapist will update co-created Safety Plan as needed (pt has copy).    Objective #B  Client will identify negative self-talk and behaviors: challenge core beliefs, myths, and actions.    Status: New - Date: 8/26/20     Intervention(s)  Therapist will teach about Tato Drew's Power of Vulnerability and shame/guilt.    Objective #C  Client will decrease frequency and intensity of feeling down, depressed, hopeless.  Status: New - Date: 8/26/20     Intervention(s)  Therapist will teach assertiveness skills.        Goal 3: Client will discuss family systems and roots of her anxiety; she will then learn to better manage and challenge symptoms of anxiety.    Objective #A (Client Action)    Status: New - Date: 8/26/20     Client will increase interest, engagement, and pleasure in doing things.    Intervention(s)  Therapist will assign homework for patient to pay attention to practice emotional differentiation.    Objective #B  Client will identify three distraction and diversion  activities and use those activities to decrease level of anxiety.    Status: New - Date: 8/26/20     Intervention(s)  Therapist will teach distraction skills.      Objective #C  Client will improve concentration, focus, and mindfulness in daily activities.  Status: New - Date: 8/26/20     Intervention(s)  Therapist will teach mindfulness skills.      Patient has reviewed and agreed to the above plan.      Sherley Pratt  August 26, 2020

## 2020-09-15 ENCOUNTER — VIRTUAL VISIT (OUTPATIENT)
Dept: PSYCHOLOGY | Facility: CLINIC | Age: 50
End: 2020-09-15
Payer: COMMERCIAL

## 2020-09-15 DIAGNOSIS — F41.1 GAD (GENERALIZED ANXIETY DISORDER): Primary | ICD-10-CM

## 2020-09-15 DIAGNOSIS — F10.21 ALCOHOL USE DISORDER, MODERATE, IN EARLY REMISSION (H): ICD-10-CM

## 2020-09-15 DIAGNOSIS — F33.1 MAJOR DEPRESSIVE DISORDER, RECURRENT EPISODE, MODERATE (H): ICD-10-CM

## 2020-09-15 PROCEDURE — 90837 PSYTX W PT 60 MINUTES: CPT | Mod: 95 | Performed by: MARRIAGE & FAMILY THERAPIST

## 2020-09-15 ASSESSMENT — PATIENT HEALTH QUESTIONNAIRE - PHQ9: SUM OF ALL RESPONSES TO PHQ QUESTIONS 1-9: 10

## 2020-09-15 NOTE — PATIENT INSTRUCTIONS
Patient states that her goals for the next week include:    1. [place]  2. Prepare to winterize trailer    Patient's homework: pay attention to your inner dialogue and work on identifying and asking for what you need.  Patient was also encouraged to check out [employer] Sobriety (WFS) online groups: [email or web address]://womenforsobriety.org/

## 2020-09-15 NOTE — PROGRESS NOTES
"                                           Progress Note    Patient Name: Kesha David  Date: 9/15/2020         Service Type: Individual      Session Start Time: 7:38 a.m.  Session End Time: 8:38 a.m.     Session Length: 60 minutes    Session #: 5 (previously did DA with Chelle Plunkett, [facility], [facility])    Attendees: Client attended alone    The patient has been notified of the following:      \"We have found that certain health care needs can be provided without the need for a face to face visit.  This service lets us provide the care you need with a phone conversation.       I will have full access to your [facility] medical record during this entire phone call.   I will be taking notes for your medical record.      Since this is like an office visit, we will bill your insurance company for this service.       There are potential benefits and risks of telephone visits (e.g. limits to patient confidentiality) that differ from in-person visits.?  Confidentiality still applies for telephone services, and nobody will record the visit.  It is important to be in a quiet, private space that is free of distractions (including cell phone or other devices) during the visit.??      If during the course of the call I believe a telephone visit is not appropriate, you will not be charged for this service\"     Consent has been obtained for this service by care team member: Yes     Attempted video visit but patient was unable to get the audio to work through her bluetooth.     Treatment Plan Last Reviewed: 8/26/20  PHQ-9 / HONEY-7 : 8/6/20 HONEY-7; 9/15/2020 PHQ-9    DATA  Interactive Complexity: No  Crisis: No       Progress Since Last Session (Related to Symptoms / Goals / Homework):   Symptoms: Relapse on alcohol on Saturday 9/12/20; patient admits that she had a difficult day on Friday and was frustrated by the lack of support and help she received from coworkers, family, and friends.    Homework: Partially completed - patient " "journaled about her inner thoughts and feeling leading up to her buying alcohol; patient's son and daughter-in-law moved in with her and her  on Thursday afternoon.      Episode of Care Goals: Satisfactory progress - RELAPSE (Returned to unhealthy behavior); Intervened by reassessing readiness to change and identifying appropriate stage.  Identified reasons for relapse, successes, and change talk     Current / Ongoing Stressors and Concerns:   Relapse of drinking for one day on 9/12/2020; son and pregnant DIL are living with patient and her  for several months;  Work stress related to COVID-19 changes and being understaffed; reports viewing life \"as a poker game\" (avoids showing her cards); concerns about son and his alcohol use; history of alcoholism (two past treatments at [facility] in 2012 & 2014); has 28-year-old son; recently attempted suicide after relapse on 7/29/20 and was hospitalized; patient's  threatened divorce if she continues drinking; has stable employment; family strain stemming from childhood; was raised [employer]; has  due to past DUIs (off in 2021); childhood diagnosis of ADHD     Treatment Objective(s) Addressed in This Session:   maintain sobriety for three months  Decrease frequency and intensity of feeling down, depressed, hopeless  Identify negative self-talk and behaviors: challenge core beliefs, myths, and actions  identify at least 5 example(s) of how drinking has resulted in an experience that interferes with person values or goals  identify three distraction and diversion activities and use those activities to decrease level of anxiety    Increase interest, engagement, and pleasure in doing things    Past:  identify 5 traits or charcteristics you like about yourself: discuss others' perceptions of patient  Discussed family system issues and patterns  Attachment Theory  Improve concentration, focus, and mindfulness in daily activities   Create and use " Safety Plan       Intervention:   CBT: connect thoughts, feelings, and actions  Psychodynamic: family systems and attachment  Solution Focused: identify solvable problems and generate possible solutions  Narrative Therapy        ASSESSMENT: Current Emotional / Mental Status (status of significant symptoms):   Risk status (Self / Other harm or suicidal ideation)   Patient denies current fears or concerns for personal safety.   Patient reports the following current or recent suicidal ideation or behaviors: attempted suicide by overdosing on alcohol, SSRIs and a few pain pills on 7/29/20; denies any ideation since late July 2020.   Patient denies current or recent homicidal ideation or behaviors.   Patient denies current or recent self injurious behavior or ideation.   Patient denies other safety concerns.   Patient reports there has been no change in risk factors since their last session.     Patient reports there has been no change in protective factors since their last session.     A safety and risk management plan has been developed including: Patient consented to co-developed safety plan.  Safety and risk management plan was completed.  Patient agreed to use safety plan should any safety concerns arise.  A copy was given to the patient. Plan was developed on 8/6/20 w/EMMA Harden LADC     Appearance:   Appropriate  (briefly assessed during attempt at video visit)   Eye Contact:   Unable to assess    Psychomotor Behavior: Unable to assess    Attitude:   Cooperative  Guarded    Orientation:   All   Speech    Rate / Production: Normal/ Responsive    Volume:  Normal    Mood:    Frustrated   Affect:    Unable to assess    Thought Content:  Clear  Paranoia  Rumination    Thought Form:  Coherent  Neurosis Magical thinking   Insight:    Good  and Fair      Medication Review:   No changes to current psychiatric medication(s)     Medication Compliance:   Yes     Changes in Health Issues:   None  reported     Chemical Use Review:   Substance Use: increase in alcohol .  Patient reports frequency of use 1x on 9/12/2020.  Provided encouragement towards sobriety  referred patient to Women [facility] Sob[facility]ety groups    - will continue to monitor due to recent alcohol abuse     Tobacco Use: Yes, no change.  Patient reports frequency of use 5-6x/day. Patient declined discussion at this time    Diagnosis:  1. HONEY (generalized anxiety disorder)    2. Alcohol use disorder, moderate, in early remission (H)    3. Major depressive disorder, recurrent episode, moderate (H)        Collateral Reports Completed:   Not Applicable    PLAN: (Patient Tasks / Therapist Tasks / Other)    Patient states that her goals for the next week include:    1. Get house [place] go [place] this weekend  2. Prepare to winterize trailer    Patient's homework: pay attention to your inner dialogue and work on identifying and asking for what you need.  Patient was also encouraged to check out Women For Sobriety (WFS) online groups: https://womenforsobriety.org/       Sherley Pratt                                                    Treatment Plan    Client's Name: Kesha David  YOB: 1970    Date: 8/26/20    DSM-V Diagnoses: 296.32 (F33.1) Major Depressive Disorder, Recurrent Episode, Moderate _, 300.02 (F41.1) Generalized Anxiety Disorder or Substance-Related & Addictive Disorders Alcohol Use Disorder   303.90 (F10.20) Moderate In early remission,   Psychosocial / Contextual Factors: Concerns about son; history of alcoholism (two past treatments at [facility] in 2012 & 2014); has 28-year-old son; recently attempted suicide after relapse on 7/29/20 and was hospitalized; patient's  threatened divorce if she continues drinking; has stable employment; family strain stemming from childhood; was raised [employer]; has  due to past DUIs (off in 2021)    WHODAS 2.0 Total Score 8/6/2020   Total Score 37   Total Score  Luis Antonioclarissat 37     PHQ 8/27/2019 9/6/2019 9/15/2020   PHQ-9 Total Score 6 5 10   Q9: Thoughts of better off dead/self-harm past 2 weeks Not at all Not at all Not at all   Some encounter information is confidential and restricted. Go to Review Flowsheets activity to see all data.     HONEY-7 SCORE 6/13/2018 9/6/2019 8/6/2020   Total Score - - 12 (moderate anxiety)   Total Score 14 2 -   Some encounter information is confidential and restricted. Go to Review Flowsheets activity to see all data.     Referral / Collaboration:  Was/were discussed and client will pursue: [facility] Sob[facility]ety groups.  Future referral may be to treatment if alcohol use reoccurs.    Anticipated number of sessions for this episode of care: 16-20    MeasurableTreatment Goal(s) related to diagnosis / functional impairment(s)  Goal 1: Client will maintain sobriety for the next three months and will increase sober supports.    Objective #A (Client Action)    Client will maintain sobriety for three months.  Status: New - Date: 8/26/20     Intervention(s)  Therapist will assign homework for patient to reach out to sober friends or attend support group at least 1x/week.    Objective #B  Client will identify at least 5 example(s) of how drinking has resulted in an experience that interferes with person values or goals.  Status: New - Date: 8/26/20     Intervention(s)  Therapist will collaborate w/patient in session to determine these and will discuss alternative ways to self-regulate.    Objective #C  Client will identify 5 traits or characteristics she likes about herself and will increase social interactions with sober supports (at least 1x/week).  Status: New - Date: 8/26/20     Intervention(s)  Therapist will collaborate in session and will assign homework for patient to ask loved ones how they view her.      Goal 2: Client will use her Safety Plan and will better recognize and cope with symptoms of depression.    Objective #A (Client Action)    Status:  New - Date: 8/26/20     Client will create, update and use Safety Plan.    Intervention(s)  Therapist will update co-created Safety Plan as needed (pt has copy).    Objective #B  Client will identify negative self-talk and behaviors: challenge core beliefs, myths, and actions.    Status: New - Date: 8/26/20     Intervention(s)  Therapist will teach about Tato Drew's Power of Vulnerability and shame/guilt.    Objective #C  Client will decrease frequency and intensity of feeling down, depressed, hopeless.  Status: New - Date: 8/26/20     Intervention(s)  Therapist will teach assertiveness skills.        Goal 3: Client will discuss family systems and roots of her anxiety; she will then learn to better manage and challenge symptoms of anxiety.    Objective #A (Client Action)    Status: New - Date: 8/26/20     Client will increase interest, engagement, and pleasure in doing things.    Intervention(s)  Therapist will assign homework for patient to pay attention to practice emotional differentiation.    Objective #B  Client will identify three distraction and diversion activities and use those activities to decrease level of anxiety.    Status: New - Date: 8/26/20     Intervention(s)  Therapist will teach distraction skills.      Objective #C  Client will improve concentration, focus, and mindfulness in daily activities.  Status: New - Date: 8/26/20     Intervention(s)  Therapist will teach mindfulness skills.      Patient has reviewed and agreed to the above plan.      Sherley Pratt  August 26, 2020

## 2020-09-22 ENCOUNTER — VIRTUAL VISIT (OUTPATIENT)
Dept: PSYCHOLOGY | Facility: CLINIC | Age: 50
End: 2020-09-22
Payer: COMMERCIAL

## 2020-09-22 DIAGNOSIS — F33.1 MAJOR DEPRESSIVE DISORDER, RECURRENT EPISODE, MODERATE (H): ICD-10-CM

## 2020-09-22 DIAGNOSIS — F10.21 ALCOHOL USE DISORDER, MODERATE, IN EARLY REMISSION (H): ICD-10-CM

## 2020-09-22 DIAGNOSIS — F41.1 GAD (GENERALIZED ANXIETY DISORDER): Primary | ICD-10-CM

## 2020-09-22 PROCEDURE — 90837 PSYTX W PT 60 MINUTES: CPT | Mod: 95 | Performed by: MARRIAGE & FAMILY THERAPIST

## 2020-09-22 NOTE — PATIENT INSTRUCTIONS
Patient states that her goals for the next week include:    1. Attend virtual appointment w/doctor and ask about transitioning medications to be prescribed by PCP  2. Continue mindful eating and check if there is a link between naltrexone helping with food addiction

## 2020-09-22 NOTE — PROGRESS NOTES
"                                           Progress Note    Patient Name: Kesha David  Date: 9/22/2020         Service Type: Individual      Session Start Time: 7:33 a.m.  Session End Time: 8:27 a.m.     Session Length: 54 minutes    Session #: 6 (previously did DA with Chelle Plunkett, [facility], [facility])    Attendees: Client attended alone    Telemedicine Visit: The patient's condition can be safely assessed and treated via synchronous audio and visual telemedicine encounter.      Reason for Telemedicine Visit: Services only offered telehealth    Originating Site (Patient Location): Patient's home    Distant Site (Provider Location): [facility]: [place]    Consent:  The patient/guardian has verbally consented to: the potential risks and benefits of telemedicine (video visit) versus in person care; bill my insurance or make self-payment for services provided; and responsibility for payment of non-covered services.     Mode of Communication:  Video Conference via [employer]    As the provider I attest to compliance with applicable laws and regulations related to telemedicine.     Treatment Plan Last Reviewed: 8/26/20  PHQ-9 / HONEY-7 : 8/6/20 HONEY-7; 9/15/2020 PHQ-9    DATA  Interactive Complexity: No  Crisis: No       Progress Since Last Session (Related to Symptoms / Goals / Homework):   Symptoms: Improving as patient has not had alcohol and enjoyed a nature weekend with her sister-in-law.    Homework: Achieved / completed to satisfaction - patient went on her trip and winterized her camper.      Episode of Care Goals: Satisfactory progress - ACTION (Actively working towards change); Intervened by reinforcing change plan / affirming steps taken     Current / Ongoing Stressors and Concerns:   Relapse of drinking for one day on 9/12/2020; son and pregnant DIL are living with patient and her  for several months;  Work stress related to COVID-19 changes and being understaffed; reports viewing life \"as " "a poker game\" (avoids showing her cards); concerns about son and his alcohol use; history of alcoholism (two past treatments at [facility] in 2012 & 2014); has 28-year-old son; recently attempted suicide after relapse on 7/29/20 and was hospitalized; patient's  threatened divorce if she continues drinking; has stable employment; family strain stemming from childhood; was raised [employer]; has  due to past DUIs (off in 2021); childhood diagnosis of ADHD     Treatment Objective(s) Addressed in This Session:   maintain sobriety for three months  Decrease frequency and intensity of feeling down, depressed, hopeless  identify three distraction and diversion activities and use those activities to decrease level of anxiety    Increase interest, engagement, and pleasure in doing things  Boundaries w/coworkers    Past:  Identify negative self-talk and behaviors: challenge core beliefs, myths, and actions  identify at least 5 example(s) of how drinking has resulted in an experience that interferes with person values or goals  identify 5 traits or charcteristics you like about yourself: discuss others' perceptions of patient  Discussed family system issues and patterns  Attachment Theory  Improve concentration, focus, and mindfulness in daily activities   Create and use Safety Plan       Intervention:   CBT: connect thoughts, feelings, and actions  Psychodynamic: family systems and attachment  Solution Focused: identify solvable problems and generate possible solutions  Narrative Therapy        ASSESSMENT: Current Emotional / Mental Status (status of significant symptoms):   Risk status (Self / Other harm or suicidal ideation)   Patient denies current fears or concerns for personal safety.   Patient reports the following current or recent suicidal ideation or behaviors: attempted suicide by overdosing on alcohol, SSRIs and a few pain pills on 7/29/20; denies any ideation since late July 2020.   Patient " denies current or recent homicidal ideation or behaviors.   Patient denies current or recent self injurious behavior or ideation.   Patient denies other safety concerns.   Patient reports there has been no change in risk factors since their last session.     Patient reports there has been no change in protective factors since their last session.     A safety and risk management plan has been developed including: Patient consented to co-developed safety plan.  Safety and risk management plan was completed.  Patient agreed to use safety plan should any safety concerns arise.  A copy was given to the patient. Plan was developed on 8/6/20 w/EMMA Harden, PAT     Appearance:   Appropriate    Eye Contact:   Good    Psychomotor Behavior: Normal    Attitude:   Cooperative  Playful Guarded    Orientation:   All   Speech    Rate / Production: Normal/ Responsive    Volume:  Normal    Mood:    Elevated  Expansive   Affect:    Appropriate  Bright  Expansive    Thought Content:  Clear  Paranoia  Rumination    Thought Form:  Coherent  Neurosis Magical thinking   Insight:    Good  and Fair      Medication Review:   No changes to current psychiatric medication(s)     Medication Compliance:   Yes     Changes in Health Issues:   None reported     Chemical Use Review:   Substance Use: increase in alcohol .  Patient reports frequency of use N/A.  Provided encouragement towards sobriety  Provided support and affirmation for steps taken towards sobriety   referred patient to Women [facility] Sobriety groups    - will continue to monitor due to recent alcohol abuse     Tobacco Use: Yes, no change.  Patient reports frequency of use 5-6x/day. Patient declined discussion at this time    Diagnosis:  1. HONEY (generalized anxiety disorder)    2. Major depressive disorder, recurrent episode, moderate (H)    3. Alcohol use disorder, moderate, in early remission (H)        Collateral Reports Completed:   Not Applicable    PLAN: (Patient Tasks /  Therapist Tasks / Other)    Patient states that her goals for the next week include:    1. Attend virtual appointment w/doctor and ask about transitioning medications to be prescribed by PCP  2. Continue mindful eating and check if there is a link between naltrexone helping with food addiction        Sherley Pratt                                                    Treatment Plan    Client's Name: Kesha David  YOB: 1970    Date: 8/26/20    DSM-V Diagnoses: 296.32 (F33.1) Major Depressive Disorder, Recurrent Episode, Moderate _, 300.02 (F41.1) Generalized Anxiety Disorder or Substance-Related & Addictive Disorders Alcohol Use Disorder   303.90 (F10.20) Moderate In early remission,   Psychosocial / Contextual Factors: Concerns about son; history of alcoholism (two past treatments at [facility] in 2012 & 2014); has 28-year-old son; recently attempted suicide after relapse on 7/29/20 and was hospitalized; patient's  threatened divorce if she continues drinking; has stable employment; family strain stemming from childhood; was raised [employer]; has  due to past DUIs (off in 2021)    WHODAS 2.0 Total Score 8/6/2020   Total Score 37   Total Score MyChart 37     PHQ 8/27/2019 9/6/2019 9/15/2020   PHQ-9 Total Score 6 5 10   Q9: Thoughts of better off dead/self-harm past 2 weeks Not at all Not at all Not at all   Some encounter information is confidential and restricted. Go to Review Flowsheets activity to see all data.     HONEY-7 SCORE 6/13/2018 9/6/2019 8/6/2020   Total Score - - 12 (moderate anxiety)   Total Score 14 2 -   Some encounter information is confidential and restricted. Go to Review Flowsheets activity to see all data.     Referral / Collaboration:  Was/were discussed and client will pursue: [facility] Sob[facility]ety groups.  Future referral may be to treatment if alcohol use reoccurs.    Anticipated number of sessions for this episode of care: 16-20    MeasurableTreatment  Goal(s) related to diagnosis / functional impairment(s)  Goal 1: Client will maintain sobriety for the next three months and will increase sober supports.    Objective #A (Client Action)    Client will maintain sobriety for three months.  Status: New - Date: 8/26/20     Intervention(s)  Therapist will assign homework for patient to reach out to sober friends or attend support group at least 1x/week.    Objective #B  Client will identify at least 5 example(s) of how drinking has resulted in an experience that interferes with person values or goals.  Status: New - Date: 8/26/20     Intervention(s)  Therapist will collaborate w/patient in session to determine these and will discuss alternative ways to self-regulate.    Objective #C  Client will identify 5 traits or characteristics she likes about herself and will increase social interactions with sober supports (at least 1x/week).  Status: New - Date: 8/26/20     Intervention(s)  Therapist will collaborate in session and will assign homework for patient to ask loved ones how they view her.      Goal 2: Client will use her Safety Plan and will better recognize and cope with symptoms of depression.    Objective #A (Client Action)    Status: New - Date: 8/26/20     Client will create, update and use Safety Plan.    Intervention(s)  Therapist will update co-created Safety Plan as needed (pt has copy).    Objective #B  Client will identify negative self-talk and behaviors: challenge core beliefs, myths, and actions.    Status: New - Date: 8/26/20     Intervention(s)  Therapist will teach about Tato Drew's Power of Vulnerability and shame/guilt.    Objective #C  Client will decrease frequency and intensity of feeling down, depressed, hopeless.  Status: New - Date: 8/26/20     Intervention(s)  Therapist will teach assertiveness skills.        Goal 3: Client will discuss family systems and roots of her anxiety; she will then learn to better manage and challenge symptoms of  anxiety.    Objective #A (Client Action)    Status: New - Date: 8/26/20     Client will increase interest, engagement, and pleasure in doing things.    Intervention(s)  Therapist will assign homework for patient to pay attention to practice emotional differentiation.    Objective #B  Client will identify three distraction and diversion activities and use those activities to decrease level of anxiety.    Status: New - Date: 8/26/20     Intervention(s)  Therapist will teach distraction skills.      Objective #C  Client will improve concentration, focus, and mindfulness in daily activities.  Status: New - Date: 8/26/20     Intervention(s)  Therapist will teach mindfulness skills.      Patient has reviewed and agreed to the above plan.      Sherley Pratt  August 26, 2020

## 2020-09-29 ENCOUNTER — VIRTUAL VISIT (OUTPATIENT)
Dept: PSYCHOLOGY | Facility: CLINIC | Age: 50
End: 2020-09-29
Payer: COMMERCIAL

## 2020-09-29 DIAGNOSIS — F41.1 GAD (GENERALIZED ANXIETY DISORDER): Primary | ICD-10-CM

## 2020-09-29 DIAGNOSIS — F33.1 MAJOR DEPRESSIVE DISORDER, RECURRENT EPISODE, MODERATE (H): ICD-10-CM

## 2020-09-29 PROCEDURE — 90837 PSYTX W PT 60 MINUTES: CPT | Mod: 95 | Performed by: MARRIAGE & FAMILY THERAPIST

## 2020-09-29 NOTE — PROGRESS NOTES
Progress Note    Patient Name: Kesha David  Date: 9/29/2020         Service Type: Individual      Session Start Time: 7:35 a.m.  Session End Time: 8:35 a.m.     Session Length: 60 minutes    Session #: 7 (previously did DA with Chelle Plunkett, [facility], [facility])    Attendees: Client attended alone    Telemedicine Visit: The patient's condition can be safely assessed and treated via synchronous audio and visual telemedicine encounter.      Reason for Telemedicine Visit: Services only offered telehealth    Originating Site (Patient Location): Patient's home    Distant Site (Provider Location): [facility] Clinics: [place]    Consent:  The patient/guardian has verbally consented to: the potential risks and benefits of telemedicine (video visit) versus in person care; bill my insurance or make self-payment for services provided; and responsibility for payment of non-covered services.     Mode of Communication:  Video Conference via [employer]    As the provider I attest to compliance with applicable laws and regulations related to telemedicine.     Treatment Plan Last Reviewed: 8/26/20  PHQ-9 / HONEY-7 : 8/6/20 HONEY-7; 9/15/2020 PHQ-9    DATA  Interactive Complexity: No  Crisis: No       Progress Since Last Session (Related to Symptoms / Goals / Homework):   Symptoms: Improving as patient did not drink during the past weekend and photographed her niece and fall colors; patient states that she has not been drinking and has been thinking more about her behavior patterns.    Homework: Achieved / completed to satisfaction - patient attended her PCP appointment.      Episode of Care Goals: Satisfactory progress - ACTION (Actively working towards change); Intervened by reinforcing change plan / affirming steps taken     Current / Ongoing Stressors and Concerns:   Relapse of drinking for one day on 9/12/2020; son and pregnant DIL are living with patient and her  for several  "months;  Work stress related to COVID-19 changes and being understaffed; reports viewing life \"as a poker game\" (avoids showing her cards); concerns about son and his alcohol use; history of alcoholism (two past treatments at [facility] in 2012 & 2014); has 28-year-old son; recently attempted suicide after relapse on 7/29/20 and was hospitalized; patient's  threatened divorce if she continues drinking; has stable employment; family strain stemming from childhood; was raised [employer]; has  due to past DUIs (off in 2021); childhood diagnosis of ADHD     Treatment Objective(s) Addressed in This Session:   Discussed family system issues and patterns  Identify negative self-talk and behaviors: challenge core beliefs, myths, and actions  Decrease frequency and intensity of feeling down, depressed, hopeless  identify three distraction and diversion activities and use those activities to decrease level of anxiety    Increase interest, engagement, and pleasure in doing things  Boundaries w/coworkers    Past:  maintain sobriety for three months  identify at least 5 example(s) of how drinking has resulted in an experience that interferes with person values or goals  identify 5 traits or charcteristics you like about yourself: discuss others' perceptions of patient  Attachment Theory  Improve concentration, focus, and mindfulness in daily activities   Create and use Safety Plan       Intervention:   CBT: connect thoughts, feelings, and actions  Psychodynamic: family systems and attachment  Solution Focused: identify solvable problems and generate possible solutions  Narrative Therapy        ASSESSMENT: Current Emotional / Mental Status (status of significant symptoms):   Risk status (Self / Other harm or suicidal ideation)   Patient denies current fears or concerns for personal safety.   Patient reports the following current or recent suicidal ideation or behaviors: attempted suicide by overdosing on " alcohol, SSRIs and a few pain pills on 7/29/20; denies any ideation since late July 2020.   Patient denies current or recent homicidal ideation or behaviors.   Patient denies current or recent self injurious behavior or ideation.   Patient denies other safety concerns.   Patient reports there has been no change in risk factors since their last session.     Patient reports there has been no change in protective factors since their last session.     A safety and risk management plan has been developed including: Patient consented to co-developed safety plan.  Safety and risk management plan was completed.  Patient agreed to use safety plan should any safety concerns arise.  A copy was given to the patient. Plan was developed on 8/6/20 w/EMMA Harden, PAT     Appearance:   Appropriate    Eye Contact:   Good    Psychomotor Behavior: Normal    Attitude:   Cooperative  Playful Friendly Geovani   Orientation:   All   Speech    Rate / Production: Normal/ Responsive    Volume:  Normal    Mood:    Elevated  Expansive   Affect:    Appropriate  Bright  Expansive    Thought Content:  Clear  Paranoia  Rumination    Thought Form:  Coherent  Neurosis Magical thinking   Insight:    Good  and Fair      Medication Review:   No changes to current psychiatric medication(s)     Medication Compliance:   Yes     Changes in Health Issues:   None reported     Chemical Use Review:   Substance Use: decrease in alcohol .  Patient reports frequency of use = 0.  Provided encouragement towards sobriety  Provided support and affirmation for steps taken towards sobriety   referred patient to [facility] groups    - will continue to monitor due to recent alcohol abuse     Tobacco Use: Yes, no change.  Patient reports frequency of use 5-6x/day. Patient declined discussion at this time    Diagnosis:  1. HONEY (generalized anxiety disorder)    2. Major depressive disorder, recurrent episode, moderate (H)        Collateral Reports  Completed:   Communicated with: patient to confirm appointment attendace with     PLAN: (Patient Tasks / Therapist Tasks / Other)    Patient states that her goals for the next week include:    1. Continue practicing vulnerability to develop friendships with other women  2. Complete homework: track/record when someone's negative energy affects her (will discuss at next session)      Sherley Pratt                                                    Treatment Plan    Client's Name: Kesha David  YOB: 1970    Date: 8/26/20    DSM-V Diagnoses: 296.32 (F33.1) Major Depressive Disorder, Recurrent Episode, Moderate _, 300.02 (F41.1) Generalized Anxiety Disorder or Substance-Related & Addictive Disorders Alcohol Use Disorder   303.90 (F10.20) Moderate In early remission,   Psychosocial / Contextual Factors: Concerns about son; history of alcoholism (two past treatments at [facility] in 2012 & 2014); has 28-year-old son; recently attempted suicide after relapse on 7/29/20 and was hospitalized; patient's  threatened divorce if she continues drinking; has stable employment; family strain stemming from childhood; was raised [employer]; has  due to past DUIs (off in 2021)    WHODAS 2.0 Total Score 8/6/2020   Total Score 37   Total Score MyChart 37     PHQ 8/27/2019 9/6/2019 9/15/2020   PHQ-9 Total Score 6 5 10   Q9: Thoughts of better off dead/self-harm past 2 weeks Not at all Not at all Not at all   Some encounter information is confidential and restricted. Go to Review Flowsheets activity to see all data.     HONEY-7 SCORE 6/13/2018 9/6/2019 8/6/2020   Total Score - - 12 (moderate anxiety)   Total Score 14 2 -   Some encounter information is confidential and restricted. Go to Review Flowsheets activity to see all data.     Referral / Collaboration:  Was/were discussed and client will pursue: [facility] groups.  Future referral may be to treatment if alcohol use  reoccurs.    Anticipated number of sessions for this episode of care: 16-20    MeasurableTreatment Goal(s) related to diagnosis / functional impairment(s)  Goal 1: Client will maintain sobriety for the next three months and will increase sober supports.    Objective #A (Client Action)    Client will maintain sobriety for three months.  Status: New - Date: 8/26/20     Intervention(s)  Therapist will assign homework for patient to reach out to sober friends or attend support group at least 1x/week.    Objective #B  Client will identify at least 5 example(s) of how drinking has resulted in an experience that interferes with person values or goals.  Status: New - Date: 8/26/20     Intervention(s)  Therapist will collaborate w/patient in session to determine these and will discuss alternative ways to self-regulate.    Objective #C  Client will identify 5 traits or characteristics she likes about herself and will increase social interactions with sober supports (at least 1x/week).  Status: New - Date: 8/26/20     Intervention(s)  Therapist will collaborate in session and will assign homework for patient to ask loved ones how they view her.      Goal 2: Client will use her Safety Plan and will better recognize and cope with symptoms of depression.    Objective #A (Client Action)    Status: New - Date: 8/26/20     Client will create, update and use Safety Plan.    Intervention(s)  Therapist will update co-created Safety Plan as needed (pt has copy).    Objective #B  Client will identify negative self-talk and behaviors: challenge core beliefs, myths, and actions.    Status: New - Date: 8/26/20     Intervention(s)  Therapist will teach about Tato Drew's Power of Vulnerability and shame/guilt.    Objective #C  Client will decrease frequency and intensity of feeling down, depressed, hopeless.  Status: New - Date: 8/26/20     Intervention(s)  Therapist will teach assertiveness skills.        Goal 3: Client will discuss family  systems and roots of her anxiety; she will then learn to better manage and challenge symptoms of anxiety.    Objective #A (Client Action)    Status: New - Date: 8/26/20     Client will increase interest, engagement, and pleasure in doing things.    Intervention(s)  Therapist will assign homework for patient to pay attention to practice emotional differentiation.    Objective #B  Client will identify three distraction and diversion activities and use those activities to decrease level of anxiety.    Status: New - Date: 8/26/20     Intervention(s)  Therapist will teach distraction skills.      Objective #C  Client will improve concentration, focus, and mindfulness in daily activities.  Status: New - Date: 8/26/20     Intervention(s)  Therapist will teach mindfulness skills.      Patient has reviewed and agreed to the above plan.      Sherley Pratt  August 26, 2020

## 2020-09-29 NOTE — PATIENT INSTRUCTIONS
Patient states that her goals for the next week include:    1. Continue practicing vulnerability to develop friendships with other women  2. Complete homework: track/record when someone's negative energy affects her (will discuss at next session)

## 2020-10-06 ENCOUNTER — VIRTUAL VISIT (OUTPATIENT)
Dept: PSYCHOLOGY | Facility: CLINIC | Age: 50
End: 2020-10-06
Payer: COMMERCIAL

## 2020-10-06 DIAGNOSIS — F10.21 ALCOHOL USE DISORDER, MODERATE, IN EARLY REMISSION (H): ICD-10-CM

## 2020-10-06 DIAGNOSIS — F33.1 MAJOR DEPRESSIVE DISORDER, RECURRENT EPISODE, MODERATE (H): Primary | ICD-10-CM

## 2020-10-06 DIAGNOSIS — F41.1 GAD (GENERALIZED ANXIETY DISORDER): ICD-10-CM

## 2020-10-06 PROCEDURE — 90837 PSYTX W PT 60 MINUTES: CPT | Mod: 95 | Performed by: MARRIAGE & FAMILY THERAPIST

## 2020-10-06 NOTE — PATIENT INSTRUCTIONS
Patient states that her goals for the next two weeks include:    1. Put energy towards work  2. Pack and prepare for upcoming trip to [place]  3. Check out Enneagram assessment    Patient was encouraged to journal at least 1-2 times during next two weeks.
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
## 2020-10-20 ENCOUNTER — VIRTUAL VISIT (OUTPATIENT)
Dept: PSYCHOLOGY | Facility: CLINIC | Age: 50
End: 2020-10-20
Payer: COMMERCIAL

## 2020-10-20 DIAGNOSIS — F33.1 MAJOR DEPRESSIVE DISORDER, RECURRENT EPISODE, MODERATE (H): Primary | ICD-10-CM

## 2020-10-20 DIAGNOSIS — F41.1 GAD (GENERALIZED ANXIETY DISORDER): ICD-10-CM

## 2020-10-20 DIAGNOSIS — F10.21 ALCOHOL USE DISORDER, MODERATE, IN EARLY REMISSION (H): ICD-10-CM

## 2020-10-20 PROCEDURE — 90834 PSYTX W PT 45 MINUTES: CPT | Mod: 95 | Performed by: MARRIAGE & FAMILY THERAPIST

## 2020-10-20 NOTE — PATIENT INSTRUCTIONS
Patient states that her goals for the next week include:    1. Journal and/or think more about what makes pt happy vs. pt constantly providing happiness for others  2. Check out Enneagram assessment
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
## 2020-10-27 ENCOUNTER — VIRTUAL VISIT (OUTPATIENT)
Dept: PSYCHOLOGY | Facility: CLINIC | Age: 50
End: 2020-10-27
Payer: COMMERCIAL

## 2020-10-27 DIAGNOSIS — F10.21 ALCOHOL USE DISORDER, MODERATE, IN EARLY REMISSION (H): ICD-10-CM

## 2020-10-27 DIAGNOSIS — F33.1 MAJOR DEPRESSIVE DISORDER, RECURRENT EPISODE, MODERATE (H): Primary | ICD-10-CM

## 2020-10-27 DIAGNOSIS — F41.1 GAD (GENERALIZED ANXIETY DISORDER): ICD-10-CM

## 2020-10-27 PROCEDURE — 90837 PSYTX W PT 60 MINUTES: CPT | Mod: 95 | Performed by: MARRIAGE & FAMILY THERAPIST

## 2020-10-27 NOTE — PATIENT INSTRUCTIONS
Patient states that her goals for the next week include:    1. Run errands; clean up house; change sheets  2. Go to work; be successful while at work  3. Continue to radha
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
## 2020-11-03 ENCOUNTER — VIRTUAL VISIT (OUTPATIENT)
Dept: PSYCHOLOGY | Facility: CLINIC | Age: 50
End: 2020-11-03
Payer: COMMERCIAL

## 2020-11-03 DIAGNOSIS — F33.1 MAJOR DEPRESSIVE DISORDER, RECURRENT EPISODE, MODERATE (H): Primary | ICD-10-CM

## 2020-11-03 DIAGNOSIS — F41.1 GAD (GENERALIZED ANXIETY DISORDER): ICD-10-CM

## 2020-11-03 DIAGNOSIS — F10.21 ALCOHOL USE DISORDER, MODERATE, IN EARLY REMISSION (H): ICD-10-CM

## 2020-11-03 PROCEDURE — 90837 PSYTX W PT 60 MINUTES: CPT | Mod: 95 | Performed by: MARRIAGE & FAMILY THERAPIST

## 2020-11-03 NOTE — PATIENT INSTRUCTIONS
Patient states that her goals for the next two weeks include:    1. Be kind to self while adjusting to Daylight Savings Time  2. Go shopping with daughter-in-law for her maternity clothes
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
## 2020-11-11 ENCOUNTER — TELEPHONE (OUTPATIENT)
Dept: FAMILY MEDICINE | Facility: OTHER | Age: 50
End: 2020-11-11

## 2020-11-11 RX ORDER — DULOXETIN HYDROCHLORIDE 60 MG/1
60 CAPSULE, DELAYED RELEASE ORAL
Status: CANCELLED | OUTPATIENT
Start: 2020-11-11

## 2020-11-11 RX ORDER — DULOXETIN HYDROCHLORIDE 60 MG/1
60 CAPSULE, DELAYED RELEASE ORAL
COMMUNITY
Start: 2020-09-22 | End: 2021-04-15

## 2020-11-11 NOTE — TELEPHONE ENCOUNTER
Patient has been getting this from a provider outside of [facility].

## 2020-11-11 NOTE — TELEPHONE ENCOUNTER
Last seen by me in 2018.      No.      He should follow-up with whoever prescribes his medication or set up an appointment to reestablish care.      Shereen

## 2020-11-11 NOTE — TELEPHONE ENCOUNTER
Patient wants new Rx for Cymbalta 60 mg last filled 9- # 30 ds 30  Patient is out of meds. Please let pharmacy know asap if approved / denied. Thanks    Joao Leon  [facility]  On Behalf of [facility]
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
## 2020-11-13 ENCOUNTER — OFFICE VISIT (OUTPATIENT)
Dept: FAMILY MEDICINE | Facility: OTHER | Age: 50
End: 2020-11-13
Payer: COMMERCIAL

## 2020-11-13 VITALS
WEIGHT: 225 LBS | OXYGEN SATURATION: 99 % | DIASTOLIC BLOOD PRESSURE: 88 MMHG | HEART RATE: 62 BPM | BODY MASS INDEX: 35.31 KG/M2 | HEIGHT: 67 IN | RESPIRATION RATE: 16 BRPM | SYSTOLIC BLOOD PRESSURE: 122 MMHG | TEMPERATURE: 96.4 F

## 2020-11-13 DIAGNOSIS — F41.1 ANXIETY STATE: ICD-10-CM

## 2020-11-13 DIAGNOSIS — K21.00 GASTROESOPHAGEAL REFLUX DISEASE WITH ESOPHAGITIS WITHOUT HEMORRHAGE: ICD-10-CM

## 2020-11-13 DIAGNOSIS — J45.30 MILD PERSISTENT ASTHMA WITHOUT COMPLICATION: ICD-10-CM

## 2020-11-13 DIAGNOSIS — Z00.00 ROUTINE GENERAL MEDICAL EXAMINATION AT A HEALTH CARE FACILITY: Primary | ICD-10-CM

## 2020-11-13 DIAGNOSIS — E66.9 OBESITY (BMI 35.0-39.9 WITHOUT COMORBIDITY): ICD-10-CM

## 2020-11-13 DIAGNOSIS — Z12.11 SCREEN FOR COLON CANCER: ICD-10-CM

## 2020-11-13 DIAGNOSIS — I10 HYPERTENSION GOAL BP (BLOOD PRESSURE) < 140/90: ICD-10-CM

## 2020-11-13 DIAGNOSIS — A42.89 CUTANEOUS ACTINOMYCOTIC INFECTION: ICD-10-CM

## 2020-11-13 DIAGNOSIS — N95.1 MENOPAUSAL SYNDROME (HOT FLASHES): ICD-10-CM

## 2020-11-13 DIAGNOSIS — Z12.31 ENCOUNTER FOR SCREENING MAMMOGRAM FOR MALIGNANT NEOPLASM OF BREAST: ICD-10-CM

## 2020-11-13 DIAGNOSIS — Z11.59 NEED FOR HEPATITIS C SCREENING TEST: ICD-10-CM

## 2020-11-13 DIAGNOSIS — L02.92 BOIL: ICD-10-CM

## 2020-11-13 DIAGNOSIS — E66.01 MORBID OBESITY (H): ICD-10-CM

## 2020-11-13 DIAGNOSIS — F33.0 MAJOR DEPRESSIVE DISORDER, RECURRENT EPISODE, MILD (H): ICD-10-CM

## 2020-11-13 LAB
ALBUMIN SERPL-MCNC: 3.9 G/DL (ref 3.4–5)
ALP SERPL-CCNC: 129 U/L (ref 40–150)
ALT SERPL W P-5'-P-CCNC: 30 U/L (ref 0–50)
ANION GAP SERPL CALCULATED.3IONS-SCNC: 8 MMOL/L (ref 3–14)
AST SERPL W P-5'-P-CCNC: 13 U/L (ref 0–45)
BILIRUB SERPL-MCNC: 0.4 MG/DL (ref 0.2–1.3)
BUN SERPL-MCNC: 15 MG/DL (ref 7–30)
CALCIUM SERPL-MCNC: 9.1 MG/DL (ref 8.5–10.1)
CHLORIDE SERPL-SCNC: 108 MMOL/L (ref 94–109)
CHOLEST SERPL-MCNC: 218 MG/DL
CO2 SERPL-SCNC: 25 MMOL/L (ref 20–32)
CREAT SERPL-MCNC: 0.78 MG/DL (ref 0.52–1.04)
ERYTHROCYTE [DISTWIDTH] IN BLOOD BY AUTOMATED COUNT: 13.7 % (ref 10–15)
GFR SERPL CREATININE-BSD FRML MDRD: 88 ML/MIN/{1.73_M2}
GLUCOSE SERPL-MCNC: 85 MG/DL (ref 70–99)
HBA1C MFR BLD: 4.9 % (ref 0–5.6)
HCT VFR BLD AUTO: 41 % (ref 35–47)
HDLC SERPL-MCNC: 69 MG/DL
HGB BLD-MCNC: 14.3 G/DL (ref 11.7–15.7)
LDLC SERPL CALC-MCNC: 114 MG/DL
MCH RBC QN AUTO: 31 PG (ref 26.5–33)
MCHC RBC AUTO-ENTMCNC: 34.9 G/DL (ref 31.5–36.5)
MCV RBC AUTO: 89 FL (ref 78–100)
NONHDLC SERPL-MCNC: 149 MG/DL
PLATELET # BLD AUTO: 247 10E9/L (ref 150–450)
POTASSIUM SERPL-SCNC: 3.9 MMOL/L (ref 3.4–5.3)
PROT SERPL-MCNC: 7.9 G/DL (ref 6.8–8.8)
RBC # BLD AUTO: 4.62 10E12/L (ref 3.8–5.2)
SODIUM SERPL-SCNC: 141 MMOL/L (ref 133–144)
T3 SERPL-MCNC: 122 NG/DL (ref 60–181)
T3FREE SERPL-MCNC: 3.1 PG/ML (ref 2.3–4.2)
TRIGL SERPL-MCNC: 173 MG/DL
TSH SERPL DL<=0.005 MIU/L-ACNC: 1.9 MU/L (ref 0.4–4)
WBC # BLD AUTO: 7 10E9/L (ref 4–11)

## 2020-11-13 PROCEDURE — 84480 ASSAY TRIIODOTHYRONINE (T3): CPT | Mod: 59 | Performed by: PHYSICIAN ASSISTANT

## 2020-11-13 PROCEDURE — 83036 HEMOGLOBIN GLYCOSYLATED A1C: CPT | Performed by: PHYSICIAN ASSISTANT

## 2020-11-13 PROCEDURE — 80053 COMPREHEN METABOLIC PANEL: CPT | Performed by: PHYSICIAN ASSISTANT

## 2020-11-13 PROCEDURE — 36415 COLL VENOUS BLD VENIPUNCTURE: CPT | Performed by: PHYSICIAN ASSISTANT

## 2020-11-13 PROCEDURE — 84481 FREE ASSAY (FT-3): CPT | Performed by: PHYSICIAN ASSISTANT

## 2020-11-13 PROCEDURE — 99396 PREV VISIT EST AGE 40-64: CPT | Performed by: PHYSICIAN ASSISTANT

## 2020-11-13 PROCEDURE — 85027 COMPLETE CBC AUTOMATED: CPT | Performed by: PHYSICIAN ASSISTANT

## 2020-11-13 PROCEDURE — 84443 ASSAY THYROID STIM HORMONE: CPT | Performed by: PHYSICIAN ASSISTANT

## 2020-11-13 PROCEDURE — 99213 OFFICE O/P EST LOW 20 MIN: CPT | Mod: 25 | Performed by: PHYSICIAN ASSISTANT

## 2020-11-13 PROCEDURE — 80061 LIPID PANEL: CPT | Performed by: PHYSICIAN ASSISTANT

## 2020-11-13 RX ORDER — MULTIVITAMIN WITH IRON
2 TABLET ORAL DAILY
COMMUNITY
End: 2024-05-14

## 2020-11-13 RX ORDER — MUPIROCIN 20 MG/G
OINTMENT TOPICAL 3 TIMES DAILY
Qty: 30 G | Refills: 1 | Status: SHIPPED | OUTPATIENT
Start: 2020-11-13 | End: 2021-11-15

## 2020-11-13 RX ORDER — SULFAMETHOXAZOLE/TRIMETHOPRIM 800-160 MG
1 TABLET ORAL 2 TIMES DAILY
Qty: 20 TABLET | Refills: 0 | Status: SHIPPED | OUTPATIENT
Start: 2020-11-13 | End: 2021-02-18

## 2020-11-13 RX ORDER — NALTREXONE HYDROCHLORIDE 50 MG/1
TABLET, FILM COATED ORAL
COMMUNITY
Start: 2020-10-27 | End: 2021-02-15

## 2020-11-13 ASSESSMENT — ANXIETY QUESTIONNAIRES
7. FEELING AFRAID AS IF SOMETHING AWFUL MIGHT HAPPEN: NOT AT ALL
GAD7 TOTAL SCORE: 0
2. NOT BEING ABLE TO STOP OR CONTROL WORRYING: NOT AT ALL
1. FEELING NERVOUS, ANXIOUS, OR ON EDGE: NOT AT ALL
GAD7 TOTAL SCORE: 0
GAD7 TOTAL SCORE: 0
3. WORRYING TOO MUCH ABOUT DIFFERENT THINGS: NOT AT ALL
7. FEELING AFRAID AS IF SOMETHING AWFUL MIGHT HAPPEN: NOT AT ALL
5. BEING SO RESTLESS THAT IT IS HARD TO SIT STILL: NOT AT ALL
6. BECOMING EASILY ANNOYED OR IRRITABLE: NOT AT ALL
4. TROUBLE RELAXING: NOT AT ALL

## 2020-11-13 ASSESSMENT — ENCOUNTER SYMPTOMS
NAUSEA: 0
ABDOMINAL PAIN: 0
COUGH: 0
JOINT SWELLING: 1
HEMATURIA: 0
EYE PAIN: 0
CHILLS: 0
SHORTNESS OF BREATH: 0
HEMATOCHEZIA: 0
SORE THROAT: 0
CONSTIPATION: 0
DIZZINESS: 0
ARTHRALGIAS: 1
HEARTBURN: 1
HEADACHES: 0
FREQUENCY: 0
DIARRHEA: 1
BREAST MASS: 0
FEVER: 0
NERVOUS/ANXIOUS: 0
DYSURIA: 0
PARESTHESIAS: 0
WEAKNESS: 1
MYALGIAS: 1
PALPITATIONS: 0

## 2020-11-13 ASSESSMENT — PATIENT HEALTH QUESTIONNAIRE - PHQ9
10. IF YOU CHECKED OFF ANY PROBLEMS, HOW DIFFICULT HAVE THESE PROBLEMS MADE IT FOR YOU TO DO YOUR WORK, TAKE CARE OF THINGS AT HOME, OR GET ALONG WITH OTHER PEOPLE: SOMEWHAT DIFFICULT
SUM OF ALL RESPONSES TO PHQ QUESTIONS 1-9: 4
SUM OF ALL RESPONSES TO PHQ QUESTIONS 1-9: 4

## 2020-11-13 ASSESSMENT — MIFFLIN-ST. JEOR: SCORE: 1665.28

## 2020-11-14 ASSESSMENT — ANXIETY QUESTIONNAIRES: GAD7 TOTAL SCORE: 0

## 2020-11-14 ASSESSMENT — PATIENT HEALTH QUESTIONNAIRE - PHQ9: SUM OF ALL RESPONSES TO PHQ QUESTIONS 1-9: 4

## 2020-11-14 ASSESSMENT — ASTHMA QUESTIONNAIRES: ACT_TOTALSCORE: 20

## 2020-11-17 ENCOUNTER — VIRTUAL VISIT (OUTPATIENT)
Dept: PSYCHOLOGY | Facility: CLINIC | Age: 50
End: 2020-11-17
Payer: COMMERCIAL

## 2020-11-17 DIAGNOSIS — F10.21 ALCOHOL USE DISORDER, MODERATE, IN EARLY REMISSION (H): ICD-10-CM

## 2020-11-17 DIAGNOSIS — F33.1 MAJOR DEPRESSIVE DISORDER, RECURRENT EPISODE, MODERATE (H): Primary | ICD-10-CM

## 2020-11-17 DIAGNOSIS — F41.1 GAD (GENERALIZED ANXIETY DISORDER): ICD-10-CM

## 2020-11-17 PROCEDURE — 90837 PSYTX W PT 60 MINUTES: CPT | Mod: 95 | Performed by: MARRIAGE & FAMILY THERAPIST
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
## 2020-11-17 NOTE — PATIENT INSTRUCTIONS
Patient states that her goals for the next week include:    1. Try to make home as clean and cozy as possible (without becoming too overwhelmed)  2. Spend time with friend from [place]    Patient's homework: write a list of 3-5 things she would like to learn or to know more about.

## 2020-11-18 ENCOUNTER — HOSPITAL ENCOUNTER (OUTPATIENT)
Dept: MAMMOGRAPHY | Facility: CLINIC | Age: 50
Discharge: HOME | End: 2020-11-18
Attending: PHYSICIAN ASSISTANT | Admitting: PHYSICIAN ASSISTANT
Payer: COMMERCIAL

## 2020-11-18 DIAGNOSIS — Z12.31 ENCOUNTER FOR SCREENING MAMMOGRAM FOR MALIGNANT NEOPLASM OF BREAST: ICD-10-CM

## 2020-11-18 PROCEDURE — 77067 SCR MAMMO BI INCL CAD: CPT

## 2020-11-24 ENCOUNTER — VIRTUAL VISIT (OUTPATIENT)
Dept: PSYCHOLOGY | Facility: CLINIC | Age: 50
End: 2020-11-24
Payer: COMMERCIAL

## 2020-11-24 DIAGNOSIS — F33.1 MAJOR DEPRESSIVE DISORDER, RECURRENT EPISODE, MODERATE (H): Primary | ICD-10-CM

## 2020-11-24 DIAGNOSIS — F10.21 ALCOHOL USE DISORDER, MODERATE, IN EARLY REMISSION (H): ICD-10-CM

## 2020-11-24 DIAGNOSIS — F41.1 ANXIETY STATE: ICD-10-CM

## 2020-11-24 DIAGNOSIS — F41.1 GAD (GENERALIZED ANXIETY DISORDER): ICD-10-CM

## 2020-11-24 PROCEDURE — 90837 PSYTX W PT 60 MINUTES: CPT | Mod: 95 | Performed by: MARRIAGE & FAMILY THERAPIST
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
## 2020-11-24 NOTE — PATIENT INSTRUCTIONS
Patient states that her goals for the next week include:    1. Continue planning baby shower (scheduled for late January)  2. Celebrate Thanksgiving  3. Request to change schedule at work (would like to work 3 8-hour shifts and one 4-hour shift from home per week)

## 2020-11-25 ENCOUNTER — OFFICE VISIT (OUTPATIENT)
Dept: SURGERY | Facility: CLINIC | Age: 50
End: 2020-11-25
Attending: PHYSICIAN ASSISTANT
Payer: COMMERCIAL

## 2020-11-25 VITALS
TEMPERATURE: 98.1 F | WEIGHT: 225 LBS | BODY MASS INDEX: 35.31 KG/M2 | DIASTOLIC BLOOD PRESSURE: 80 MMHG | SYSTOLIC BLOOD PRESSURE: 128 MMHG | HEIGHT: 67 IN

## 2020-11-25 DIAGNOSIS — L73.2 HIDRADENITIS SUPPURATIVA: Primary | ICD-10-CM

## 2020-11-25 DIAGNOSIS — L02.92 BOIL: ICD-10-CM

## 2020-11-25 PROCEDURE — 99243 OFF/OP CNSLTJ NEW/EST LOW 30: CPT | Performed by: SURGERY

## 2020-11-25 RX ORDER — CLINDAMYCIN PHOSPHATE 10 UG/ML
LOTION TOPICAL 2 TIMES DAILY PRN
Qty: 60 ML | Refills: 1 | Status: SHIPPED | OUTPATIENT
Start: 2020-11-25 | End: 2023-02-13

## 2020-11-25 ASSESSMENT — MIFFLIN-ST. JEOR: SCORE: 1665.28
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
## 2020-11-26 RX ORDER — PROPRANOLOL HYDROCHLORIDE 20 MG/1
20 TABLET ORAL 2 TIMES DAILY
Qty: 60 TABLET | Refills: 0 | Status: SHIPPED | OUTPATIENT
Start: 2020-11-26 | End: 2021-01-26

## 2020-11-27 ENCOUNTER — TELEPHONE (OUTPATIENT)
Dept: DERMATOLOGY | Facility: CLINIC | Age: 50
End: 2020-11-27
Payer: COMMERCIAL

## 2020-11-27 NOTE — TELEPHONE ENCOUNTER
[facility] Call Center    Phone Message    May a detailed message be left on voicemail: yes     Reason for Call: Appointment Intake    Referring Provider Name: Dr Colby Ramos  Diagnosis and/or Symptoms: Hidradenitis suppurativa    Action Taken: Message routed to:  [facility] ([facility] Dermatology    Travel Screening: Not Applicable

## 2020-12-08 ENCOUNTER — VIRTUAL VISIT (OUTPATIENT)
Dept: PSYCHOLOGY | Facility: CLINIC | Age: 50
End: 2020-12-08
Payer: COMMERCIAL

## 2020-12-08 DIAGNOSIS — F33.1 MAJOR DEPRESSIVE DISORDER, RECURRENT EPISODE, MODERATE (H): ICD-10-CM

## 2020-12-08 DIAGNOSIS — F41.1 GAD (GENERALIZED ANXIETY DISORDER): ICD-10-CM

## 2020-12-08 DIAGNOSIS — F10.21 ALCOHOL USE DISORDER, MODERATE, IN EARLY REMISSION (H): Primary | ICD-10-CM

## 2020-12-08 PROCEDURE — 90834 PSYTX W PT 45 MINUTES: CPT | Mod: 95 | Performed by: MARRIAGE & FAMILY THERAPIST
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
## 2020-12-08 NOTE — PATIENT INSTRUCTIONS
Patient states that her goals for the next two weeks include:    1. Work on managing cravings and thought processes related to alcohol  2. Figure out [place] and birthday presents; wrap gifts

## 2020-12-22 ENCOUNTER — VIRTUAL VISIT (OUTPATIENT)
Dept: PSYCHOLOGY | Facility: CLINIC | Age: 50
End: 2020-12-22
Payer: COMMERCIAL

## 2020-12-22 DIAGNOSIS — F33.1 MAJOR DEPRESSIVE DISORDER, RECURRENT EPISODE, MODERATE (H): Primary | ICD-10-CM

## 2020-12-22 DIAGNOSIS — F10.21 ALCOHOL USE DISORDER, MODERATE, IN EARLY REMISSION (H): ICD-10-CM

## 2020-12-22 DIAGNOSIS — F41.1 GAD (GENERALIZED ANXIETY DISORDER): ICD-10-CM

## 2020-12-22 PROCEDURE — 90834 PSYTX W PT 45 MINUTES: CPT | Mod: 95 | Performed by: MARRIAGE & FAMILY THERAPIST
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
## 2020-12-22 NOTE — PATIENT INSTRUCTIONS
Patient states that her goals for the next two weeks include:    1. Keep mental spirits up  2. Continue taking black cohosh to help with hot flashes  3. Every day make the decision to not drink alcohol

## 2021-01-01 NOTE — TELEPHONE ENCOUNTER
FUTURE VISIT INFORMATION      FUTURE VISIT INFORMATION:    Date: 4.1.21    Time: 2:45    Location: [facility]  REFERRAL INFORMATION:    Referring provider:  Dr. Colby Ramos    Referring providers clinic:  [facility]    Reason for visit/diagnosis  Hidradenitis suppurativa // Referring Provider Name: Dr Colby Ramos    RECORDS REQUESTED FROM:       Clinic name Comments Records Status Imaging Status   [facility] 11.25.20  Dr. Ramos Epic na   [facility]  Tanvir Peterson PA-C Epic na   [facility] 9.16.19  CHARLEY Frausto na

## 2021-01-05 ENCOUNTER — VIRTUAL VISIT (OUTPATIENT)
Dept: PSYCHOLOGY | Facility: CLINIC | Age: 51
End: 2021-01-05
Payer: COMMERCIAL

## 2021-01-05 DIAGNOSIS — F41.1 GAD (GENERALIZED ANXIETY DISORDER): ICD-10-CM

## 2021-01-05 DIAGNOSIS — F10.21 ALCOHOL USE DISORDER, MODERATE, IN EARLY REMISSION (H): ICD-10-CM

## 2021-01-05 DIAGNOSIS — F33.1 MAJOR DEPRESSIVE DISORDER, RECURRENT EPISODE, MODERATE (H): Primary | ICD-10-CM

## 2021-01-05 PROCEDURE — 90834 PSYTX W PT 45 MINUTES: CPT | Mod: 95 | Performed by: MARRIAGE & FAMILY THERAPIST

## 2021-01-05 NOTE — PATIENT INSTRUCTIONS
Patient states that her goals for the next two weeks include:    1. Prep for baby shower and order food  2. Continue crafting/crocheting and watching Melissa Briceno
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
## 2021-01-10 ENCOUNTER — HEALTH MAINTENANCE LETTER (OUTPATIENT)
Age: 51
End: 2021-01-10

## 2021-01-26 DIAGNOSIS — F41.1 ANXIETY STATE: ICD-10-CM

## 2021-01-26 RX ORDER — PROPRANOLOL HYDROCHLORIDE 20 MG/1
TABLET ORAL
Qty: 60 TABLET | Refills: 0 | Status: SHIPPED | OUTPATIENT
Start: 2021-01-26 | End: 2021-02-21

## 2021-02-09 ENCOUNTER — VIRTUAL VISIT (OUTPATIENT)
Dept: PSYCHOLOGY | Facility: CLINIC | Age: 51
End: 2021-02-09
Payer: COMMERCIAL

## 2021-02-09 DIAGNOSIS — F41.1 GAD (GENERALIZED ANXIETY DISORDER): ICD-10-CM

## 2021-02-09 DIAGNOSIS — F33.1 MAJOR DEPRESSIVE DISORDER, RECURRENT EPISODE, MODERATE (H): Primary | ICD-10-CM

## 2021-02-09 DIAGNOSIS — F10.21 ALCOHOL USE DISORDER, MODERATE, IN EARLY REMISSION (H): ICD-10-CM

## 2021-02-09 PROCEDURE — 90834 PSYTX W PT 45 MINUTES: CPT | Mod: 95 | Performed by: MARRIAGE & FAMILY THERAPIST

## 2021-02-09 ASSESSMENT — ANXIETY QUESTIONNAIRES
3. WORRYING TOO MUCH ABOUT DIFFERENT THINGS: SEVERAL DAYS
5. BEING SO RESTLESS THAT IT IS HARD TO SIT STILL: SEVERAL DAYS
7. FEELING AFRAID AS IF SOMETHING AWFUL MIGHT HAPPEN: NOT AT ALL
1. FEELING NERVOUS, ANXIOUS, OR ON EDGE: MORE THAN HALF THE DAYS
2. NOT BEING ABLE TO STOP OR CONTROL WORRYING: SEVERAL DAYS
GAD7 TOTAL SCORE: 7
6. BECOMING EASILY ANNOYED OR IRRITABLE: SEVERAL DAYS
4. TROUBLE RELAXING: SEVERAL DAYS

## 2021-02-09 ASSESSMENT — PATIENT HEALTH QUESTIONNAIRE - PHQ9: SUM OF ALL RESPONSES TO PHQ QUESTIONS 1-9: 10
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
## 2021-02-09 NOTE — PATIENT INSTRUCTIONS
Patient states that her goals for the next two weeks include:    1. Continue reading  2. Continue crafting/crocheting  3. Continue managing sobriety every day    Patient stated that she may like to discuss goal-setting in the future.

## 2021-02-10 ASSESSMENT — ANXIETY QUESTIONNAIRES: GAD7 TOTAL SCORE: 7

## 2021-02-15 DIAGNOSIS — M25.50 CHRONIC JOINT PAIN: Primary | ICD-10-CM

## 2021-02-15 DIAGNOSIS — G89.29 CHRONIC JOINT PAIN: Primary | ICD-10-CM

## 2021-02-15 RX ORDER — NALTREXONE HYDROCHLORIDE 50 MG/1
50 TABLET, FILM COATED ORAL DAILY
Qty: 30 TABLET | Refills: 0 | Status: SHIPPED | OUTPATIENT
Start: 2021-02-15 | End: 2021-02-15

## 2021-02-15 RX ORDER — NALTREXONE HYDROCHLORIDE 50 MG/1
50 TABLET, FILM COATED ORAL DAILY
Qty: 30 TABLET | Refills: 0 | Status: SHIPPED | OUTPATIENT
Start: 2021-02-15 | End: 2021-02-18

## 2021-02-18 ENCOUNTER — VIRTUAL VISIT (OUTPATIENT)
Dept: FAMILY MEDICINE | Facility: OTHER | Age: 51
End: 2021-02-18
Payer: COMMERCIAL

## 2021-02-18 DIAGNOSIS — M25.50 CHRONIC JOINT PAIN: ICD-10-CM

## 2021-02-18 DIAGNOSIS — G89.29 CHRONIC JOINT PAIN: ICD-10-CM

## 2021-02-18 DIAGNOSIS — Z87.898 HISTORY OF CHRONIC COUGH: Primary | ICD-10-CM

## 2021-02-18 DIAGNOSIS — F10.10 ETOH ABUSE: ICD-10-CM

## 2021-02-18 PROCEDURE — 99213 OFFICE O/P EST LOW 20 MIN: CPT | Mod: 95 | Performed by: PHYSICIAN ASSISTANT

## 2021-02-18 RX ORDER — ALBUTEROL SULFATE 0.83 MG/ML
2.5 SOLUTION RESPIRATORY (INHALATION) EVERY 6 HOURS PRN
Qty: 180 ML | Refills: 3 | Status: SHIPPED | OUTPATIENT
Start: 2021-02-18 | End: 2023-12-27

## 2021-02-18 RX ORDER — NALTREXONE HYDROCHLORIDE 50 MG/1
50 TABLET, FILM COATED ORAL DAILY
Qty: 90 TABLET | Refills: 3 | Status: SHIPPED | OUTPATIENT
Start: 2021-02-18 | End: 2021-11-15

## 2021-02-18 NOTE — PROGRESS NOTES
Kesha is a 51 year old who is being evaluated via a billable video visit.      How would you like to obtain your AVS? MyChart  If the video visit is dropped, the invitation should be resent by: Text to cell phone: [phone]  Will anyone else be joining your video visit? No    Video Start Time: 0854    Assessment & Plan     Chronic joint pain  ongoing    History of chronic cough  Historically noted during early spring  - albuterol (PROVENTIL) (2.5 MG/3ML) 0.083% neb solution; Take 1 vial (2.5 mg) by nebulization every 6 hours as needed for shortness of breath / dyspnea or wheezing    ETOH abuse - history  - naltrexone (DEPADE/REVIA) 50 MG tablet; Take 1 tablet (50 mg) by mouth daily       Work on weight loss  Regular exercise  Return in about 6 months (around 8/18/2021) for recheck of current condition, if symptoms do not improve, Medication Recheck.    Tanvir Ybarra PA-C  [facility]   Kesha is a 51 year old who presents for the following health issues       Naltrexone Refill         History of Present Illness       She eats 4 or more servings of fruits and vegetables daily.She consumes 0 sweetened beverage(s) daily.She exercises with enough effort to increase her heart rate 9 or less minutes per day.  She exercises with enough effort to increase her heart rate 3 or less days per week.   She is taking medications regularly.         Review of Systems   Constitutional, HEENT, cardiovascular, pulmonary, GI, , musculoskeletal, neuro, skin, endocrine and psych systems are negative, except as otherwise noted.      Objective           Vitals:  No vitals were obtained today due to virtual visit.    Physical Exam   GENERAL: Healthy, alert and no distress  EYES: Eyes grossly normal to inspection.  No discharge or erythema, or obvious scleral/conjunctival abnormalities.  RESP: No audible wheeze, cough, or visible cyanosis.  No visible retractions or increased work of breathing.     SKIN: Visible skin clear. No significant rash, abnormal pigmentation or lesions.  NEURO: Cranial nerves grossly intact.  Mentation and speech appropriate for age.  PSYCH: Mentation appears normal, affect normal/bright, judgement and insight intact, normal speech and appearance well-groomed.    No results found for this or any previous visit (from the past 24 hour(s)).          Video-Visit Details    Type of service:  Video Visit    Video End Time:9:00 AM    Originating Location (pt. Location): Home    Distant Location (provider location):  [facility]     Platform used for Video Visit: [employer]

## 2021-02-20 DIAGNOSIS — F41.1 ANXIETY STATE: ICD-10-CM

## 2021-02-21 RX ORDER — PROPRANOLOL HYDROCHLORIDE 20 MG/1
TABLET ORAL
Qty: 60 TABLET | Refills: 0 | Status: SHIPPED | OUTPATIENT
Start: 2021-02-21 | End: 2021-03-01

## 2021-02-28 DIAGNOSIS — F41.1 ANXIETY STATE: ICD-10-CM

## 2021-03-01 ENCOUNTER — DOCUMENTATION ONLY (OUTPATIENT)
Dept: CARE COORDINATION | Facility: CLINIC | Age: 51
End: 2021-03-01

## 2021-03-01 RX ORDER — PROPRANOLOL HYDROCHLORIDE 20 MG/1
TABLET ORAL
Qty: 60 TABLET | Refills: 0 | Status: SHIPPED | OUTPATIENT
Start: 2021-03-01 | End: 2021-05-21

## 2021-03-09 ENCOUNTER — VIRTUAL VISIT (OUTPATIENT)
Dept: PSYCHOLOGY | Facility: CLINIC | Age: 51
End: 2021-03-09
Payer: COMMERCIAL

## 2021-03-09 DIAGNOSIS — F41.1 GAD (GENERALIZED ANXIETY DISORDER): ICD-10-CM

## 2021-03-09 DIAGNOSIS — F33.1 MAJOR DEPRESSIVE DISORDER, RECURRENT EPISODE, MODERATE (H): Primary | ICD-10-CM

## 2021-03-09 PROCEDURE — 90834 PSYTX W PT 45 MINUTES: CPT | Mod: 95 | Performed by: MARRIAGE & FAMILY THERAPIST

## 2021-03-09 NOTE — PATIENT INSTRUCTIONS
"Patient states that her goals for the next two weeks include:    1. Continue reading  2. Continue working on new [employer] project (\"get my mojo back\")    "
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
## 2021-03-10 NOTE — NURSING NOTE
Weight management plan: Patient was referred to their PCP to discuss a diet and exercise plan.  
detailed exam

## 2021-03-12 DIAGNOSIS — Z72.0 TOBACCO ABUSE: Primary | ICD-10-CM

## 2021-03-16 NOTE — TELEPHONE ENCOUNTER
Routing refill request to provider for review/approval because:  Medication is reported/historical    TIM LongoriaN, RN  [facility]

## 2021-03-23 ENCOUNTER — VIRTUAL VISIT (OUTPATIENT)
Dept: PSYCHOLOGY | Facility: CLINIC | Age: 51
End: 2021-03-23
Payer: COMMERCIAL

## 2021-03-23 DIAGNOSIS — F33.1 MAJOR DEPRESSIVE DISORDER, RECURRENT EPISODE, MODERATE (H): ICD-10-CM

## 2021-03-23 DIAGNOSIS — F10.21 ALCOHOL USE DISORDER, MODERATE, IN EARLY REMISSION (H): ICD-10-CM

## 2021-03-23 DIAGNOSIS — F41.1 GAD (GENERALIZED ANXIETY DISORDER): Primary | ICD-10-CM

## 2021-03-23 PROCEDURE — 90834 PSYTX W PT 45 MINUTES: CPT | Mod: 95 | Performed by: MARRIAGE & FAMILY THERAPIST

## 2021-03-23 NOTE — PATIENT INSTRUCTIONS
Patient states that her goals for the next two weeks include:    1. Build quilt on big table  2. Prepare for grandchild's arrival  3. Go to taco Thursday at local restaurant and continue to seek social outlets
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
## 2021-03-29 ENCOUNTER — TELEPHONE (OUTPATIENT)
Dept: FAMILY MEDICINE | Facility: OTHER | Age: 51
End: 2021-03-29

## 2021-03-29 DIAGNOSIS — Z87.898 HISTORY OF CHRONIC COUGH: ICD-10-CM

## 2021-03-29 RX ORDER — ALBUTEROL SULFATE 90 UG/1
2 AEROSOL, METERED RESPIRATORY (INHALATION) EVERY 6 HOURS
Qty: 18 G | Refills: 1 | Status: SHIPPED | OUTPATIENT
Start: 2021-03-29 | End: 2023-12-27

## 2021-03-29 NOTE — TELEPHONE ENCOUNTER
Routing refill request to provider for review/approval because:  Drug not active on patient's medication list    Fanta Neumann RN on 3/29/2021 at 2:52 PM

## 2021-04-01 ENCOUNTER — PRE VISIT (OUTPATIENT)
Dept: DERMATOLOGY | Facility: CLINIC | Age: 51
End: 2021-04-01

## 2021-04-06 ENCOUNTER — VIRTUAL VISIT (OUTPATIENT)
Dept: PSYCHOLOGY | Facility: CLINIC | Age: 51
End: 2021-04-06
Payer: COMMERCIAL

## 2021-04-06 DIAGNOSIS — F41.1 GAD (GENERALIZED ANXIETY DISORDER): ICD-10-CM

## 2021-04-06 DIAGNOSIS — F33.1 MAJOR DEPRESSIVE DISORDER, RECURRENT EPISODE, MODERATE (H): Primary | ICD-10-CM

## 2021-04-06 PROCEDURE — 90834 PSYTX W PT 45 MINUTES: CPT | Mod: 95 | Performed by: MARRIAGE & FAMILY THERAPIST
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
## 2021-04-06 NOTE — PATIENT INSTRUCTIONS
Patient states that her goals for the next three weeks include:    1. Start a new quilt  2. Opal a little  3. Work w/dogs, socialize and train puppy, and continue getting outside

## 2021-04-15 DIAGNOSIS — F33.0 MAJOR DEPRESSIVE DISORDER, RECURRENT EPISODE, MILD (H): ICD-10-CM

## 2021-04-15 DIAGNOSIS — F41.1 GAD (GENERALIZED ANXIETY DISORDER): Primary | ICD-10-CM

## 2021-04-15 RX ORDER — DULOXETIN HYDROCHLORIDE 60 MG/1
60 CAPSULE, DELAYED RELEASE ORAL DAILY
Qty: 90 CAPSULE | Refills: 1 | Status: SHIPPED | OUTPATIENT
Start: 2021-04-15 | End: 2021-10-02

## 2021-04-15 NOTE — TELEPHONE ENCOUNTER
Pending Prescriptions:                       Disp   Refills    DULoxetine (CYMBALTA) 60 MG capsule                        Sig: Take 1 capsule (60 mg) by mouth        Routing refill request to provider for review/approval because:  Medication is reported/historical    Oralia Mascorro RN, BSN  [place]  April 15, 2021

## 2021-04-20 ENCOUNTER — VIRTUAL VISIT (OUTPATIENT)
Dept: FAMILY MEDICINE | Facility: CLINIC | Age: 51
End: 2021-04-20
Payer: COMMERCIAL

## 2021-04-20 ENCOUNTER — NURSE TRIAGE (OUTPATIENT)
Dept: NURSING | Facility: CLINIC | Age: 51
End: 2021-04-20

## 2021-04-20 DIAGNOSIS — Z20.822 SUSPECTED COVID-19 VIRUS INFECTION: Primary | ICD-10-CM

## 2021-04-20 PROCEDURE — 99213 OFFICE O/P EST LOW 20 MIN: CPT | Mod: 95 | Performed by: NURSE PRACTITIONER

## 2021-04-20 NOTE — TELEPHONE ENCOUNTER
Kesha is calling and is requesting a covid test.  Symptoms are shortness of breath and congestion itching in ears and throat.  Fever at 02:00am 100.3. Body aches headaches and fatigue and sore throat.  Symptoms started last night.  Some symptoms started on Saturday, sore throat.  Kesha is requesting a virtual visit for covid symptoms.        COVID 19 Nurse Triage Plan/Patient Instructions    Please be aware that novel coronavirus (COVID-19) may be circulating in the community. If you develop symptoms such as fever, cough, or SOB or if you have concerns about the presence of another infection including coronavirus (COVID-19), please contact your health care provider or visit [email or web address].     Disposition/Instructions    Virtual Visit with provider recommended. Reference Visit Selection Guide.    Thank you for taking steps to prevent the spread of this virus.  o Limit your contact with others.  o Wear a simple mask to cover your cough.  o Wash your hands well and often.    Resources    [facility]: About COVID-19: [email or web address]    CDC: What to Do If You're Sick: www.cdc.gov/coronavirus/2019-ncov/about/steps-when-sick.html    CDC: Ending Home Isolation: www.cdc.gov/coronavirus/2019-ncov/hcp/disposition-in-home-patients.html     CDC: Caring for Someone: www.cdc.gov/coronavirus/2019-ncov/if-you-are-sick/care-for-someone.html     [facility]: Interim Guidance for Hospital Discharge to Home: www.health.[facility]/diseases/coronavirus/hcp/hospdischarge.pdf    [facility] clinical trials (COVID-19 research studies): [email or web address]-clinical-trials     Below are the COVID-19 hotlines at the [facility] ([facility]). Interpreters are available.   o For health questions: Call [phone] or 1-513.165.6205 (7 a.m. to 7 p.m.)  o For questions about schools and childcare: Call [phone] or 1-867.268.6639 (7 a.m. to 7 p.m.)                         Reason  for Disposition    [1] COVID-19 infection suspected by caller or triager AND [2] mild symptoms (cough, fever, or others) AND [3] no complications or SOB    Additional Information    Negative: SEVERE difficulty breathing (e.g., struggling for each breath, speaks in single words)    Negative: Difficult to awaken or acting confused (e.g., disoriented, slurred speech)    Negative: Bluish (or gray) lips or face now    Negative: Shock suspected (e.g., cold/pale/clammy skin, too weak to stand, low BP, rapid pulse)    Negative: Sounds like a life-threatening emergency to the triager    Negative: SEVERE or constant chest pain or pressure (Exception: mild central chest pain, present only when coughing)    Negative: MODERATE difficulty breathing (e.g., speaks in phrases, SOB even at rest, pulse 100-120)    Negative: [1] Headache AND [2] stiff neck (can't touch chin to chest)    Negative: MILD difficulty breathing (e.g., minimal/no SOB at rest, SOB with walking, pulse <100)    Negative: Chest pain or pressure    Negative: Patient sounds very sick or weak to the triager    Negative: Fever > 103 F (39.4 C)    Negative: [1] Fever > 101 F (38.3 C) AND [2] age > 60    Negative: [1] Fever > 100.0 F (37.8 C) AND [2] bedridden (e.g., nursing home patient, CVA, chronic illness, recovering from surgery)    Negative: [1] HIGH RISK patient (e.g., age > 64 years, diabetes, heart or lung disease, weak immune system) AND [2] new or worsening symptoms    Negative: [1] HIGH RISK patient AND [2] influenza is widespread in the community AND [3] ONE OR MORE respiratory symptoms: cough, sore throat, runny or stuffy nose    Negative: [1] HIGH RISK patient AND [2] influenza exposure within the last 7 days AND [3] ONE OR MORE respiratory symptoms: cough, sore throat, runny or stuffy nose    Negative: Fever present > 3 days (72 hours)    Negative: [1] Fever returns after gone for over 24 hours AND [2] symptoms worse or not improved    Negative: [1]  Continuous (nonstop) coughing interferes with work or school AND [2] no improvement using cough treatment per protocol    Protocols used: CORONAVIRUS (COVID-19) DIAGNOSED OR [id number] 1.3

## 2021-04-20 NOTE — PROGRESS NOTES
Kesha is a 51 year old who is being evaluated via a billable telephone visit.      What phone number would you like to be contacted at? [phone]  How would you like to obtain your AVS? MyChart    Assessment & Plan     Suspected COVID-19 virus infection  COVID-19 test ordered.  Discussed symptomatic management and quarantine with patient.  - Symptomatic COVID-19 Virus (Coronavirus) by PCR; Future                 Return in about 1 week (around 4/27/2021) for ongoing symptoms if not improving.    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   Sore Throat: YES  Cough: YES-non-productive, productive of yellow sputum, with shortness of breath  Wheeze: YES  Decreased Appetite: No  Nausea: no  Vomiting: no  Diarrhea: no  Dysuria/Freq.: no  Dysuria or Hematuria: no  Fatigue/Achiness: YES  Sick/Strep Exposure: unknown works at a school  Therapies tried and outcome: nebulizer, zyrtec, inhaler linwood seltzer cold and flu helping a little    Albuterol inhaler has been helping with the sob. Fever was present last night.         Review of Systems   Constitutional, HEENT, cardiovascular, pulmonary, gi and gu systems are negative, except as otherwise noted.      Objective           Vitals:  No vitals were obtained today due to virtual visit.    Physical Exam   healthy, alert and no distress  PSYCH: Alert and oriented times 3; coherent speech, normal   rate and volume, able to articulate logical thoughts, able   to  abstract reason, no tangential thoughts, no hallucinations   or delusions  Her affect is normal  RESP: No cough, slight audible wheezing, able to talk in full sentences  Remainder of exam unable to be completed due to telephone visits                Phone call duration: 5 minutes

## 2021-04-28 DIAGNOSIS — Z72.0 TOBACCO ABUSE: ICD-10-CM

## 2021-04-28 NOTE — TELEPHONE ENCOUNTER
Prescription was sent 3/16/2021 for #60 with 1 refill.  Pharmacy notified via E-Prescribe refusal.     TIM LongoriaN, RN  [facility]

## 2021-04-30 ENCOUNTER — VIRTUAL VISIT (OUTPATIENT)
Dept: PSYCHOLOGY | Facility: CLINIC | Age: 51
End: 2021-04-30
Payer: COMMERCIAL

## 2021-04-30 DIAGNOSIS — F41.1 GAD (GENERALIZED ANXIETY DISORDER): ICD-10-CM

## 2021-04-30 DIAGNOSIS — F33.1 MAJOR DEPRESSIVE DISORDER, RECURRENT EPISODE, MODERATE (H): Primary | ICD-10-CM

## 2021-04-30 PROCEDURE — 90837 PSYTX W PT 60 MINUTES: CPT | Mod: 95 | Performed by: MARRIAGE & FAMILY THERAPIST

## 2021-04-30 NOTE — PATIENT INSTRUCTIONS
Patient states that her goals for the next two weeks include:    1. Plan more activities to look forward to  2. Opal a little  3. May work on quilt kits  4. Get outside more planting/gardening and lawn care)
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
## 2021-05-14 ENCOUNTER — VIRTUAL VISIT (OUTPATIENT)
Dept: PSYCHOLOGY | Facility: CLINIC | Age: 51
End: 2021-05-14
Payer: COMMERCIAL

## 2021-05-14 DIAGNOSIS — F41.1 GAD (GENERALIZED ANXIETY DISORDER): Primary | ICD-10-CM

## 2021-05-14 DIAGNOSIS — F33.1 MAJOR DEPRESSIVE DISORDER, RECURRENT EPISODE, MODERATE (H): ICD-10-CM

## 2021-05-14 PROCEDURE — 90834 PSYTX W PT 45 MINUTES: CPT | Mod: 95 | Performed by: MARRIAGE & FAMILY THERAPIST

## 2021-05-14 ASSESSMENT — ANXIETY QUESTIONNAIRES
GAD7 TOTAL SCORE: 6
5. BEING SO RESTLESS THAT IT IS HARD TO SIT STILL: NOT AT ALL
6. BECOMING EASILY ANNOYED OR IRRITABLE: MORE THAN HALF THE DAYS
7. FEELING AFRAID AS IF SOMETHING AWFUL MIGHT HAPPEN: NOT AT ALL
1. FEELING NERVOUS, ANXIOUS, OR ON EDGE: MORE THAN HALF THE DAYS
2. NOT BEING ABLE TO STOP OR CONTROL WORRYING: SEVERAL DAYS
3. WORRYING TOO MUCH ABOUT DIFFERENT THINGS: SEVERAL DAYS
IF YOU CHECKED OFF ANY PROBLEMS ON THIS QUESTIONNAIRE, HOW DIFFICULT HAVE THESE PROBLEMS MADE IT FOR YOU TO DO YOUR WORK, TAKE CARE OF THINGS AT HOME, OR GET ALONG WITH OTHER PEOPLE: VERY DIFFICULT
4. TROUBLE RELAXING: NOT AT ALL

## 2021-05-14 ASSESSMENT — PATIENT HEALTH QUESTIONNAIRE - PHQ9: SUM OF ALL RESPONSES TO PHQ QUESTIONS 1-9: 5
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
## 2021-05-14 NOTE — PATIENT INSTRUCTIONS
Patient states that her goals for the next two weeks include:    1. Spend time training puppy  2. Work on Tongan for friend's wedding gift  3. Be outside and enjoy cool weather

## 2021-05-15 ASSESSMENT — ANXIETY QUESTIONNAIRES: GAD7 TOTAL SCORE: 6

## 2021-05-21 DIAGNOSIS — F41.1 ANXIETY STATE: ICD-10-CM

## 2021-05-21 RX ORDER — PROPRANOLOL HYDROCHLORIDE 20 MG/1
TABLET ORAL
Qty: 60 TABLET | Refills: 0 | Status: SHIPPED | OUTPATIENT
Start: 2021-05-21 | End: 2021-06-03

## 2021-05-28 ENCOUNTER — VIRTUAL VISIT (OUTPATIENT)
Dept: PSYCHOLOGY | Facility: CLINIC | Age: 51
End: 2021-05-28
Payer: COMMERCIAL

## 2021-05-28 DIAGNOSIS — F41.1 GAD (GENERALIZED ANXIETY DISORDER): Primary | ICD-10-CM

## 2021-05-28 DIAGNOSIS — F33.1 MAJOR DEPRESSIVE DISORDER, RECURRENT EPISODE, MODERATE (H): ICD-10-CM

## 2021-05-28 PROCEDURE — 90834 PSYTX W PT 45 MINUTES: CPT | Mod: 95 | Performed by: MARRIAGE & FAMILY THERAPIST
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
## 2021-05-28 NOTE — PATIENT INSTRUCTIONS
Patient states that her goals for the next three weeks include:    1. Continue painting interior of house  2. Plan 4-tse adventures with sister-in-law

## 2021-06-03 DIAGNOSIS — F41.1 ANXIETY STATE: ICD-10-CM

## 2021-06-03 RX ORDER — PROPRANOLOL HYDROCHLORIDE 20 MG/1
TABLET ORAL
Qty: 60 TABLET | Refills: 0 | Status: SHIPPED | OUTPATIENT
Start: 2021-06-03 | End: 2021-07-05

## 2021-06-18 ENCOUNTER — VIRTUAL VISIT (OUTPATIENT)
Dept: PSYCHOLOGY | Facility: CLINIC | Age: 51
End: 2021-06-18
Payer: COMMERCIAL

## 2021-06-18 DIAGNOSIS — F33.1 MAJOR DEPRESSIVE DISORDER, RECURRENT EPISODE, MODERATE (H): ICD-10-CM

## 2021-06-18 DIAGNOSIS — F41.1 GAD (GENERALIZED ANXIETY DISORDER): Primary | ICD-10-CM

## 2021-06-18 DIAGNOSIS — F10.21 ALCOHOL USE DISORDER, MODERATE, IN EARLY REMISSION (H): ICD-10-CM

## 2021-06-18 PROCEDURE — 90834 PSYTX W PT 45 MINUTES: CPT | Mod: 95 | Performed by: MARRIAGE & FAMILY THERAPIST

## 2021-06-18 NOTE — PATIENT INSTRUCTIONS
Patient states that her goals for the next two weeks include:    1. Update resume  2. Finish painting laundry room  3. Consider having garage sale and connect with garage sale queen friend
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
## 2021-06-28 DIAGNOSIS — H69.92 DYSFUNCTION OF EUSTACHIAN TUBE, LEFT: ICD-10-CM

## 2021-06-28 DIAGNOSIS — Z72.0 TOBACCO ABUSE: ICD-10-CM

## 2021-06-28 DIAGNOSIS — H65.92 MIDDLE EAR EFFUSION, LEFT: ICD-10-CM

## 2021-07-03 DIAGNOSIS — F41.1 ANXIETY STATE: ICD-10-CM

## 2021-07-05 RX ORDER — PROPRANOLOL HYDROCHLORIDE 20 MG/1
TABLET ORAL
Qty: 60 TABLET | Refills: 3 | Status: SHIPPED | OUTPATIENT
Start: 2021-07-05 | End: 2021-11-01

## 2021-09-27 NOTE — TELEPHONE ENCOUNTER
FUTURE VISIT INFORMATION      FUTURE VISIT INFORMATION:    Date: 10.14.21     Time: 1 PM    Location: [facility]  REFERRAL INFORMATION:    Referring provider: Dr. Ramos    Referring providers clinic: [facility]    Reason for visit/diagnosis  HS    RECORDS REQUESTED FROM:       Clinic name Comments Records Status Imaging Status   [facility]  11.25.20 Richard  11.13.20 Tanvir Peterson  9.16.19 [facility]rell [facility]

## 2021-10-02 DIAGNOSIS — F33.0 MAJOR DEPRESSIVE DISORDER, RECURRENT EPISODE, MILD (H): ICD-10-CM

## 2021-10-02 DIAGNOSIS — F41.1 GAD (GENERALIZED ANXIETY DISORDER): ICD-10-CM

## 2021-10-02 RX ORDER — DULOXETIN HYDROCHLORIDE 60 MG/1
60 CAPSULE, DELAYED RELEASE ORAL DAILY
Qty: 30 CAPSULE | Refills: 0 | Status: SHIPPED | OUTPATIENT
Start: 2021-10-02 | End: 2021-11-08

## 2021-10-06 ENCOUNTER — TELEPHONE (OUTPATIENT)
Dept: FAMILY MEDICINE | Facility: OTHER | Age: 51
End: 2021-10-06
Payer: COMMERCIAL

## 2021-10-06 NOTE — TELEPHONE ENCOUNTER
Patient Quality Outreach Summary      Summary:    Patient is due/failing the following:   Cervical Cancer Screening - PAP Needed and Physical   Colon cancer screening   Type of outreach:    Sent TC Website Promotions message.    Questions for provider review:    None                                                                                                                    Melinda Gauthier CMA       Chart routed to Care Team.

## 2021-10-06 NOTE — TELEPHONE ENCOUNTER
Sent mychart message advising one-time raza refill, an appointment for an office visit is needed for future refills

## 2021-10-14 ENCOUNTER — PRE VISIT (OUTPATIENT)
Dept: DERMATOLOGY | Facility: CLINIC | Age: 51
End: 2021-10-14

## 2021-10-21 DIAGNOSIS — Z72.0 TOBACCO ABUSE: ICD-10-CM

## 2021-10-21 DIAGNOSIS — J45.30 MILD PERSISTENT ASTHMA WITHOUT COMPLICATION: Primary | ICD-10-CM

## 2021-10-21 NOTE — TELEPHONE ENCOUNTER
Patient notified rx was denied she needs follow up with Tanvir prior to anymore refills. She understands and will call back to schedule

## 2021-10-23 ENCOUNTER — HEALTH MAINTENANCE LETTER (OUTPATIENT)
Age: 51
End: 2021-10-23

## 2021-11-01 DIAGNOSIS — F41.1 ANXIETY STATE: ICD-10-CM

## 2021-11-01 RX ORDER — PROPRANOLOL HYDROCHLORIDE 20 MG/1
TABLET ORAL
Qty: 60 TABLET | Refills: 2 | Status: SHIPPED | OUTPATIENT
Start: 2021-11-01 | End: 2021-11-15

## 2021-11-05 DIAGNOSIS — F41.1 GAD (GENERALIZED ANXIETY DISORDER): ICD-10-CM

## 2021-11-05 DIAGNOSIS — F33.0 MAJOR DEPRESSIVE DISORDER, RECURRENT EPISODE, MILD (H): ICD-10-CM

## 2021-11-05 NOTE — TELEPHONE ENCOUNTER
Pending Prescriptions:                       Disp   Refills    DULoxetine (CYMBALTA) 60 MG capsule [Pharm*30 cap*0        Sig: TAKE 1 CAPSULE (60 MG) BY MOUTH DAILY - DUE FOR           FOLLOW UP  Routing refill request to provider for review/approval because:  Labs out of range:  PHQ-9 score:    PHQ 5/14/2021   PHQ-9 Total Score 5   Q9: Thoughts of better off dead/self-harm past 2 weeks Not at all   Some encounter information is confidential and restricted. Go to Review Flowsheets activity to see all data.     HONEY-7 SCORE 11/13/2020 2/9/2021 5/14/2021   Total Score 0 (minimal anxiety) - -   Total Score 0 7 6   Some encounter information is confidential and restricted. Go to Review Flowsheets activity to see all data.        DULoxetine (CYMBALTA) 60 MG capsule 30 capsule 0 10/2/2021  No   Sig - Route: TAKE 1 CAPSULE (60 MG) BY MOUTH DAILY - Oral   Sent to pharmacy as: DULoxetine HCl 60 MG Oral Capsule Delayed Release Particles (CYMBALTA)   Class: E-Prescribe   Notes to Pharmacy: Patient due for follow-up.   Order: [id number]   E-Prescribing Status: Receipt confirmed by pharmacy (10/2/2021 12:02 PM CDT)     Nora Gilliam RN on 11/5/2021 at 2:37 PM

## 2021-11-08 RX ORDER — DULOXETIN HYDROCHLORIDE 60 MG/1
CAPSULE, DELAYED RELEASE ORAL
Qty: 30 CAPSULE | Refills: 0 | Status: SHIPPED | OUTPATIENT
Start: 2021-11-08 | End: 2021-12-06

## 2021-11-12 NOTE — PROGRESS NOTES
Assessment & Plan     Major depressive disorder, recurrent episode, mild (H)  Doing very well no new concerns.  Follow-up as needed.    Morbid obesity (H)  Still problematic increase physical exercise is strongly advised.    Mild persistent asthma without complication  Most concerning today as her asthma control test was at a 9.    - fluticasone-vilanterol (BREO ELLIPTA) 200-25 MCG/INH inhaler; Inhale 1 puff into the lungs daily  - methylPREDNISolone (MEDROL DOSEPAK) 4 MG tablet therapy pack; Follow Package Directions  - CBC with platelets; Future  - CBC with platelets    Hypertension goal BP (blood pressure) < 140/90  Good control  - Comprehensive metabolic panel (BMP + Alb, Alk Phos, ALT, AST, Total. Bili, TP); Future  - Lipid panel reflex to direct LDL Fasting; Future  - Comprehensive metabolic panel (BMP + Alb, Alk Phos, ALT, AST, Total. Bili, TP)  - Lipid panel reflex to direct LDL Fasting    Female genital symptoms  Mild menopausal S/S discussed.  Trial of medications and F/U with OB/GYN  - venlafaxine (EFFEXOR-XR) 37.5 MG 24 hr capsule; Take 1 capsule (37.5 mg) by mouth daily  - Ob/Gyn Referral; Future    HONEY (generalized anxiety disorder)    Menopausal syndrome (hot flashes)  - venlafaxine (EFFEXOR-XR) 37.5 MG 24 hr capsule; Take 1 capsule (37.5 mg) by mouth daily  - Ob/Gyn Referral; Future    Encounter for screening mammogram for breast cancer  - MA SCREENING DIGITAL BILAT - Future  (s+30); Future    Malaise and fatigue  Labs related to this being done today.  Follow-up as needed.  - Vitamin D Deficiency; Future  - TSH with free T4 reflex; Future  - Comprehensive metabolic panel (BMP + Alb, Alk Phos, ALT, AST, Total. Bili, TP); Future  - Rheumatoid factor; Future  - Anti Nuclear Dori IgG by IFA with Reflex; Future  - Lupus Anticoagulant Panel; Future  - Vitamin D Deficiency  - TSH with free T4 reflex  - Comprehensive metabolic panel (BMP + Alb, Alk Phos, ALT, AST, Total. Bili, TP)  - Rheumatoid factor  -  "Anti Nuclear Dori IgG by IFA with Reflex  - Lupus Anticoagulant Panel    Body aches  See above  - Vitamin D Deficiency; Future  - TSH with free T4 reflex; Future  - Comprehensive metabolic panel (BMP + Alb, Alk Phos, ALT, AST, Total. Bili, TP); Future  - Rheumatoid factor; Future  - Anti Nuclear Dori IgG by IFA with Reflex; Future  - Lupus Anticoagulant Panel; Future  - Vitamin D Deficiency  - TSH with free T4 reflex  - Comprehensive metabolic panel (BMP + Alb, Alk Phos, ALT, AST, Total. Bili, TP)  - Rheumatoid factor  - Anti Nuclear Dori IgG by IFA with Reflex  - Lupus Anticoagulant Panel    Anxiety state  - propranolol (INDERAL) 20 MG tablet; Take 1 tablet (20 mg) by mouth 2 times daily    ETOH abuse - history  - naltrexone (DEPADE/REVIA) 50 MG tablet; Take 1 tablet (50 mg) by mouth daily    High priority for 2019-nCoV vaccine  - COVID-19,PF,MODERNA (18+ Yrs BOOSTER .25mL)    Alcohol use disorder, moderate, in early remission (H)  Need for hepatitis C screening test  No new concerns with this.  She is still sober at this point time.  Declines hepatitis C screening.     BMI:   Estimated body mass index is 32.27 kg/m  as calculated from the following:    Height as of this encounter: 1.689 m (5' 6.5\").    Weight as of this encounter: 92.1 kg (203 lb).   Weight management plan: Discussed healthy diet and exercise guidelines  Work on weight loss  Regular exercise  No follow-ups on file.    CHARLEY Mendoza [facility] is a 51 year old who presents for the following health issues   History of Present Illness     Asthma:  She presents for follow up of asthma.  She has no cough, some wheezing, and some shortness of breath. She is using a relief medication 2-3 times per day. She does not miss any doses of her controller medication throughout the week.Patient is aware of the following triggers: unaware of any triggers. The patient has not had a visit to the Emergency Room, " "Urgent Care or Hospital due to asthma since the last clinic visit.     She eats 0-1 servings of fruits and vegetables daily.She consumes 1 sweetened beverage(s) daily.She exercises with enough effort to increase her heart rate 9 or less minutes per day.  She exercises with enough effort to increase her heart rate 3 or less days per week.   She is taking medications regularly.     Review of Systems   Constitutional, HEENT, cardiovascular, pulmonary, GI, , musculoskeletal, neuro, skin, endocrine and psych systems are negative, except as otherwise noted.      Objective    /72   Pulse 68   Temp 97.2  F (36.2  C)   Resp 16   Ht 1.689 m (5' 6.5\")   Wt 92.1 kg (203 lb)   SpO2 96%   BMI 32.27 kg/m    Body mass index is 32.27 kg/m .  Physical Exam   GENERAL: healthy, alert and no distress  NECK: no adenopathy, no asymmetry, masses, or scars and thyroid normal to palpation  RESP: lungs clear to auscultation - no rales, rhonchi or wheezes  CV: regular rate and rhythm, normal S1 S2, no S3 or S4, no murmur, click or rub, no peripheral edema and peripheral pulses strong  ABDOMEN: soft, nontender, no hepatosplenomegaly, no masses and bowel sounds normal  MS: no gross musculoskeletal defects noted, no edema  SKIN: no suspicious lesions or rashes to exposed skin today.  NEURO: Normal strength and tone, mentation intact and speech normal  PSYCH: mentation appears normal, affect normal/bright    Results for orders placed or performed in visit on 11/15/21 (from the past 24 hour(s))   CBC with platelets   Result Value Ref Range    WBC Count 7.0 4.0 - 11.0 10e3/uL    RBC Count 4.84 3.80 - 5.20 10e6/uL    Hemoglobin 14.9 11.7 - 15.7 g/dL    Hematocrit 42.9 35.0 - 47.0 %    MCV 89 78 - 100 fL    MCH 30.8 26.5 - 33.0 pg    MCHC 34.7 31.5 - 36.5 g/dL    RDW 12.8 10.0 - 15.0 %    Platelet Count 251 150 - 450 10e3/uL             "

## 2021-11-15 ENCOUNTER — OFFICE VISIT (OUTPATIENT)
Dept: FAMILY MEDICINE | Facility: OTHER | Age: 51
End: 2021-11-15
Payer: COMMERCIAL

## 2021-11-15 VITALS
WEIGHT: 203 LBS | BODY MASS INDEX: 31.86 KG/M2 | OXYGEN SATURATION: 96 % | HEIGHT: 67 IN | DIASTOLIC BLOOD PRESSURE: 72 MMHG | TEMPERATURE: 97.2 F | HEART RATE: 68 BPM | RESPIRATION RATE: 16 BRPM | SYSTOLIC BLOOD PRESSURE: 132 MMHG

## 2021-11-15 DIAGNOSIS — F10.10 ETOH ABUSE: ICD-10-CM

## 2021-11-15 DIAGNOSIS — N95.1 MENOPAUSAL SYNDROME (HOT FLASHES): ICD-10-CM

## 2021-11-15 DIAGNOSIS — Z11.59 NEED FOR HEPATITIS C SCREENING TEST: ICD-10-CM

## 2021-11-15 DIAGNOSIS — Z12.31 ENCOUNTER FOR SCREENING MAMMOGRAM FOR BREAST CANCER: ICD-10-CM

## 2021-11-15 DIAGNOSIS — F10.21 ALCOHOL USE DISORDER, MODERATE, IN EARLY REMISSION (H): ICD-10-CM

## 2021-11-15 DIAGNOSIS — R53.81 MALAISE AND FATIGUE: ICD-10-CM

## 2021-11-15 DIAGNOSIS — J45.30 MILD PERSISTENT ASTHMA WITHOUT COMPLICATION: ICD-10-CM

## 2021-11-15 DIAGNOSIS — F41.1 GAD (GENERALIZED ANXIETY DISORDER): ICD-10-CM

## 2021-11-15 DIAGNOSIS — F33.0 MAJOR DEPRESSIVE DISORDER, RECURRENT EPISODE, MILD (H): Primary | ICD-10-CM

## 2021-11-15 DIAGNOSIS — R53.83 MALAISE AND FATIGUE: ICD-10-CM

## 2021-11-15 DIAGNOSIS — Z23 HIGH PRIORITY FOR 2019-NCOV VACCINE: ICD-10-CM

## 2021-11-15 DIAGNOSIS — N94.9 FEMALE GENITAL SYMPTOMS: ICD-10-CM

## 2021-11-15 DIAGNOSIS — R52 BODY ACHES: ICD-10-CM

## 2021-11-15 DIAGNOSIS — F41.1 ANXIETY STATE: ICD-10-CM

## 2021-11-15 DIAGNOSIS — I10 HYPERTENSION GOAL BP (BLOOD PRESSURE) < 140/90: ICD-10-CM

## 2021-11-15 DIAGNOSIS — E66.01 MORBID OBESITY (H): ICD-10-CM

## 2021-11-15 LAB
ALBUMIN SERPL-MCNC: 3.9 G/DL (ref 3.4–5)
ALP SERPL-CCNC: 91 U/L (ref 40–150)
ALT SERPL W P-5'-P-CCNC: 24 U/L (ref 0–50)
ANION GAP SERPL CALCULATED.3IONS-SCNC: 3 MMOL/L (ref 3–14)
AST SERPL W P-5'-P-CCNC: 17 U/L (ref 0–45)
BILIRUB SERPL-MCNC: 0.3 MG/DL (ref 0.2–1.3)
BUN SERPL-MCNC: 11 MG/DL (ref 7–30)
CALCIUM SERPL-MCNC: 8.4 MG/DL (ref 8.5–10.1)
CHLORIDE BLD-SCNC: 111 MMOL/L (ref 94–109)
CHOLEST SERPL-MCNC: 238 MG/DL
CO2 SERPL-SCNC: 27 MMOL/L (ref 20–32)
CREAT SERPL-MCNC: 0.7 MG/DL (ref 0.52–1.04)
ERYTHROCYTE [DISTWIDTH] IN BLOOD BY AUTOMATED COUNT: 12.8 % (ref 10–15)
FASTING STATUS PATIENT QL REPORTED: NO
GFR SERPL CREATININE-BSD FRML MDRD: >90 ML/MIN/1.73M2
GLUCOSE BLD-MCNC: 98 MG/DL (ref 70–99)
HCT VFR BLD AUTO: 42.9 % (ref 35–47)
HDLC SERPL-MCNC: 65 MG/DL
HGB BLD-MCNC: 14.9 G/DL (ref 11.7–15.7)
LDLC SERPL CALC-MCNC: 98 MG/DL
MCH RBC QN AUTO: 30.8 PG (ref 26.5–33)
MCHC RBC AUTO-ENTMCNC: 34.7 G/DL (ref 31.5–36.5)
MCV RBC AUTO: 89 FL (ref 78–100)
NONHDLC SERPL-MCNC: 173 MG/DL
PLATELET # BLD AUTO: 251 10E3/UL (ref 150–450)
POTASSIUM BLD-SCNC: 4.4 MMOL/L (ref 3.4–5.3)
PROT SERPL-MCNC: 7.6 G/DL (ref 6.8–8.8)
RBC # BLD AUTO: 4.84 10E6/UL (ref 3.8–5.2)
SODIUM SERPL-SCNC: 141 MMOL/L (ref 133–144)
TRIGL SERPL-MCNC: 373 MG/DL
TSH SERPL DL<=0.005 MIU/L-ACNC: 2.33 MU/L (ref 0.4–4)
WBC # BLD AUTO: 7 10E3/UL (ref 4–11)

## 2021-11-15 PROCEDURE — 85390 FIBRINOLYSINS SCREEN I&R: CPT | Performed by: PATHOLOGY

## 2021-11-15 PROCEDURE — 80061 LIPID PANEL: CPT | Performed by: PHYSICIAN ASSISTANT

## 2021-11-15 PROCEDURE — 82306 VITAMIN D 25 HYDROXY: CPT | Performed by: PHYSICIAN ASSISTANT

## 2021-11-15 PROCEDURE — 86431 RHEUMATOID FACTOR QUANT: CPT | Performed by: PHYSICIAN ASSISTANT

## 2021-11-15 PROCEDURE — 86038 ANTINUCLEAR ANTIBODIES: CPT | Performed by: PHYSICIAN ASSISTANT

## 2021-11-15 PROCEDURE — 36415 COLL VENOUS BLD VENIPUNCTURE: CPT | Performed by: PHYSICIAN ASSISTANT

## 2021-11-15 PROCEDURE — 85730 THROMBOPLASTIN TIME PARTIAL: CPT | Performed by: PHYSICIAN ASSISTANT

## 2021-11-15 PROCEDURE — 84443 ASSAY THYROID STIM HORMONE: CPT | Performed by: PHYSICIAN ASSISTANT

## 2021-11-15 PROCEDURE — 91306 COVID-19,PF,MODERNA (18+ YRS BOOSTER .25ML): CPT | Performed by: PHYSICIAN ASSISTANT

## 2021-11-15 PROCEDURE — 80053 COMPREHEN METABOLIC PANEL: CPT | Performed by: PHYSICIAN ASSISTANT

## 2021-11-15 PROCEDURE — 85027 COMPLETE CBC AUTOMATED: CPT | Performed by: PHYSICIAN ASSISTANT

## 2021-11-15 PROCEDURE — 0064A COVID-19,PF,MODERNA (18+ YRS BOOSTER .25ML): CPT | Performed by: PHYSICIAN ASSISTANT

## 2021-11-15 PROCEDURE — 85613 RUSSELL VIPER VENOM DILUTED: CPT | Performed by: PHYSICIAN ASSISTANT

## 2021-11-15 PROCEDURE — 99214 OFFICE O/P EST MOD 30 MIN: CPT | Mod: 25 | Performed by: PHYSICIAN ASSISTANT

## 2021-11-15 RX ORDER — NALTREXONE HYDROCHLORIDE 50 MG/1
50 TABLET, FILM COATED ORAL DAILY
Qty: 90 TABLET | Refills: 3 | Status: SHIPPED | OUTPATIENT
Start: 2021-11-15 | End: 2022-11-27

## 2021-11-15 RX ORDER — FERROUS GLUCONATE 324(38)MG
324 TABLET ORAL
COMMUNITY
End: 2024-03-11

## 2021-11-15 RX ORDER — VENLAFAXINE HYDROCHLORIDE 37.5 MG/1
37.5 CAPSULE, EXTENDED RELEASE ORAL DAILY
Qty: 90 CAPSULE | Refills: 1 | Status: SHIPPED | OUTPATIENT
Start: 2021-11-15 | End: 2022-05-12

## 2021-11-15 RX ORDER — PROPRANOLOL HYDROCHLORIDE 20 MG/1
20 TABLET ORAL 2 TIMES DAILY
Qty: 180 TABLET | Refills: 3 | Status: SHIPPED | OUTPATIENT
Start: 2021-11-15 | End: 2022-12-27

## 2021-11-15 RX ORDER — METHYLPREDNISOLONE 4 MG
TABLET, DOSE PACK ORAL
Qty: 21 TABLET | Refills: 0 | Status: SHIPPED | OUTPATIENT
Start: 2021-11-15 | End: 2022-11-14

## 2021-11-15 ASSESSMENT — MIFFLIN-ST. JEOR: SCORE: 1560.49

## 2021-11-15 ASSESSMENT — PAIN SCALES - GENERAL: PAINLEVEL: NO PAIN (0)

## 2021-11-16 ENCOUNTER — TELEPHONE (OUTPATIENT)
Dept: FAMILY MEDICINE | Facility: OTHER | Age: 51
End: 2021-11-16
Payer: COMMERCIAL

## 2021-11-16 LAB
DEPRECATED CALCIDIOL+CALCIFEROL SERPL-MC: 64 UG/L (ref 20–75)
DRVVT SCREEN RATIO: 1.04
INR PPP: 0.94 (ref 0.85–1.15)
LA PPP-IMP: NEGATIVE
LUPUS INTERPRETATION: NORMAL
PTT RATIO: 0.97
RHEUMATOID FACT SER NEPH-ACNC: <7 IU/ML
THROMBIN TIME: 16.9 SECONDS (ref 13–19)

## 2021-11-16 ASSESSMENT — ASTHMA QUESTIONNAIRES: ACT_TOTALSCORE: 9

## 2021-11-16 NOTE — TELEPHONE ENCOUNTER
Prior Authorization Retail Medication Request    Medication/Dose: fluticasone-vilanterol (BREO ELLIPTA) 200-25 MCG/INH inhaler  ICD code (if different than what is on RX):    Previously Tried and Failed:    Rationale:      Insurance Name:    Insurance ID:        Pharmacy Information (if different than what is on RX)  Name:    Phone:

## 2021-11-16 NOTE — TELEPHONE ENCOUNTER
Central Prior Authorization Team  Phone: [phone]    PA Initiation    Medication: fluticasone-vilanterol (BREO ELLIPTA) 200-25 MCG/INH inhaler  Insurance Company: Blue Plus [facility] - Phone [phone] Fax [phone]  Pharmacy Filling the Rx: [id number] - [place], [place] - [address]   Filling Pharmacy Phone: [phone]  Filling Pharmacy Fax:    Start Date: 11/16/2021

## 2021-11-17 LAB — ANA SER QL IF: NEGATIVE

## 2021-11-17 NOTE — TELEPHONE ENCOUNTER
Prior Authorization Approval    Authorization Effective Date: 8/17/2021  Authorization Expiration Date: 11/17/2022  Medication: fluticasone-vilanterol (BREO ELLIPTA) 200-25 MCG/INH inhaler-APPROVED  Approved Dose/Quantity:    Reference #:     Insurance Company: [employer] - Phone [phone] Fax [phone]  Expected CoPay:       CoPay Card Available:      Foundation Assistance Needed:    Which Pharmacy is filling the prescription (Not needed for infusion/clinic administered): [id number] - [place], [place] - [address] [place] [address]  Pharmacy Notified: Yes  Patient Notified: Yes  **Instructed pharmacy to notify patient when script is ready to /ship.**

## 2021-11-17 NOTE — RESULT ENCOUNTER NOTE
Ms. David  I am resulting your labs as your provider is out of office.  Negative lupus test.    If you have any questions or concerns please contact me via My Chart or call the clinic at [phone]     Thank You  Zamzam Webster MD.

## 2021-11-22 ENCOUNTER — FCC EXTENDED DOCUMENTATION (OUTPATIENT)
Dept: PSYCHOLOGY | Facility: CLINIC | Age: 51
End: 2021-11-22
Payer: COMMERCIAL
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
## 2021-12-05 DIAGNOSIS — F33.0 MAJOR DEPRESSIVE DISORDER, RECURRENT EPISODE, MILD (H): ICD-10-CM

## 2021-12-05 DIAGNOSIS — F41.1 GAD (GENERALIZED ANXIETY DISORDER): ICD-10-CM

## 2021-12-06 RX ORDER — DULOXETIN HYDROCHLORIDE 60 MG/1
CAPSULE, DELAYED RELEASE ORAL
Qty: 90 CAPSULE | Refills: 3 | Status: SHIPPED | OUTPATIENT
Start: 2021-12-06 | End: 2022-11-27

## 2021-12-18 ENCOUNTER — HEALTH MAINTENANCE LETTER (OUTPATIENT)
Age: 51
End: 2021-12-18

## 2022-02-12 ENCOUNTER — HEALTH MAINTENANCE LETTER (OUTPATIENT)
Age: 52
End: 2022-02-12

## 2022-05-12 DIAGNOSIS — N95.1 MENOPAUSAL SYNDROME (HOT FLASHES): ICD-10-CM

## 2022-05-12 DIAGNOSIS — N94.9 FEMALE GENITAL SYMPTOMS: ICD-10-CM

## 2022-05-12 RX ORDER — VENLAFAXINE HYDROCHLORIDE 37.5 MG/1
CAPSULE, EXTENDED RELEASE ORAL
Qty: 90 CAPSULE | Refills: 1 | Status: SHIPPED | OUTPATIENT
Start: 2022-05-12 | End: 2022-11-14

## 2022-06-01 NOTE — TELEPHONE ENCOUNTER
Routing refill request to provider for review/approval because:  ACT <20    TIM LongoriaN, RN  [facility]       [phone]

## 2022-08-25 ENCOUNTER — VIRTUAL VISIT (OUTPATIENT)
Dept: PSYCHOLOGY | Facility: CLINIC | Age: 52
End: 2022-08-25
Payer: COMMERCIAL

## 2022-08-25 DIAGNOSIS — F41.1 GAD (GENERALIZED ANXIETY DISORDER): ICD-10-CM

## 2022-08-25 DIAGNOSIS — F33.1 MAJOR DEPRESSIVE DISORDER, RECURRENT EPISODE, MODERATE (H): Primary | ICD-10-CM

## 2022-08-25 DIAGNOSIS — F10.21 ALCOHOL USE DISORDER, MODERATE, IN EARLY REMISSION (H): ICD-10-CM

## 2022-08-25 PROCEDURE — 90839 PSYTX CRISIS INITIAL 60 MIN: CPT | Mod: 95 | Performed by: MARRIAGE & FAMILY THERAPIST

## 2022-08-25 ASSESSMENT — PATIENT HEALTH QUESTIONNAIRE - PHQ9
SUM OF ALL RESPONSES TO PHQ QUESTIONS 1-9: 12
10. IF YOU CHECKED OFF ANY PROBLEMS, HOW DIFFICULT HAVE THESE PROBLEMS MADE IT FOR YOU TO DO YOUR WORK, TAKE CARE OF THINGS AT HOME, OR GET ALONG WITH OTHER PEOPLE: SOMEWHAT DIFFICULT
SUM OF ALL RESPONSES TO PHQ QUESTIONS 1-9: 12

## 2022-08-25 NOTE — PROGRESS NOTES
"Answers for HPI/ROS submitted by the patient on 2022  If you checked off any problems, how difficult have these problems made it for you to do your work, take care of things at home, or get along with other people?: Somewhat difficult  PHQ9 TOTAL SCORE: 12        [facility]   Mental Health & Addiction Services     Progress Note - Initial Visit    Patient  Name:  Kesha David Date: 22           Service Type: Individual     Visit Start Time: 3 PM  visit End Time: 3:30 PM    Visit #: 1    Attendees: Client attended alone    Service Modality:  Phone Visit:      Provider verified identity through the following two step process.  Patient provided:  Patient  and Patient was verified at admission/transfer    The patient has been notified of the following:      \"We have found that certain health care needs can be provided without the need for a face to face visit.  This service lets us provide the care you need with a phone conversation.       I will have full access to your [facility] medical record during this entire phone call.   I will be taking notes for your medical record.      Since this is like an office visit, we will bill your insurance company for this service.       There are potential benefits and risks of telephone visits (e.g. limits to patient confidentiality) that differ from in-person visits.?Confidentiality still applies for telephone services, and nobody will record the visit.  It is important to be in a quiet, private space that is free of distractions (including cell phone or other devices) during the visit.??      If during the course of the call I believe a telephone visit is not appropriate, you will not be charged for this service\"     Consent has been obtained for this service by care team member: Yes        DATA:   Interactive Complexity: No   Crisis: Yes, visit entailed Crisis Management / Stabilization requiring urgent assessment and history of the " crisis state, mental status exam and disposition     Presenting Concerns/  Current Stressors:   Client presented as a 52-year-old   cisgender heterosexual female from [place], [place] addressing major depressive disorder recurrent moderate, generalized anxiety disorder, and alcohol use disorder, in early remission.  She stated her 's ex-girlfriend and former affair partner had started dating her brother-in-law, creating significant difficulties with stress management as well as mood management symptoms.  She confirmed previous history of suicidal ideation and attempt in 2020.  She indicated also that she had various treatments that were inpatient in 2012 and 2014, indicating she had a long history of depression as well as alcohol dependence.  She indicated that she currently feels significant distress, and has been ruminating about her past trauma of being cheated on and having the same woman infiltrate her support system.  She states that she has no control, and feels anger towards them.  She states that she has fantasies of the relationship between her brother-in-law and the woman fail and that they may end up unhappy.  She states that she has no intention, plan, or attempt at harming either party.  She states that she has a history of self sabotaging behaviors, and is having a difficult time moving beyond her current anger.  She responded to rapid assessment positively indicating that she feels safe towards herself and towards others, but agreed verbally to no harm contract.  Specificity of plan will increase upon next session.  She stated she was not able to engage in the session earlier, as he various questionnaires prevented her from joining on time.  She confirmed previous counseling through [facility] from 2020 June 2021.  She agreed during session to return to session within a week, and to use grounding techniques as well as other personal coping strategies learned through DBT in the  past outpatient and intensive outpatient programs.      ASSESSMENT:  Mental Status Assessment:  Appearance:   Undetermined-phone call   Eye Contact:   Undetermined-phone call   Psychomotor Behavior: Undetermined-phone call    Attitude:   Cooperative   Orientation:   Person Place Time Situation  Speech   Rate / Production: Pressured    Volume:  Normal   Mood:    Angry  Anxious  Depressed   Affect:    Labile  Worrisome   Thought Content:  Rumination   Thought Form:  Obsessive   Insight:    Fair       Safety Issues and Plan for Safety and Risk Management:     Ralph Suicide Severity Rating Scale (Lifetime/Recent)  Ralph Suicide Severity Rating (Lifetime/Recent) 8/6/2020 8/18/2020   Wish to be Dead (Lifetime) - Yes   Comments - after relapse/divorce threat in late July 2020   Non-Specific Active Suicidal Thoughts (Lifetime) - Yes   Non-Specific Active Suicidal Thought Description (Lifetime) - Hopelessness/depression - passive   Most Severe Ideation Rating (Lifetime) - 3   Most Severe Ideation Description (Lifetime) - Overdosed on alcohol, SSRIs and a few opioids 7/29/20   Frequency (Lifetime) - 4   Duration (Lifetime) - 4   Controllability (Lifetime) - 5   Protective Factors  (Lifetime) - 3   Reasons for Ideation (Lifetime) - 3   Q1 Wished to be Dead (Past Month) yes -   Q2 Suicidal Thoughts (Past Month) yes -   Q3 Suicidal Thought Method yes -   Q4 Suicidal Intent without Specific Plan no -   Q5 Suicide Intent with Specific Plan yes -   Q6 Suicide Behavior (Lifetime) yes -   RETIRED: 1. Wish to be Dead (Recent) - Yes   RETIRED: Wish to be Dead Description (Recent) - after relapse/divorce threat in late July 2020   RETIRED: 2. Non-Specific Active Suicidal Thoughts (Recent) - Yes   Non-Specific Active Suicidal Thought Description (Recent) - Recent suicide attempt on 7/29/20   3. Active Suicidal Ideation with any Methods (Not Plan) Without Intent to Act (Lifetime) - No   RETIRED: 3. Active Suicidal Ideation with any  Methods (Not Plan) Without Intent to Act (Recent) - No   RETIRE: 4. Active Suicidal Ideation with Some Intent to Act, Without Specific Plan (Lifetime) - No   4. Active Suicidal Ideation with Some Intent to Act, Without Specific Plan (Recent) - No   RETIRE: 5. Active Suicidal Ideation with Specific Plan and Intent (Lifetime) - Yes   Active Suicidal Ideation with Specific Plan and Intent Description (Lifetime) - Attempt 7/29/20   RETIRED: 5. Active Suicidal Ideation with Specific Plan and Intent (Recent) - Yes   RETIRED: Active Suicidal Ideation with Specific Plan and Intent Description (Recent) - Attempt 7/29/20   Most Severe Ideation Rating (Past Month) - 3   Most Severe Ideation Description (Past Month) - Overdosed on alcohol, SSRIs and a few opioids 7/29/20   Frequency (Past Month) - 4   Duration (Past Month) - 4   Controllability (Past Month) - 5   Protective Factors (Past Month) - 3   Reasons for Ideation (Past Month) - 3   Actual Attempt (Lifetime) - Yes   Actual Attempt Description (Lifetime) - 7/29/20 after relapsing on alcohol   Total Number of Actual Attempts (Lifetime) - 3   Comments - 2012, 2014 & 2020 all when drinking   Actual Attempt (Past 3 Months) - Yes   Actual Attempt Description (Past 3 Months) - 7/29/20 after relapsing on alcohol   Total Number of Actual Attempts (Past 3 Months) - 1   Has subject engaged in non-suicidal self-injurious behavior? (Lifetime) - Yes   Comments - Cutting as a teen   Has subject engaged in non-suicidal self-injurious behavior? (Past 3 Months) - No   Interrupted Attempts (Lifetime) - No   Interrupted Attempts (Past 3 Months) - No   Aborted or Self-Interrupted Attempt (Lifetime) - No   Aborted or Self-Interrupted Attempt (Past 3 Months) - No   Preparatory Acts or Behavior (Lifetime) - No   Preparatory Acts or Behavior (Past 3 Months) - No   Most Recent Attempt Date - 11866   Most Recent Attempt Actual Lethality Code - 3   Most Lethal Attempt Actual Lethality Code - 3  "  Initial/First Attempt Date - 92023   Initial/First Attempt Actual Lethality Code - 3     Patient denies current fears or concerns for personal safety.  Patient reports the following current or recent suicidal ideation or behaviors: Suicidal ideation has been recurring in her past, current ideation is nonspecific, and with no intention, plan, urge, or attempt since 2020.  She has had attempts in the past..  Patient reports current or recent homicidal ideation or behaviors including She has fantasies of her brother-in-law's girlfriend having disappointment or \"bad things happening\" but no urge, plan, intent, action towards harm of her nor homicidal ideation.  Patient denies current or recent self injurious behavior or ideation.  Patient denies other safety concerns.  A safety and risk management plan has been developed including: Patient has no change in safety concerns. Committed to safety and agreed to follow previously developed safety plan..  Patient consented to co-developed safety plan.  Safety and risk management plan was completed.  Patient agreed to use safety plan should any safety concerns arise.  A copy was given to the patient.  Patient consented to co-developed safety plan on 8/25/2022.  Safety and risk management plan was reviewed.   Patient agreed to use safety plan should any safety concerns arise.  A copy was made available to the patient.  Patient did not answer a question if there were firearms present in the home..     Diagnostic Criteria:  Generalized Anxiety Disorder  A. Excessive anxiety and worry about a number of events or activities (such as work or school performance).   B. The person finds it difficult to control the worry.  C. Select 3 or more symptoms (required for diagnosis). Only one item is required in children.   - Restlessness or feeling keyed up or on edge.    - Being easily fatigued.    - Difficulty concentrating or mind going blank.    - Irritability.    - Muscle tension.    - " Sleep disturbance (difficulty falling or staying asleep, or restless unsatisfying sleep).   D. The focus of the anxiety and worry is not confined to features of an Axis I disorder.  E. The anxiety, worry, or physical symptoms cause clinically significant distress or impairment in social, occupational, or other important areas of functioning.   F. The disturbance is not due to the direct physiological effects of a substance (e.g., a drug of abuse, a medication) or a general medical condition (e.g., hyperthyroidism) and does not occur exclusively during a Mood Disorder, a Psychotic Disorder, or a Pervasive Developmental Disorder.    - The aformentioned symptoms began To be determined year(s) ago and occurs 7 days per week and is experienced as moderate.  Major Depressive Disorder  A) Recurrent episode(s) - symptoms have been present during the same 2-week period and represent a change from previous functioning 5 or more symptoms (required for diagnosis)   - Depressed mood. Note: In children and adolescents, can be irritable mood.     - Diminished interest or pleasure in all, or almost all, activities.    - Decreased sleep.    - Psychomotor activity agitation.    - Fatigue or loss of energy.   Alcohol Use Disorder, - moderate. Criteria met includes: Previous history of chronic alcohol dependence using excessive amounts, using beyond set limits, difficulties experienced 2 DWIs, etc.      DSM5 Diagnoses: (Sustained by DSM5 Criteria Listed Above)  Diagnoses: 296.32 (F33.1) Major Depressive Disorder, Recurrent Episode, Moderate With anxious distress  300.02 (F41.1) Generalized Anxiety Disorder  Substance-Related & Addictive Disorders Alcohol Use Disorder   303.90 (F10.20) Moderate In early remission,   Psychosocial & Contextual Factors: Past hospitalizations for substance misuse and mental health crises, current conflict with marital partner, support system conflicts  WHODAS 2.0 (12 item):   WHODAS 2.0 Total Score 8/6/2020    Total Score 37   Total Score [facility] 37     Intervention:   DBT- Patient was educated on distress tolerance skills Temporary acceptance, and radical acceptance techniques  Collateral Reports Completed:  Not Applicable      PLAN: (Homework, other):  1. Provider will continue Diagnostic Assessment.  Patient was given the following to do until next session: Create space to have mindful awareness and relaxation, and to allow herself the ability to exit a destabilizing environment when needed.    2. Provider recommended the following referrals: Continued individual psychotherapy and was placed on the wait list given weekly need for follow-up visits..      3.  Suicide Risk and Safety Concerns were assessed for Kesha David.    Patient meets the following risk assessment and triage:   Moderate Risk is identified when the patient has one of the following:  Suicidal behavior more than three months ago ( C-SSRS Suicidal Behavior Lifetime)    The following has been recommended:  Complete/Review/Update Safety Plan and Per clinical judgment, provider is making the following recommendations Utilize 911 or emergency services if she feels escalated risk of self-harm, suicide ideation, or homicidal ideation    Safety Plan: To be completed upon next session as client did not complete fully.  Adult Short Safety Plan:   Name: Kesha David  YOB: 1970  Date: August 25, 2022   My primary care provider: Tanvir Peterson  My primary care clinic: [facility]  My prescriber: Dr. Tanvir Benavidez  Other care team support: JESSICA Schwartz   My Triggers:  Relationship conflict With extended family including brother-in-law's partner, Home environment Disruption due to, Medical Health Escalating rumination and Substance Use Relapse if occurring     Additional People, Places, and Things that I can access for support: Spouse, friends, clinical provider         What is important to me and makes life worth living: To be  determined.         GREEN    Good Control  1. I feel good  2. No suicidal thoughts   3. Can work, sleep and play      Action Steps  1. Self-care: balanced meals, exercising, sleep practices, etc.  2. Take your medications as prescribed.  3. Continue meetings with therapist and prescriber.  4.  Do the healthy things that I enjoy.  5.  Stay active           YELLOW  Getting Worse  I have ANY of these:  1. I do not feel good  2. Difficulty Concentrating  3. Sleep is changing  4. Increase/Change in my thoughts to hurt self and/or others, but I can still manage and not act on it.   5. Not taking care of self.  6.  Feeling triggered             Action Steps (in addition to the above):  1. Inform your therapist and psychiatric prescriber/PCP.  2. Keep taking your medications as prescribed.    3. Turn to people you can ask for help.  4. Use internal coping strategies -see below.  5. Create safe environment: store firearms for safety, lock and limit medications and notify friends/family of increase in symptoms  6.  Communicate with supportive natural supports in your environment.           RED  Get Help  If I have ANY of these:  1. Current and uncontrollable thoughts and/or behaviors to hurt self and/or others.   2.  Current and/or uncontrollable thoughts to use substances   Actions to manage my safety  1. Contact your emergency person partner/spouse  2. Call or Text [phone]  3. Call my crisis team- [facility] [phone] [place]  3. Or Call 911 or go to the emergency room right away  4.  Call crisis hotline        My Internal Coping Strategies include the following:  take a bath, belly breathing, arts and crafts, color, fidget toys, use my coping box, exercise and use my coping skills    [End for Brief Safety Plan]     Safety Concerns  How To Identify Situations That Make Your Mental Health Worse:  Triggers are things that make your mental health worse.  Look at the list below to help you find  your triggers and what you can do about them.     1. Identify Early Warning Signs:    Sometimes symptoms return, even when people do their best to stay well. Symptoms can develop over a short period of time with little or no warning, but most of the time they emerge gradually over several weeks.  Early warning signs are changes that people experience when a relapse is starting. Some early warning signs are common and others are not as common.   Common Early Warning Signs:    Feeling tense or nervous, Trouble sleeping -either too much or too little sleep, Feeling depressed or low, Feeling irritable, Trouble concentrating, Urges to harm self, Urges to harm others and Urges to use drugs or alcohol     2. Identify action steps to take when warning signs are noticed:    Taking Action- It is important to take action if you are experiencing early warning signs of a relapse.  The faster you act, the more likely it is that you can avoid a full relapse.  It is helpful to identify several specific ways to cope with symptoms.      The following is my list of symptoms and coping strategies that I can use when they are present:    Symptom Coping Strategies   Anxiety -Talk with someone in your support system and let him or her know how you are feeling.  -Use relaxation techniques such as deep breathing or imagery.  -Use positive affirmations to counteract negative self-talk such as  I am learning to let go of worry.    Depression - Schedule your day; include activities you have to do and activities you enjoy doing.  - Get some exercise - walk, run, bike, or swim.  - Give yourself credit for even the smallest things you get done.   Sleep Difficulties   - Go to sleep at the same time every day.  - Do something relaxing before bed, such as drinking herbal tea or listening to music.  - Avoid having discussions about upsetting topics before going to bed.   Delusions   - Distract yourself from the disturbing thought by doing something  that requires your attention such as a puzzle.  - Check out your beliefs by talking to someone you trust.    Hallucinations   - Use headphones to listen to music.  - Tell voices to  stop  or say to yourself,  I am safe.   - Ignore the hallucinations as much as possible; focus on other things.   Concentration Difficulties - Minimize distractions so there is only one thing for you to focus on at a time.    - Ask the person you are having a conversation with to slow down or repeat things you are unsure of.        Cornelius Mckeon, [facility]  August 25, 2022

## 2022-09-08 ENCOUNTER — VIRTUAL VISIT (OUTPATIENT)
Dept: PSYCHOLOGY | Facility: CLINIC | Age: 52
End: 2022-09-08
Payer: COMMERCIAL

## 2022-09-08 DIAGNOSIS — F33.1 MAJOR DEPRESSIVE DISORDER, RECURRENT EPISODE, MODERATE (H): Primary | ICD-10-CM

## 2022-09-08 DIAGNOSIS — F41.1 GAD (GENERALIZED ANXIETY DISORDER): ICD-10-CM

## 2022-09-08 DIAGNOSIS — F10.21 ALCOHOL USE DISORDER, MODERATE, IN EARLY REMISSION (H): ICD-10-CM

## 2022-09-08 PROCEDURE — 90837 PSYTX W PT 60 MINUTES: CPT | Mod: 95 | Performed by: MARRIAGE & FAMILY THERAPIST

## 2022-09-08 NOTE — PROGRESS NOTES
Answers for HPI/ROS submitted by the patient on 2022  If you checked off any problems, how difficult have these problems made it for you to do your work, take care of things at home, or get along with other people?: Somewhat difficult  PHQ9 TOTAL SCORE: 12        [facility]   Mental Health & Addiction Services     Progress Note - Initial Visit    Patient  Name:  Kesha David Date: 22           Service Type: Individual     Visit Start Time: 8 AM  Visit End Time: 9 AM    Visit #: 2    Attendees: Client attended alone    Service Modality:  Video Visit:      Provider verified identity through the following two step process.  Patient provided:  Patient , Patient address and Patient was verified at admission/transfer    Telemedicine Visit: The patient's condition can be safely assessed and treated via synchronous audio and visual telemedicine encounter.      Reason for Telemedicine Visit: Patient has requested telehealth visit    Originating Site (Patient Location): Patient's home    Distant Site (Provider Location): [facility]    Consent:  The patient/guardian has verbally consented to: the potential risks and benefits of telemedicine (video visit) versus in person care; bill my insurance or make self-payment for services provided; and responsibility for payment of non-covered services.     Patient would like the video invitation sent by:  My Chart    Mode of Communication:  Video Conference via [employer]well    As the provider I attest to compliance with applicable laws and regulations related to telemedicine.     DATA:   Extended Session (53+ minutes): PROLONGED SERVICE IN THE OUTPATIENT SETTING REQUIRING DIRECT (FACE-TO-FACE) PATIENT CONTACT BEYOND THE USUAL SERVICE:    - Patient's presenting concerns require more intensive intervention than could be completed within the usual service     Interactive Complexity: No   Crisis: No     Presenting Concerns/  Current  "Stressors:    Progress Since Last Session (Related to Symptoms / Goals / Homework):   Symptoms: No change recent improvement with depressed mood, however, this is balanced with a recent relapse of drinking (leading to guilt and shame and family conflict).    Homework: Completed in session      Episode of Care Goals: No improvement - PREPARATION (Decided to change - considering how); Intervened by negotiating a change plan and determining options / strategies for behavior change, identifying triggers, exploring social supports, and working towards setting a date to begin behavior change     Current / Ongoing Stressors and Concerns:   Client presented as a 52-year-old   cisgender heterosexual female from [place], [place] addressing major depressive disorder recurrent moderate, generalized anxiety disorder, and alcohol use disorder, in early remission.      The client discussed suicidal ideation when she had a two day relapse of alcohol, when experiencing heightened stress. She stated she had frustrations with having to manage her reactions (stuffing them) because they were invalidated, or she was feeling private. She reported no specificity with SI, without plan, intention, urge, access, or attempt. She stated she agreed to follow safety plan.  She states that her triggers included her own internal thinking processes where she felt other people rejected her or did not protect her from this female that had cheated with the client's  in the past.  Her exposure to this female has increased because the client's  Rubio has a brother that is currently dating her and now living in the in-law\"s home.  She states she sees this female multiple times each day, and finds it challenging to deal with.  She has ruminating thoughts and has had hypervigilance of potential exposure to this woman.  She states that her  has strong reactions to her worries, and has been verbally insisting that she " "drop the issue.  She states that she feels \"gas lighting, because he can turn things quickly against me.\"  Therapist highlighted ways of channeling her thought processes to self soothing mechanisms when feeling threatened or triggered.  Therapist also normalized the relational effect of past trauma, and how it is currently being processed as potentially harming of her.  Therapist recommended that she consider the situation with developmentally appropriate mindset.  She stated that the cheating occurred when she was 15 years of age, and frequently has emotions that are similar to then when triggered.  Therapist also recommended that she give herself some self coaching mechanisms, encouraging her to tolerate some stress, with self soothing mechanisms building and when needing to reduce tension by talking about it that she may talk with her supportive friends.  She also was encouraged to communicate her needs assertively (versus keeping her needs passive and avoiding the conversation with her ).  She responded favorably, but was worried that her  might invalidate her requests for him to be protective of her.  Therapist recommended weekly sessions if possible.  She also was recommended to write down triggering thoughts and to use logic to challenge many of them when possible.     Treatment Objective(s) Addressed in This Session:   attend AA at least 1 times in the next week  use at least 5 coping skills for anxiety management in the next 10 weeks  Increase interest, engagement, and pleasure in doing things  Decrease frequency and intensity of feeling down, depressed, hopeless  Improve quantity and quality of night time sleep / decrease daytime naps  Feel less tired and more energy during the day   Improve diet, appetite, mindful eating, and / or meal planning  Identify negative self-talk and behaviors: challenge core beliefs, myths, and actions  Improve concentration, focus, and mindfulness in daily " activities   Feel less fidgety, restless or slow in daily activities / interpersonal interactions  Decrease thoughts that you'd be better off dead or of suicide / self-harm       Intervention:   Motivational Interviewing    MI Intervention: Expressed Empathy/Understanding, Supported Autonomy, Collaboration, Evocation, Permission to raise concern or advise, Open-ended questions, Reflections: simple and complex, Change talk (evoked) and Reframe     Change Talk Expressed by the Patient: Desire to change Ability to change Reasons to change Need to change Committment to change Activation Taking steps    Provider Response to Change Talk: E - Evoked more info from patient about behavior change, A - Affirmed patient's thoughts, decisions, or attempts at behavior change, R - Reflected patient's change talk and S - Summarized patient's change talk statements      Assessments completed prior to visit:  The following assessments were completed by patient for this visit:  PHQ9:   PHQ-9 SCORE 9/6/2019 8/6/2020 9/15/2020 11/13/2020 2/9/2021 5/14/2021 8/25/2022   PHQ-9 Total Score - - - - - - -   PHQ-9 Total Score MyChart - 19 (Moderately severe depression) - 4 (Minimal depression) - - 12 (Moderate depression)   PHQ-9 Total Score 5 19 10 4 10 5 12     GAD2:   HONEY-2 9/8/2022   Feeling nervous, anxious, or on edge 1   Not being able to stop or control worrying 1   HONEY-2 Total Score 2     CAGE-AID:   CAGE-AID Total Score 8/6/2020 8/18/2020   Total Score 3 4     PROMIS 10-Global Health (only subscores and total score):   PROMIS-10 Scores Only 9/8/2022   Global Mental Health Score 10   Global Physical Health Score 14   PROMIS TOTAL - SUBSCORES 24     [place] Suicide Severity Rating Scale (Lifetime/Recent)  [place] Suicide Severity Rating (Lifetime/Recent) 8/6/2020 8/18/2020   Wish to be Dead (Lifetime) - Yes   Comments - after relapse/divorce threat in late July 2020   Non-Specific Active Suicidal Thoughts (Lifetime) - Yes    Non-Specific Active Suicidal Thought Description (Lifetime) - Hopelessness/depression - passive   Most Severe Ideation Rating (Lifetime) - 3   Most Severe Ideation Description (Lifetime) - Overdosed on alcohol, SSRIs and a few opioids 7/29/20   Frequency (Lifetime) - 4   Duration (Lifetime) - 4   Controllability (Lifetime) - 5   Protective Factors  (Lifetime) - 3   Reasons for Ideation (Lifetime) - 3   Q1 Wished to be Dead (Past Month) yes -   Q2 Suicidal Thoughts (Past Month) yes -   Q3 Suicidal Thought Method yes -   Q4 Suicidal Intent without Specific Plan no -   Q5 Suicide Intent with Specific Plan yes -   Q6 Suicide Behavior (Lifetime) yes -   RETIRED: 1. Wish to be Dead (Recent) - Yes   RETIRED: Wish to be Dead Description (Recent) - after relapse/divorce threat in late July 2020   RETIRED: 2. Non-Specific Active Suicidal Thoughts (Recent) - Yes   Non-Specific Active Suicidal Thought Description (Recent) - Recent suicide attempt on 7/29/20   3. Active Suicidal Ideation with any Methods (Not Plan) Without Intent to Act (Lifetime) - No   RETIRED: 3. Active Suicidal Ideation with any Methods (Not Plan) Without Intent to Act (Recent) - No   RETIRE: 4. Active Suicidal Ideation with Some Intent to Act, Without Specific Plan (Lifetime) - No   4. Active Suicidal Ideation with Some Intent to Act, Without Specific Plan (Recent) - No   RETIRE: 5. Active Suicidal Ideation with Specific Plan and Intent (Lifetime) - Yes   Active Suicidal Ideation with Specific Plan and Intent Description (Lifetime) - Attempt 7/29/20   RETIRED: 5. Active Suicidal Ideation with Specific Plan and Intent (Recent) - Yes   RETIRED: Active Suicidal Ideation with Specific Plan and Intent Description (Recent) - Attempt 7/29/20   Most Severe Ideation Rating (Past Month) - 3   Most Severe Ideation Description (Past Month) - Overdosed on alcohol, SSRIs and a few opioids 7/29/20   Frequency (Past Month) - 4   Duration (Past Month) - 4  "  Controllability (Past Month) - 5   Protective Factors (Past Month) - 3   Reasons for Ideation (Past Month) - 3   Actual Attempt (Lifetime) - Yes   Actual Attempt Description (Lifetime) - 7/29/20 after relapsing on alcohol   Total Number of Actual Attempts (Lifetime) - 3   Comments - 2012, 2014 & 2020 all when drinking   Actual Attempt (Past 3 Months) - Yes   Actual Attempt Description (Past 3 Months) - 7/29/20 after relapsing on alcohol   Total Number of Actual Attempts (Past 3 Months) - 1   Has subject engaged in non-suicidal self-injurious behavior? (Lifetime) - Yes   Comments - Cutting as a teen   Has subject engaged in non-suicidal self-injurious behavior? (Past 3 Months) - No   Interrupted Attempts (Lifetime) - No   Interrupted Attempts (Past 3 Months) - No   Aborted or Self-Interrupted Attempt (Lifetime) - No   Aborted or Self-Interrupted Attempt (Past 3 Months) - No   Preparatory Acts or Behavior (Lifetime) - No   Preparatory Acts or Behavior (Past 3 Months) - No   Most Recent Attempt Date - 65589   Most Recent Attempt Actual Lethality Code - 3   Most Lethal Attempt Actual Lethality Code - 3   Initial/First Attempt Date - 65589   Initial/First Attempt Actual Lethality Code - 3         ASSESSMENT: Current Emotional / Mental Status (status of significant symptoms):   Risk status (Self / Other harm or suicidal ideation)   Patient denies current fears or concerns for personal safety.   Patient reports the following current or recent suicidal ideation or behaviors: passive suicidal ideation is intermittent during a typical week, without intention, plan, or attempt..   Patient reports current or recent homicidal ideation or behaviors including She has fantasies of her brother-in-law's girlfriend having disappointment or \"bad things happening\" but no urge, plan, intent, action towards harm of her nor homicidal ideation.   Patient denies current or recent self injurious behavior or ideation.   Patient denies other " safety concerns.   Patient reports there has been no change in risk factors since their last session.     Patient reports there has been no change in protective factors since their last session.     Recommended that patient call 911 or go to the local ED should there be a change in any of these risk factors.    A safety and risk management plan has been developed including: Patient has no change in safety concerns. Committed to safety and agreed to follow previously developed safety plan..  Patient consented to co-developed safety plan.  Safety and risk management plan was completed.  Patient agreed to use safety plan should any safety concerns arise.  A copy was given to the patient.  Patient consented to co-developed safety plan on 8/25/2022.  Safety and risk management plan was reviewed.   Patient agreed to use safety plan should any safety concerns arise.  A copy was made available to the patient.  Patient did not answer a question if there were firearms present in the home.       Appearance:   Appropriate    Eye Contact:   Good    Psychomotor Behavior: Normal    Attitude:   Cooperative  Pleasant   Orientation:   All   Speech    Rate / Production: Normal     Volume:  Normal    Mood:    Anxious  Depressed  Irritable  Agitated   Affect:    Appropriate    Thought Content:  Hypervigilent   Thought Form:  Coherent    Insight:    Good      Medication Review:   No changes to current psychiatric medication(s)     Medication Compliance:   Yes     Changes in Health Issues:   None reported     Chemical Use Review:   Substance Use: Problem use continues with no change since last session, Stage of Change: Action        Tobacco Use: No change in amount of tobacco use since last session.  Action    Diagnosis:  1. Major depressive disorder, recurrent episode, moderate (H)    2. HONEY (generalized anxiety disorder)    3. Alcohol use disorder, moderate, in early remission (H)        Collateral Reports Completed:   Not  Applicable    PLAN: (Patient Tasks / Therapist Tasks / Other)  Client will discuss her feelings and needs with her partner including her need for feeling defended or protected from the emotional threat she feels from the affair partner.  She agreed to also address tension reduction strategies, emotional coaching and logic replacements of distortion cognitions.  Client will maintain sobriety from substances and will also initiate attendance in online AA groups or local AA meetings when possible.  Therapist will continue addressing emotion regulation strategies.        Cornelius Mckeon, LMFT                                                           ______________________________________________________________________    Individual Treatment Plan    Patient's Name: Kesha David  YOB: 1970    Date of Creation: 9/8/2022  Date Treatment Plan Last Reviewed/Revised: 9/8/2022    DSM5 Diagnoses:  1. Major depressive disorder, recurrent episode, moderate (H)    2. HONEY (generalized anxiety disorder)    3. Alcohol use disorder, moderate, in early remission (H)         Psychosocial / Contextual Factors: Past hospitalizations for substance misuse and mental health crises, current conflict with marital partner, support system conflicts  PROMIS (reviewed every 90 days): See above    Referral / Collaboration:  Referral to another professional/service is not indicated at this time..    Anticipated number of session for this episode of care: 6-9 sessions  Anticipation frequency of session: Weekly  Anticipated Duration of each session: 38-52 minutes  Treatment plan will be reviewed in 90 days or when goals have been changed.       MeasurableTreatment Goal(s) related to diagnosis / functional impairment(s)  Goal 1: Patient will maintain sobriety, decrease depressive symptoms, and increase personal confidence and stress/emotion regulation techniques.    I will know I've met my goal when not reported.      Objective #A  (Patient Action)    Patient will attend AA at least 1 times in the next week  use at least 5 coping skills for anxiety management in the next 10 weeks  Increase interest, engagement, and pleasure in doing things  Decrease frequency and intensity of feeling down, depressed, hopeless  Improve quantity and quality of night time sleep / decrease daytime naps  Feel less tired and more energy during the day   Improve diet, appetite, mindful eating, and / or meal planning  Identify negative self-talk and behaviors: challenge core beliefs, myths, and actions  Improve concentration, focus, and mindfulness in daily activities   Feel less fidgety, restless or slow in daily activities / interpersonal interactions  Decrease thoughts that you'd be better off dead or of suicide / self-harm  Status: New - Date: 9/8/2022     Intervention(s)  Therapist will teach emotional regulation skills. Cognitive behavior therapy, emotion regulation strategies, and assertive communication will all be utilized to support treatment plan goals..    Patient has reviewed and agreed to the above plan.      JESSICA Rojas  September 8, 2022        Safety Plan: To be completed upon next session as client did not complete fully.  Adult Short Safety Plan:   Name: Kesha David  YOB: 1970  Date: August 25, 2022   My primary care provider: Tanvir Peterson  My primary care clinic: [facility]  My prescriber: Dr. Tanvir Benavidez  Other care team support: JESSICA Schwartz   My Triggers:  Relationship conflict With extended family including brother-in-law's partner, Home environment Disruption due to, Medical Health Escalating rumination and Substance Use Relapse if occurring     Additional People, Places, and Things that I can access for support: Spouse, friends, clinical provider         What is important to me and makes life worth living: To be determined.         GREEN    Good Control  1. I feel good  2. No suicidal thoughts    3. Can work, sleep and play      Action Steps  1. Self-care: balanced meals, exercising, sleep practices, etc.  2. Take your medications as prescribed.  3. Continue meetings with therapist and prescriber.  4.  Do the healthy things that I enjoy.  5.  Stay active           YELLOW  Getting Worse  I have ANY of these:  1. I do not feel good  2. Difficulty Concentrating  3. Sleep is changing  4. Increase/Change in my thoughts to hurt self and/or others, but I can still manage and not act on it.   5. Not taking care of self.  6.  Feeling triggered             Action Steps (in addition to the above):  1. Inform your therapist and psychiatric prescriber/PCP.  2. Keep taking your medications as prescribed.    3. Turn to people you can ask for help.  4. Use internal coping strategies -see below.  5. Create safe environment: store firearms for safety, lock and limit medications and notify friends/family of increase in symptoms  6.  Communicate with supportive natural supports in your environment.           RED  Get Help  If I have ANY of these:  1. Current and uncontrollable thoughts and/or behaviors to hurt self and/or others.   2.  Current and/or uncontrollable thoughts to use substances   Actions to manage my safety  1. Contact your emergency person partner/spouse  2. Call or Text [phone]  3. Call my crisis team- [facility] [phone] [facility]  3. Or Call 911 or go to the emergency room right away  4.  Call crisis hotline        My Internal Coping Strategies include the following:  take a bath, belly breathing, arts and crafts, color, fidget toys, use my coping box, exercise and use my coping skills    [End for Brief Safety Plan]     Safety Concerns  How To Identify Situations That Make Your Mental Health Worse:  Triggers are things that make your mental health worse.  Look at the list below to help you find your triggers and what you can do about them.     1. Identify Early Warning  Signs:    Sometimes symptoms return, even when people do their best to stay well. Symptoms can develop over a short period of time with little or no warning, but most of the time they emerge gradually over several weeks.  Early warning signs are changes that people experience when a relapse is starting. Some early warning signs are common and others are not as common.   Common Early Warning Signs:    Feeling tense or nervous, Trouble sleeping -either too much or too little sleep, Feeling depressed or low, Feeling irritable, Trouble concentrating, Urges to harm self, Urges to harm others and Urges to use drugs or alcohol     2. Identify action steps to take when warning signs are noticed:    Taking Action- It is important to take action if you are experiencing early warning signs of a relapse.  The faster you act, the more likely it is that you can avoid a full relapse.  It is helpful to identify several specific ways to cope with symptoms.      The following is my list of symptoms and coping strategies that I can use when they are present:    Symptom Coping Strategies   Anxiety -Talk with someone in your support system and let him or her know how you are feeling.  -Use relaxation techniques such as deep breathing or imagery.  -Use positive affirmations to counteract negative self-talk such as  I am learning to let go of worry.    Depression - Schedule your day; include activities you have to do and activities you enjoy doing.  - Get some exercise - walk, run, bike, or swim.  - Give yourself credit for even the smallest things you get done.   Sleep Difficulties   - Go to sleep at the same time every day.  - Do something relaxing before bed, such as drinking herbal tea or listening to music.  - Avoid having discussions about upsetting topics before going to bed.   Delusions   - Distract yourself from the disturbing thought by doing something that requires your attention such as a puzzle.  - Check out your beliefs by  talking to someone you trust.    Hallucinations   - Use headphones to listen to music.  - Tell voices to  stop  or say to yourself,  I am safe.   - Ignore the hallucinations as much as possible; focus on other things.   Concentration Difficulties - Minimize distractions so there is only one thing for you to focus on at a time.    - Ask the person you are having a conversation with to slow down or repeat things you are unsure of.        Cornelius Mckeon, [facility]  August 25, 2022

## 2022-09-12 ENCOUNTER — VIRTUAL VISIT (OUTPATIENT)
Dept: PSYCHOLOGY | Facility: CLINIC | Age: 52
End: 2022-09-12
Payer: COMMERCIAL

## 2022-09-12 DIAGNOSIS — F41.1 GAD (GENERALIZED ANXIETY DISORDER): ICD-10-CM

## 2022-09-12 DIAGNOSIS — F33.1 MAJOR DEPRESSIVE DISORDER, RECURRENT EPISODE, MODERATE (H): Primary | ICD-10-CM

## 2022-09-12 DIAGNOSIS — F10.21 ALCOHOL USE DISORDER, MODERATE, IN EARLY REMISSION (H): ICD-10-CM

## 2022-09-12 PROCEDURE — 90837 PSYTX W PT 60 MINUTES: CPT | Mod: 95 | Performed by: MARRIAGE & FAMILY THERAPIST

## 2022-09-12 NOTE — PROGRESS NOTES
Answers for HPI/ROS submitted by the patient on 2022  If you checked off any problems, how difficult have these problems made it for you to do your work, take care of things at home, or get along with other people?: Somewhat difficult  PHQ9 TOTAL SCORE: 12        [facility]   Mental Health & Addiction Services     Progress Note - Initial Visit    Patient  Name:  Kesha David Date: 22           Service Type: Individual     Visit Start Time: 11 AM  Visit End Time: 12 PM    Visit #: 3    Attendees: Client attended alone    Service Modality:  Video Visit:      Provider verified identity through the following two step process.  Patient provided:  Patient , Patient address and Patient was verified at admission/transfer    Telemedicine Visit: The patient's condition can be safely assessed and treated via synchronous audio and visual telemedicine encounter.      Reason for Telemedicine Visit: Patient has requested telehealth visit    Originating Site (Patient Location): Patient's home    Distant Site (Provider Location): [facility]    Consent:  The patient/guardian has verbally consented to: the potential risks and benefits of telemedicine (video visit) versus in person care; bill my insurance or make self-payment for services provided; and responsibility for payment of non-covered services.     Patient would like the video invitation sent by:  My Chart    Mode of Communication:  Video Conference via Amwell    As the provider I attest to compliance with applicable laws and regulations related to telemedicine.     DATA:   Extended Session (53+ minutes): PROLONGED SERVICE IN THE OUTPATIENT SETTING REQUIRING DIRECT (FACE-TO-FACE) PATIENT CONTACT BEYOND THE USUAL SERVICE:    - Patient's presenting concerns require more intensive intervention than could be completed within the usual service   Interactive Complexity: No   Crisis: No     Presenting Concerns/  Current  Stressors:    Progress Since Last Session (Related to Symptoms / Goals / Homework):   Symptoms: Improving recent improvement with depressed mood, early alcohol remission from Septemer 1-currently.     Homework: Completed in session      Episode of Care Goals: No improvement - PREPARATION (Decided to change - considering how); Intervened by negotiating a change plan and determining options / strategies for behavior change, identifying triggers, exploring social supports, and working towards setting a date to begin behavior change     Current / Ongoing Stressors and Concerns:   Client presented as a 52-year-old   cisgender heterosexual female from [place], [place] addressing major depressive disorder recurrent moderate, generalized anxiety disorder, and alcohol use disorder, in early remission.      The client stated that she has increased her alcohol abstinence for another week, and stated proudly she was watching her grandson of 18 months of age from Saturday through today.  She stated that she was able to talk with her  Rubio regarding her concerns and anxieties, and stated that the conversation was not very validating from her perspective, but she was pleased that she engaged in conversation about her stress.  She again states that her resentment towards her brother-in-law's girlfriend continues from several years ago when the other woman had an affair with her .  She stated that she was open to learning about strategies to manage her frustration and work on forgiveness.  Therapist provided several suggestions per Nicola Draper.  She was responsive to the resentment/hate section of irritability (style of anger).  She was encouraged to utilize relaxation procedures when permitted and without stress involved so that she could improve her skill and utilize that when needed properly.  She was encouraged to consider things that she enjoyed or was able to allow to calm her including  going for [place] in nature, seeing trees, flowers, moves, and other creatures of the woods.  She also states that she enjoys being by [place] and coming for agets.  She responded favorably to interventions, and was provided the step procedure for forgiveness work.     Treatment Objective(s) Addressed in This Session:   attend AA at least 1 times in the next week  use at least 5 coping skills for anxiety management in the next 10 weeks  Increase interest, engagement, and pleasure in doing things  Decrease frequency and intensity of feeling down, depressed, hopeless  Improve quantity and quality of night time sleep / decrease daytime naps  Feel less tired and more energy during the day   Improve diet, appetite, mindful eating, and / or meal planning  Identify negative self-talk and behaviors: challenge core beliefs, myths, and actions  Improve concentration, focus, and mindfulness in daily activities   Feel less fidgety, restless or slow in daily activities / interpersonal interactions  Decrease thoughts that you'd be better off dead or of suicide / self-harm       Intervention:   Motivational Interviewing    MI Intervention: Expressed Empathy/Understanding, Supported Autonomy, Collaboration, Evocation, Permission to raise concern or advise, Open-ended questions, Reflections: simple and complex, Change talk (evoked) and Reframe   Change Talk Expressed by the Patient: Desire to change Ability to change Reasons to change Need to change Committment to change Activation Taking steps  Provider Response to Change Talk: E - Evoked more info from patient about behavior change, A - Affirmed patient's thoughts, decisions, or attempts at behavior change, R - Reflected patient's change talk and S - Summarized patient's change talk statements      Assessments completed prior to visit:  The following assessments were completed by patient for this visit:  PHQ9:   PHQ-9 SCORE 9/6/2019 8/6/2020 9/15/2020 11/13/2020 2/9/2021  5/14/2021 8/25/2022   PHQ-9 Total Score - - - - - - -   PHQ-9 Total Score MyChart - 19 (Moderately severe depression) - 4 (Minimal depression) - - 12 (Moderate depression)   PHQ-9 Total Score 5 19 10 4 10 5 12     GAD2:   HONEY-2 9/8/2022   Feeling nervous, anxious, or on edge 1   Not being able to stop or control worrying 1   HONEY-2 Total Score 2     CAGE-AID:   CAGE-AID Total Score 8/6/2020 8/18/2020   Total Score 3 4     PROMIS 10-Global Health (only subscores and total score):   PROMIS-10 Scores Only 9/8/2022   Global Mental Health Score 10   Global Physical Health Score 14   PROMIS TOTAL - SUBSCORES 24     [place] Suicide Severity Rating Scale (Lifetime/Recent)  [place] Suicide Severity Rating (Lifetime/Recent) 8/6/2020 8/18/2020   Wish to be Dead (Lifetime) - Yes   Comments - after relapse/divorce threat in late July 2020   Non-Specific Active Suicidal Thoughts (Lifetime) - Yes   Non-Specific Active Suicidal Thought Description (Lifetime) - Hopelessness/depression - passive   Most Severe Ideation Rating (Lifetime) - 3   Most Severe Ideation Description (Lifetime) - Overdosed on alcohol, SSRIs and a few opioids 7/29/20   Frequency (Lifetime) - 4   Duration (Lifetime) - 4   Controllability (Lifetime) - 5   Protective Factors  (Lifetime) - 3   Reasons for Ideation (Lifetime) - 3   Q1 Wished to be Dead (Past Month) yes -   Q2 Suicidal Thoughts (Past Month) yes -   Q3 Suicidal Thought Method yes -   Q4 Suicidal Intent without Specific Plan no -   Q5 Suicide Intent with Specific Plan yes -   Q6 Suicide Behavior (Lifetime) yes -   RETIRED: 1. Wish to be Dead (Recent) - Yes   RETIRED: Wish to be Dead Description (Recent) - after relapse/divorce threat in late July 2020   RETIRED: 2. Non-Specific Active Suicidal Thoughts (Recent) - Yes   Non-Specific Active Suicidal Thought Description (Recent) - Recent suicide attempt on 7/29/20   3. Active Suicidal Ideation with any Methods (Not Plan) Without Intent to Act (Lifetime)  - No   RETIRED: 3. Active Suicidal Ideation with any Methods (Not Plan) Without Intent to Act (Recent) - No   RETIRE: 4. Active Suicidal Ideation with Some Intent to Act, Without Specific Plan (Lifetime) - No   4. Active Suicidal Ideation with Some Intent to Act, Without Specific Plan (Recent) - No   RETIRE: 5. Active Suicidal Ideation with Specific Plan and Intent (Lifetime) - Yes   Active Suicidal Ideation with Specific Plan and Intent Description (Lifetime) - Attempt 7/29/20   RETIRED: 5. Active Suicidal Ideation with Specific Plan and Intent (Recent) - Yes   RETIRED: Active Suicidal Ideation with Specific Plan and Intent Description (Recent) - Attempt 7/29/20   Most Severe Ideation Rating (Past Month) - 3   Most Severe Ideation Description (Past Month) - Overdosed on alcohol, SSRIs and a few opioids 7/29/20   Frequency (Past Month) - 4   Duration (Past Month) - 4   Controllability (Past Month) - 5   Protective Factors (Past Month) - 3   Reasons for Ideation (Past Month) - 3   Actual Attempt (Lifetime) - Yes   Actual Attempt Description (Lifetime) - 7/29/20 after relapsing on alcohol   Total Number of Actual Attempts (Lifetime) - 3   Comments - 2012, 2014 & 2020 all when drinking   Actual Attempt (Past 3 Months) - Yes   Actual Attempt Description (Past 3 Months) - 7/29/20 after relapsing on alcohol   Total Number of Actual Attempts (Past 3 Months) - 1   Has subject engaged in non-suicidal self-injurious behavior? (Lifetime) - Yes   Comments - Cutting as a teen   Has subject engaged in non-suicidal self-injurious behavior? (Past 3 Months) - No   Interrupted Attempts (Lifetime) - No   Interrupted Attempts (Past 3 Months) - No   Aborted or Self-Interrupted Attempt (Lifetime) - No   Aborted or Self-Interrupted Attempt (Past 3 Months) - No   Preparatory Acts or Behavior (Lifetime) - No   Preparatory Acts or Behavior (Past 3 Months) - No   Most Recent Attempt Date - 94598   Most Recent Attempt Actual Lethality Code - 3  "  Most Lethal Attempt Actual Lethality Code - 3   Initial/First Attempt Date - 57453   Initial/First Attempt Actual Lethality Code - 3         ASSESSMENT: Current Emotional / Mental Status (status of significant symptoms):   Risk status (Self / Other harm or suicidal ideation)   Patient denies current fears or concerns for personal safety.   Patient reports the following current or recent suicidal ideation or behaviors: passive suicidal ideation is intermittent during a typical week, without intention, plan, or attempt..   Patient reports current or recent homicidal ideation or behaviors including She has fantasies of her brother-in-law's girlfriend having disappointment or \"bad things happening\" but no urge, plan, intent, action towards harm of her nor homicidal ideation.   Patient denies current or recent self injurious behavior or ideation.   Patient denies other safety concerns.   Patient reports there has been no change in risk factors since their last session.     Patient reports there has been no change in protective factors since their last session.     Recommended that patient call 911 or go to the local ED should there be a change in any of these risk factors.     A safety and risk management plan has been developed including: Patient has no change in safety concerns. Committed to safety and agreed to follow previously developed safety plan. Patient consented to co-developed safety plan.  Safety and risk management plan was completed.  Patient agreed to use safety plan should any safety concerns arise.  A copy was given to the patient.  Patient consented to co-developed safety plan on 8/25/2022.  Safety and risk management plan was reviewed.   Patient agreed to use safety plan should any safety concerns arise.  A copy was made available to the patient.  Patient did not answer a question if there were firearms present in the home.       Appearance:   Appropriate    Eye Contact:   Good    Psychomotor " Behavior: Normal    Attitude:   Cooperative  Pleasant   Orientation:   All   Speech    Rate / Production: Normal     Volume:  Normal    Mood:    Anxious  Depressed  Irritable    Affect:    Appropriate  Lethargic    Thought Content:  Hypervigilent   Thought Form:  Coherent    Insight:    Good      Medication Review:   No changes to current psychiatric medication(s)     Medication Compliance:   Yes     Changes in Health Issues:   None reported     Chemical Use Review:   Substance Use: Problem use continues with no change since last session, Stage of Change: Action        Tobacco Use: No change in amount of tobacco use since last session.  Action    Diagnosis:  1. Major depressive disorder, recurrent episode, moderate (H)    2. HONEY (generalized anxiety disorder)    3. Alcohol use disorder, moderate, in early remission (H)        Collateral Reports Completed:   Not Applicable    PLAN: (Patient Tasks / Therapist Tasks / Other)  Client will discuss her feelings and needs with her partner including her need for feeling defended or protected from the emotional threat she feels from the affair partner.  She agreed to also address tension reduction strategies, emotional coaching and logic replacements of distortion cognitions.  Client will maintain sobriety from substances and will also initiate attendance in online AA groups or local AA meetings when possible.  Therapist will continue addressing emotion regulation strategies.        Cornelius Mckeon, JESSICA                                                           ______________________________________________________________________    Individual Treatment Plan    Patient's Name: Kesha David  YOB: 1970    Date of Creation: 9/8/2022  Date Treatment Plan Last Reviewed/Revised: 9/8/2022    DSM5 Diagnoses:  1. Major depressive disorder, recurrent episode, moderate (H)    2. HONEY (generalized anxiety disorder)    3. Alcohol use disorder, moderate, in early  remission (H)         Psychosocial / Contextual Factors: Past hospitalizations for substance misuse and mental health crises, current conflict with marital partner, support system conflicts  PROMIS (reviewed every 90 days): See above    Referral / Collaboration:  Referral to another professional/service is not indicated at this time..    Anticipated number of session for this episode of care: 6-9 sessions  Anticipation frequency of session: Weekly  Anticipated Duration of each session: 38-52 minutes  Treatment plan will be reviewed in 90 days or when goals have been changed.       MeasurableTreatment Goal(s) related to diagnosis / functional impairment(s)  Goal 1: Patient will maintain sobriety, decrease depressive symptoms, and increase personal confidence and stress/emotion regulation techniques.    I will know I've met my goal when not reported.      Objective #A (Patient Action)    Patient will attend AA at least 1 times in the next week  use at least 5 coping skills for anxiety management in the next 10 weeks  Increase interest, engagement, and pleasure in doing things  Decrease frequency and intensity of feeling down, depressed, hopeless  Improve quantity and quality of night time sleep / decrease daytime naps  Feel less tired and more energy during the day   Improve diet, appetite, mindful eating, and / or meal planning  Identify negative self-talk and behaviors: challenge core beliefs, myths, and actions  Improve concentration, focus, and mindfulness in daily activities   Feel less fidgety, restless or slow in daily activities / interpersonal interactions  Decrease thoughts that you'd be better off dead or of suicide / self-harm  Status: New - Date: 9/8/2022     Intervention(s)  Therapist will teach emotional regulation skills. Cognitive behavior therapy, emotion regulation strategies, and assertive communication will all be utilized to support treatment plan goals..    Patient has reviewed and agreed to the  above plan.      Cornelius Jacek JESSICA Mckeon  September 8, 2022        Safety Plan: To be completed upon next session as client did not complete fully.  Adult Short Safety Plan:   Name: Kesha David  YOB: 1970  Date: August 25, 2022   My primary care provider: Tanvir Peterson  My primary care clinic: [facility]  My prescriber: Dr. Tanvir Benavidez  Other care team support: JESSICA Schwartz   My Triggers:  Relationship conflict With extended family including brother-in-law's partner, Home environment Disruption due to, Medical Health Escalating rumination and Substance Use Relapse if occurring     Additional People, Places, and Things that I can access for support: Spouse, friends, clinical provider         What is important to me and makes life worth living: To be determined.         GREEN    Good Control  1. I feel good  2. No suicidal thoughts   3. Can work, sleep and play      Action Steps  1. Self-care: balanced meals, exercising, sleep practices, etc.  2. Take your medications as prescribed.  3. Continue meetings with therapist and prescriber.  4.  Do the healthy things that I enjoy.  5.  Stay active           YELLOW  Getting Worse  I have ANY of these:  1. I do not feel good  2. Difficulty Concentrating  3. Sleep is changing  4. Increase/Change in my thoughts to hurt self and/or others, but I can still manage and not act on it.   5. Not taking care of self.  6.  Feeling triggered             Action Steps (in addition to the above):  1. Inform your therapist and psychiatric prescriber/PCP.  2. Keep taking your medications as prescribed.    3. Turn to people you can ask for help.  4. Use internal coping strategies -see below.  5. Create safe environment: store firearms for safety, lock and limit medications and notify friends/family of increase in symptoms  6.  Communicate with supportive natural supports in your environment.           RED  Get Help  If I have ANY of these:  1. Current and  uncontrollable thoughts and/or behaviors to hurt self and/or others.   2.  Current and/or uncontrollable thoughts to use substances   Actions to manage my safety  1. Contact your emergency person partner/spouse  2. Call or Text [phone]  3. Call my crisis team- [facility] [phone] [facility]  3. Or Call 911 or go to the emergency room right away  4.  Call crisis hotline        My Internal Coping Strategies include the following:  take a bath, belly breathing, arts and crafts, color, fidget toys, use my coping box, exercise and use my coping skills    [End for Brief Safety Plan]     Safety Concerns  How To Identify Situations That Make Your Mental Health Worse:  Triggers are things that make your mental health worse.  Look at the list below to help you find your triggers and what you can do about them.     1. Identify Early Warning Signs:    Sometimes symptoms return, even when people do their best to stay well. Symptoms can develop over a short period of time with little or no warning, but most of the time they emerge gradually over several weeks.  Early warning signs are changes that people experience when a relapse is starting. Some early warning signs are common and others are not as common.   Common Early Warning Signs:    Feeling tense or nervous, Trouble sleeping -either too much or too little sleep, Feeling depressed or low, Feeling irritable, Trouble concentrating, Urges to harm self, Urges to harm others and Urges to use drugs or alcohol     2. Identify action steps to take when warning signs are noticed:    Taking Action- It is important to take action if you are experiencing early warning signs of a relapse.  The faster you act, the more likely it is that you can avoid a full relapse.  It is helpful to identify several specific ways to cope with symptoms.      The following is my list of symptoms and coping strategies that I can use when they are present:    Symptom Coping  Strategies   Anxiety -Talk with someone in your support system and let him or her know how you are feeling.  -Use relaxation techniques such as deep breathing or imagery.  -Use positive affirmations to counteract negative self-talk such as  I am learning to let go of worry.    Depression - Schedule your day; include activities you have to do and activities you enjoy doing.  - Get some exercise - walk, run, bike, or swim.  - Give yourself credit for even the smallest things you get done.   Sleep Difficulties   - Go to sleep at the same time every day.  - Do something relaxing before bed, such as drinking herbal tea or listening to music.  - Avoid having discussions about upsetting topics before going to bed.   Delusions   - Distract yourself from the disturbing thought by doing something that requires your attention such as a puzzle.  - Check out your beliefs by talking to someone you trust.    Hallucinations   - Use headphones to listen to music.  - Tell voices to  stop  or say to yourself,  I am safe.   - Ignore the hallucinations as much as possible; focus on other things.   Concentration Difficulties - Minimize distractions so there is only one thing for you to focus on at a time.    - Ask the person you are having a conversation with to slow down or repeat things you are unsure of.        Cornelius Mckeon, JESSICA  August 25, 2022

## 2022-09-19 ENCOUNTER — VIRTUAL VISIT (OUTPATIENT)
Dept: PSYCHOLOGY | Facility: CLINIC | Age: 52
End: 2022-09-19
Payer: COMMERCIAL

## 2022-09-19 DIAGNOSIS — F41.1 GAD (GENERALIZED ANXIETY DISORDER): ICD-10-CM

## 2022-09-19 DIAGNOSIS — F10.21 ALCOHOL USE DISORDER, MODERATE, IN EARLY REMISSION (H): ICD-10-CM

## 2022-09-19 DIAGNOSIS — F33.1 MAJOR DEPRESSIVE DISORDER, RECURRENT EPISODE, MODERATE (H): Primary | ICD-10-CM

## 2022-09-19 PROCEDURE — 90837 PSYTX W PT 60 MINUTES: CPT | Mod: 95 | Performed by: MARRIAGE & FAMILY THERAPIST

## 2022-09-19 NOTE — PROGRESS NOTES
Answers for HPI/ROS submitted by the patient on 2022  If you checked off any problems, how difficult have these problems made it for you to do your work, take care of things at home, or get along with other people?: Somewhat difficult  PHQ9 TOTAL SCORE: 12        [facility]   Mental Health & Addiction Services     Progress Note - Initial Visit    Patient  Name:  Kesha David Date: 22           Service Type: Individual     Visit Start Time: 11 AM  Visit End Time: 12 PM    Visit #: 4    Attendees: Client attended alone    Service Modality:  Video Visit:      Provider verified identity through the following two step process.  Patient provided:  Patient , Patient address and Patient was verified at admission/transfer    Telemedicine Visit: The patient's condition can be safely assessed and treated via synchronous audio and visual telemedicine encounter.      Reason for Telemedicine Visit: Patient has requested telehealth visit    Originating Site (Patient Location): Patient's home    Distant Site (Provider Location): [facility] [place]    Consent:  The patient/guardian has verbally consented to: the potential risks and benefits of telemedicine (video visit) versus in person care; bill my insurance or make self-payment for services provided; and responsibility for payment of non-covered services.     Patient would like the video invitation sent by:  My Chart    Mode of Communication:  Video Conference via [employer]well    As the provider I attest to compliance with applicable laws and regulations related to telemedicine.     DATA:   Extended Session (53+ minutes): PROLONGED SERVICE IN THE OUTPATIENT SETTING REQUIRING DIRECT (FACE-TO-FACE) PATIENT CONTACT BEYOND THE USUAL SERVICE:    - Patient's presenting concerns require more intensive intervention than could be completed within the usual service   Interactive Complexity: No   Crisis: No     Presenting Concerns/  Current  Stressors:    Progress Since Last Session (Related to Symptoms / Goals / Homework):   Symptoms: Improving recent improvement with depressed mood, early alcohol remission from Septemer 1-currently. Anxiety symptoms are daily, and she often finds herself frustrated and avoiding expression of anger.     Homework: Completed in session      Episode of Care Goals: Minimal progress - ACTION (Actively working towards change); Intervened by reinforcing change plan / affirming steps taken     Current / Ongoing Stressors and Concerns:   Client presented as a 52-year-old   cisgender heterosexual female from [place], [place] addressing major depressive disorder recurrent moderate, generalized anxiety disorder, and alcohol use disorder, in early remission.      The client stated that she has increased her alcohol abstinence for another week. Client reports that she has been helping her former best friend's daughter with health related issues.  She described her best friend is passing away after they discovered cancer and COVID related complications.  She states that she and her  connected with her in-laws, and having to be exposed to Susy (brother-in-law's girlfriend).  She stated that the woman walked in acting as if she on the place, which triggered Kesha.  She then talked about being hypervigilant whenever she is at the house because she is watching for Susy to arrive.  She states that her communication with her brother-in-law has been limited at past, but she was able to communicate her feelings with her  recently as well as express her feelings of betrayal to her mother-in-law.  She then talked about how other people are constantly talking in the home, which makes it hard for her to share anything or participate in the conversations.  She discussed having to be hypervigilant and aware of social interactions when she was a child, because her emotional wellbeing depended upon it.   Therapist encouraged her to recognize that she also has things to contribute to the house and family including her emotional connection focus.  At times, the client states that she feels left out, but has been working towards participating more.  She states that she is still in the anger phase of acceptance, but has been finding tolerance skills as well as feelings communications to be effective in helping her.  Therapist highlighted her greater awareness, her process through anger and acceptance, and her improved communication as things that she has actively done to demonstrate progress.  She states that one of the more outstanding events was when her  checked in on her to see how she was feeling.  Therapist highlighted how she could continue to reinforce this process and to express her gratitude towards her  and his ongoing choices to commit attone with her.        Treatment Objective(s) Addressed in This Session:   attend AA at least 1 times in the next week  use at least 5 coping skills for anxiety management in the next 10 weeks  Increase interest, engagement, and pleasure in doing things  Decrease frequency and intensity of feeling down, depressed, hopeless  Improve quantity and quality of night time sleep / decrease daytime naps  Feel less tired and more energy during the day   Improve diet, appetite, mindful eating, and / or meal planning  Identify negative self-talk and behaviors: challenge core beliefs, myths, and actions  Improve concentration, focus, and mindfulness in daily activities   Feel less fidgety, restless or slow in daily activities / interpersonal interactions  Decrease thoughts that you'd be better off dead or of suicide / self-harm       Intervention:   Motivational Interviewing  MI Intervention: Expressed Empathy/Understanding, Supported Autonomy, Collaboration, Evocation, Permission to raise concern or advise, Open-ended questions, Reflections: simple and complex, Change talk  (evoked) and Reframe   Change Talk Expressed by the Patient: Desire to change Ability to change Reasons to change Need to change Committment to change Activation Taking steps  Provider Response to Change Talk: E - Evoked more info from patient about behavior change, A - Affirmed patient's thoughts, decisions, or attempts at behavior change, R - Reflected patient's change talk and S - Summarized patient's change talk statements      Assessments completed prior to visit:  The following assessments were completed by patient for this visit:  PHQ9:   PHQ-9 SCORE 9/6/2019 8/6/2020 9/15/2020 11/13/2020 2/9/2021 5/14/2021 8/25/2022   PHQ-9 Total Score - - - - - - -   PHQ-9 Total Score MyChart - 19 (Moderately severe depression) - 4 (Minimal depression) - - 12 (Moderate depression)   PHQ-9 Total Score 5 19 10 4 10 5 12     GAD2:   HONEY-2 9/8/2022   Feeling nervous, anxious, or on edge 1   Not being able to stop or control worrying 1   HONEY-2 Total Score 2     CAGE-AID:   CAGE-AID Total Score 8/6/2020 8/18/2020   Total Score 3 4     PROMIS 10-Global Health (only subscores and total score):   PROMIS-10 Scores Only 9/8/2022   Global Mental Health Score 10   Global Physical Health Score 14   PROMIS TOTAL - SUBSCORES 24     [place] Suicide Severity Rating Scale (Lifetime/Recent)  [place] Suicide Severity Rating (Lifetime/Recent) 8/6/2020 8/18/2020   Wish to be Dead (Lifetime) - Yes   Comments - after relapse/divorce threat in late July 2020   Non-Specific Active Suicidal Thoughts (Lifetime) - Yes   Non-Specific Active Suicidal Thought Description (Lifetime) - Hopelessness/depression - passive   Most Severe Ideation Rating (Lifetime) - 3   Most Severe Ideation Description (Lifetime) - Overdosed on alcohol, SSRIs and a few opioids 7/29/20   Frequency (Lifetime) - 4   Duration (Lifetime) - 4   Controllability (Lifetime) - 5   Protective Factors  (Lifetime) - 3   Reasons for Ideation (Lifetime) - 3   Q1 Wished to be Dead (Past Month)  yes -   Q2 Suicidal Thoughts (Past Month) yes -   Q3 Suicidal Thought Method yes -   Q4 Suicidal Intent without Specific Plan no -   Q5 Suicide Intent with Specific Plan yes -   Q6 Suicide Behavior (Lifetime) yes -   RETIRED: 1. Wish to be Dead (Recent) - Yes   RETIRED: Wish to be Dead Description (Recent) - after relapse/divorce threat in late July 2020   RETIRED: 2. Non-Specific Active Suicidal Thoughts (Recent) - Yes   Non-Specific Active Suicidal Thought Description (Recent) - Recent suicide attempt on 7/29/20   3. Active Suicidal Ideation with any Methods (Not Plan) Without Intent to Act (Lifetime) - No   RETIRED: 3. Active Suicidal Ideation with any Methods (Not Plan) Without Intent to Act (Recent) - No   RETIRE: 4. Active Suicidal Ideation with Some Intent to Act, Without Specific Plan (Lifetime) - No   4. Active Suicidal Ideation with Some Intent to Act, Without Specific Plan (Recent) - No   RETIRE: 5. Active Suicidal Ideation with Specific Plan and Intent (Lifetime) - Yes   Active Suicidal Ideation with Specific Plan and Intent Description (Lifetime) - Attempt 7/29/20   RETIRED: 5. Active Suicidal Ideation with Specific Plan and Intent (Recent) - Yes   RETIRED: Active Suicidal Ideation with Specific Plan and Intent Description (Recent) - Attempt 7/29/20   Most Severe Ideation Rating (Past Month) - 3   Most Severe Ideation Description (Past Month) - Overdosed on alcohol, SSRIs and a few opioids 7/29/20   Frequency (Past Month) - 4   Duration (Past Month) - 4   Controllability (Past Month) - 5   Protective Factors (Past Month) - 3   Reasons for Ideation (Past Month) - 3   Actual Attempt (Lifetime) - Yes   Actual Attempt Description (Lifetime) - 7/29/20 after relapsing on alcohol   Total Number of Actual Attempts (Lifetime) - 3   Comments - 2012, 2014 & 2020 all when drinking   Actual Attempt (Past 3 Months) - Yes   Actual Attempt Description (Past 3 Months) - 7/29/20 after relapsing on alcohol   Total Number  "of Actual Attempts (Past 3 Months) - 1   Has subject engaged in non-suicidal self-injurious behavior? (Lifetime) - Yes   Comments - Cutting as a teen   Has subject engaged in non-suicidal self-injurious behavior? (Past 3 Months) - No   Interrupted Attempts (Lifetime) - No   Interrupted Attempts (Past 3 Months) - No   Aborted or Self-Interrupted Attempt (Lifetime) - No   Aborted or Self-Interrupted Attempt (Past 3 Months) - No   Preparatory Acts or Behavior (Lifetime) - No   Preparatory Acts or Behavior (Past 3 Months) - No   Most Recent Attempt Date - 65589   Most Recent Attempt Actual Lethality Code - 3   Most Lethal Attempt Actual Lethality Code - 3   Initial/First Attempt Date - 65589   Initial/First Attempt Actual Lethality Code - 3         ASSESSMENT: Current Emotional / Mental Status (status of significant symptoms):   Risk status (Self / Other harm or suicidal ideation)   Patient denies current fears or concerns for personal safety.   Patient reports the following current or recent suicidal ideation or behaviors: passive suicidal ideation is intermittent during a typical week, without intention, plan, or attempt..   Patient reports current or recent homicidal ideation or behaviors including She has fantasies of her brother-in-law's girlfriend having disappointment or \"bad things happening\" but no urge, plan, intent, action towards harm of her nor homicidal ideation.   Patient denies current or recent self injurious behavior or ideation.   Patient denies other safety concerns.   Patient reports there has been no change in risk factors since their last session.     Patient reports there has been no change in protective factors since their last session.     Recommended that patient call 911 or go to the local ED should there be a change in any of these risk factors.       A safety and risk management plan has been developed including: Patient has no change in safety concerns. Committed to safety and agreed to " follow previously developed safety plan. Patient consented to co-developed safety plan.  Safety and risk management plan was completed.  Patient agreed to use safety plan should any safety concerns arise.  A copy was given to the patient.  Patient consented to co-developed safety plan on 8/25/2022.  Safety and risk management plan was reviewed.   Patient agreed to use safety plan should any safety concerns arise.  A copy was made available to the patient.       Appearance:   Appropriate    Eye Contact:   Good    Psychomotor Behavior: Normal    Attitude:   Cooperative  Friendly Pleasant   Orientation:   Person Place Time Situation   Speech    Rate / Production: Normal/ Responsive    Volume:  Normal    Mood:    Anxious  Depressed  Sad    Affect:    Appropriate  Subdued    Thought Content:  Hypervigilent   Thought Form:  Coherent    Insight:    Good      Medication Review:   No changes to current psychiatric medication(s)     Medication Compliance:   Yes     Changes in Health Issues:   None reported     Chemical Use Review:   Substance Use: Problem use continues with no change since last session, Stage of Change: Action        Tobacco Use: No change in amount of tobacco use since last session.  Action    Diagnosis:  1. Major depressive disorder, recurrent episode, moderate (H)    2. HONEY (generalized anxiety disorder)    3. Alcohol use disorder, moderate, in early remission (H)        Collateral Reports Completed:   Not Applicable    PLAN: (Patient Tasks / Therapist Tasks / Other)  Client will discuss her feelings and needs with her partner including her need for feeling defended or protected from the emotional threat she feels from the affair partner.  She agreed to also address tension reduction strategies, emotional coaching and logic replacements of distortion cognitions.  Client will maintain sobriety from substances and will also initiate attendance in online AA groups or local AA meetings when  possible.  Therapist will continue addressing emotion regulation strategies.        Cornelius Mckeon, LMFT                                                           ______________________________________________________________________    Individual Treatment Plan    Patient's Name: Kesha David  YOB: 1970    Date of Creation: 9/8/2022  Date Treatment Plan Last Reviewed/Revised: 9/8/2022    DSM5 Diagnoses:  1. Major depressive disorder, recurrent episode, moderate (H)    2. HONEY (generalized anxiety disorder)    3. Alcohol use disorder, moderate, in early remission (H)         Psychosocial / Contextual Factors: Past hospitalizations for substance misuse and mental health crises, current conflict with marital partner, support system conflicts  PROMIS (reviewed every 90 days): See above    Referral / Collaboration:  Referral to another professional/service is not indicated at this time..    Anticipated number of session for this episode of care: 6-9 sessions  Anticipation frequency of session: Weekly  Anticipated Duration of each session: 38-52 minutes  Treatment plan will be reviewed in 90 days or when goals have been changed.       MeasurableTreatment Goal(s) related to diagnosis / functional impairment(s)  Goal 1: Patient will maintain sobriety, decrease depressive symptoms, and increase personal confidence and stress/emotion regulation techniques.    I will know I've met my goal when not reported.      Objective #A (Patient Action)    Patient will attend AA at least 1 times in the next week  use at least 5 coping skills for anxiety management in the next 10 weeks  Increase interest, engagement, and pleasure in doing things  Decrease frequency and intensity of feeling down, depressed, hopeless  Improve quantity and quality of night time sleep / decrease daytime naps  Feel less tired and more energy during the day   Improve diet, appetite, mindful eating, and / or meal planning  Identify negative  self-talk and behaviors: challenge core beliefs, myths, and actions  Improve concentration, focus, and mindfulness in daily activities   Feel less fidgety, restless or slow in daily activities / interpersonal interactions  Decrease thoughts that you'd be better off dead or of suicide / self-harm  Status: New - Date: 9/8/2022     Intervention(s)  Therapist will teach emotional regulation skills. Cognitive behavior therapy, emotion regulation strategies, and assertive communication will all be utilized to support treatment plan goals..    Patient has reviewed and agreed to the above plan.      JESSICA Rojas  September 8, 2022        Safety Plan: To be completed upon next session as client did not complete fully.  Adult Short Safety Plan:   Name: Kesha David  YOB: 1970  Date: August 25, 2022   My primary care provider: Tanvir Peterson  My primary care clinic: [facility]  My prescriber: Dr. Tanvir Benavidez  Other care team support: JESSICA Schwartz   My Triggers:  Relationship conflict With extended family including brother-in-law's partner, Home environment Disruption due to, Medical Health Escalating rumination and Substance Use Relapse if occurring     Additional People, Places, and Things that I can access for support: Spouse, friends, clinical provider         What is important to me and makes life worth living: To be determined.         GREEN    Good Control  1. I feel good  2. No suicidal thoughts   3. Can work, sleep and play      Action Steps  1. Self-care: balanced meals, exercising, sleep practices, etc.  2. Take your medications as prescribed.  3. Continue meetings with therapist and prescriber.  4.  Do the healthy things that I enjoy.  5.  Stay active           YELLOW  Getting Worse  I have ANY of these:  1. I do not feel good  2. Difficulty Concentrating  3. Sleep is changing  4. Increase/Change in my thoughts to hurt self and/or others, but I can still manage and not  act on it.   5. Not taking care of self.  6.  Feeling triggered             Action Steps (in addition to the above):  1. Inform your therapist and psychiatric prescriber/PCP.  2. Keep taking your medications as prescribed.    3. Turn to people you can ask for help.  4. Use internal coping strategies -see below.  5. Create safe environment: store firearms for safety, lock and limit medications and notify friends/family of increase in symptoms  6.  Communicate with supportive natural supports in your environment.           RED  Get Help  If I have ANY of these:  1. Current and uncontrollable thoughts and/or behaviors to hurt self and/or others.   2.  Current and/or uncontrollable thoughts to use substances   Actions to manage my safety  1. Contact your emergency person partner/spouse  2. Call or Text [phone]  3. Call my crisis team- [facility] [phone] [facility]  3. Or Call 911 or go to the emergency room right away  4.  Call crisis hotline        My Internal Coping Strategies include the following:  take a bath, belly breathing, arts and crafts, color, fidget toys, use my coping box, exercise and use my coping skills    [End for Brief Safety Plan]     Safety Concerns  How To Identify Situations That Make Your Mental Health Worse:  Triggers are things that make your mental health worse.  Look at the list below to help you find your triggers and what you can do about them.     1. Identify Early Warning Signs:    Sometimes symptoms return, even when people do their best to stay well. Symptoms can develop over a short period of time with little or no warning, but most of the time they emerge gradually over several weeks.  Early warning signs are changes that people experience when a relapse is starting. Some early warning signs are common and others are not as common.   Common Early Warning Signs:    Feeling tense or nervous, Trouble sleeping -either too much or too little sleep, Feeling  depressed or low, Feeling irritable, Trouble concentrating, Urges to harm self, Urges to harm others and Urges to use drugs or alcohol     2. Identify action steps to take when warning signs are noticed:    Taking Action- It is important to take action if you are experiencing early warning signs of a relapse.  The faster you act, the more likely it is that you can avoid a full relapse.  It is helpful to identify several specific ways to cope with symptoms.      The following is my list of symptoms and coping strategies that I can use when they are present:    Symptom Coping Strategies   Anxiety -Talk with someone in your support system and let him or her know how you are feeling.  -Use relaxation techniques such as deep breathing or imagery.  -Use positive affirmations to counteract negative self-talk such as  I am learning to let go of worry.    Depression - Schedule your day; include activities you have to do and activities you enjoy doing.  - Get some exercise - walk, run, bike, or swim.  - Give yourself credit for even the smallest things you get done.   Sleep Difficulties   - Go to sleep at the same time every day.  - Do something relaxing before bed, such as drinking herbal tea or listening to music.  - Avoid having discussions about upsetting topics before going to bed.   Delusions   - Distract yourself from the disturbing thought by doing something that requires your attention such as a puzzle.  - Check out your beliefs by talking to someone you trust.    Hallucinations   - Use headphones to listen to music.  - Tell voices to  stop  or say to yourself,  I am safe.   - Ignore the hallucinations as much as possible; focus on other things.   Concentration Difficulties - Minimize distractions so there is only one thing for you to focus on at a time.    - Ask the person you are having a conversation with to slow down or repeat things you are unsure of.        Cornelius Mckeon, [facility]  August 25, 2022

## 2022-09-26 ENCOUNTER — VIRTUAL VISIT (OUTPATIENT)
Dept: PSYCHOLOGY | Facility: CLINIC | Age: 52
End: 2022-09-26
Payer: COMMERCIAL

## 2022-09-26 DIAGNOSIS — F41.1 GAD (GENERALIZED ANXIETY DISORDER): ICD-10-CM

## 2022-09-26 DIAGNOSIS — F10.21 ALCOHOL USE DISORDER, MODERATE, IN EARLY REMISSION (H): ICD-10-CM

## 2022-09-26 DIAGNOSIS — F33.1 MAJOR DEPRESSIVE DISORDER, RECURRENT EPISODE, MODERATE (H): Primary | ICD-10-CM

## 2022-09-26 PROCEDURE — 90837 PSYTX W PT 60 MINUTES: CPT | Mod: 95 | Performed by: MARRIAGE & FAMILY THERAPIST

## 2022-09-26 ASSESSMENT — PATIENT HEALTH QUESTIONNAIRE - PHQ9
SUM OF ALL RESPONSES TO PHQ QUESTIONS 1-9: 6
SUM OF ALL RESPONSES TO PHQ QUESTIONS 1-9: 6
10. IF YOU CHECKED OFF ANY PROBLEMS, HOW DIFFICULT HAVE THESE PROBLEMS MADE IT FOR YOU TO DO YOUR WORK, TAKE CARE OF THINGS AT HOME, OR GET ALONG WITH OTHER PEOPLE: SOMEWHAT DIFFICULT

## 2022-09-26 NOTE — PROGRESS NOTES
Answers for HPI/ROS submitted by the patient on 2022  If you checked off any problems, how difficult have these problems made it for you to do your work, take care of things at home, or get along with other people?: Somewhat difficult  PHQ9 TOTAL SCORE: 12        [facility]   Mental Health & Addiction Services     Progress Note - Initial Visit    Patient  Name:  Kesha David Date: 22           Service Type: Individual     Visit Start Time: 11 AM  Visit End Time: 11:55 PM    Visit #: 5    Attendees: Client attended alone    Service Modality:  Video Visit:      Provider verified identity through the following two step process.  Patient provided:  Patient , Patient address and Patient was verified at admission/transfer    Telemedicine Visit: The patient's condition can be safely assessed and treated via synchronous audio and visual telemedicine encounter.      Reason for Telemedicine Visit: Patient has requested telehealth visit    Originating Site (Patient Location): Patient's home    Distant Site (Provider Location): [facility]    Consent:  The patient/guardian has verbally consented to: the potential risks and benefits of telemedicine (video visit) versus in person care; bill my insurance or make self-payment for services provided; and responsibility for payment of non-covered services.     Patient would like the video invitation sent by:  My Chart    Mode of Communication:  Video Conference via [employer]    As the provider I attest to compliance with applicable laws and regulations related to telemedicine.     DATA:   Extended Session (53+ minutes): PROLONGED SERVICE IN THE OUTPATIENT SETTING REQUIRING DIRECT (FACE-TO-FACE) PATIENT CONTACT BEYOND THE USUAL SERVICE:    - Patient's presenting concerns require more intensive intervention than could be completed within the usual service   Interactive Complexity: No   Crisis: No     Presenting Concerns/  Current  Stressors:    Progress Since Last Session (Related to Symptoms / Goals / Homework):   Symptoms: Improving recent improvement with depressed mood, early alcohol remission from Septemer 1-currently. Anxiety symptoms are daily, and she often finds herself frustrated and avoiding expression of anger.     Homework: Completed in session      Episode of Care Goals: Minimal progress - ACTION (Actively working towards change); Intervened by reinforcing change plan / affirming steps taken     Current / Ongoing Stressors and Concerns:   Client presented as a 52-year-old   cisgender heterosexual female from [place], [place] addressing major depressive disorder recurrent moderate, generalized anxiety disorder, and alcohol use disorder, in early remission.      Client discussed feeling frustrated that her  continues to minimize her feelings, and talked briefly about a divorce.  She indicated that she had a couple glasses of wine the other night, which led to feelings of sadness and regret.  She continues to work through her alcohol remission, and finds it challenging for her to improve her mood, when facing constant exposure to her stress.  She states that her anxiety has had less major symptoms/panic symptoms.  Therapist highlighted stress management techniques to assist her, as well as assertiveness training skills to be effective in communicating her needs to her spouse and others if needed.  She responded favorably to these interventions, and denied any suicidal ideation.  Her energy appears to be improving, and she has been having more contact with her grandkids.     Treatment Objective(s) Addressed in This Session:   attend AA at least 1 times in the next week  use at least 5 coping skills for anxiety management in the next 10 weeks  Increase interest, engagement, and pleasure in doing things  Decrease frequency and intensity of feeling down, depressed, hopeless  Improve quantity and quality of night  time sleep / decrease daytime naps  Feel less tired and more energy during the day   Improve diet, appetite, mindful eating, and / or meal planning  Identify negative self-talk and behaviors: challenge core beliefs, myths, and actions  Improve concentration, focus, and mindfulness in daily activities   Feel less fidgety, restless or slow in daily activities / interpersonal interactions  Decrease thoughts that you'd be better off dead or of suicide / self-harm       Intervention:   Motivational Interviewing  MI Intervention: Expressed Empathy/Understanding, Supported Autonomy, Collaboration, Evocation, Permission to raise concern or advise, Open-ended questions, Reflections: simple and complex, Change talk (evoked) and Reframe   Change Talk Expressed by the Patient: Desire to change Ability to change Reasons to change Need to change Committment to change Activation Taking steps  Provider Response to Change Talk: E - Evoked more info from patient about behavior change, A - Affirmed patient's thoughts, decisions, or attempts at behavior change, R - Reflected patient's change talk and S - Summarized patient's change talk statements     Assessments completed prior to visit:  The following assessments were completed by patient for this visit:  PHQ9:   PHQ-9 SCORE 8/6/2020 9/15/2020 11/13/2020 2/9/2021 5/14/2021 8/25/2022 9/26/2022   PHQ-9 Total Score - - - - - - -   PHQ-9 Total Score MyChart 19 (Moderately severe depression) - 4 (Minimal depression) - - 12 (Moderate depression) 6 (Mild depression)   PHQ-9 Total Score 19 10 4 10 5 12 6     GAD2:   HONEY-2 9/8/2022   Feeling nervous, anxious, or on edge 1   Not being able to stop or control worrying 1   HONEY-2 Total Score 2     CAGE-AID:   CAGE-AID Total Score 8/6/2020 8/18/2020   Total Score 3 4     PROMIS 10-Global Health (only subscores and total score):   PROMIS-10 Scores Only 9/8/2022   Global Mental Health Score 10   Global Physical Health Score 14   PROMIS TOTAL -  SUBSCORES 24     [place] Suicide Severity Rating Scale (Lifetime/Recent)  [place] Suicide Severity Rating (Lifetime/Recent) 8/6/2020 8/18/2020   Wish to be Dead (Lifetime) - Yes   Comments - after relapse/divorce threat in late July 2020   Non-Specific Active Suicidal Thoughts (Lifetime) - Yes   Non-Specific Active Suicidal Thought Description (Lifetime) - Hopelessness/depression - passive   Most Severe Ideation Rating (Lifetime) - 3   Most Severe Ideation Description (Lifetime) - Overdosed on alcohol, SSRIs and a few opioids 7/29/20   Frequency (Lifetime) - 4   Duration (Lifetime) - 4   Controllability (Lifetime) - 5   Protective Factors  (Lifetime) - 3   Reasons for Ideation (Lifetime) - 3   Q1 Wished to be Dead (Past Month) yes -   Q2 Suicidal Thoughts (Past Month) yes -   Q3 Suicidal Thought Method yes -   Q4 Suicidal Intent without Specific Plan no -   Q5 Suicide Intent with Specific Plan yes -   Q6 Suicide Behavior (Lifetime) yes -   RETIRED: 1. Wish to be Dead (Recent) - Yes   RETIRED: Wish to be Dead Description (Recent) - after relapse/divorce threat in late July 2020   RETIRED: 2. Non-Specific Active Suicidal Thoughts (Recent) - Yes   Non-Specific Active Suicidal Thought Description (Recent) - Recent suicide attempt on 7/29/20   3. Active Suicidal Ideation with any Methods (Not Plan) Without Intent to Act (Lifetime) - No   RETIRED: 3. Active Suicidal Ideation with any Methods (Not Plan) Without Intent to Act (Recent) - No   RETIRE: 4. Active Suicidal Ideation with Some Intent to Act, Without Specific Plan (Lifetime) - No   4. Active Suicidal Ideation with Some Intent to Act, Without Specific Plan (Recent) - No   RETIRE: 5. Active Suicidal Ideation with Specific Plan and Intent (Lifetime) - Yes   Active Suicidal Ideation with Specific Plan and Intent Description (Lifetime) - Attempt 7/29/20   RETIRED: 5. Active Suicidal Ideation with Specific Plan and Intent (Recent) - Yes   RETIRED: Active Suicidal  Ideation with Specific Plan and Intent Description (Recent) - Attempt 7/29/20   Most Severe Ideation Rating (Past Month) - 3   Most Severe Ideation Description (Past Month) - Overdosed on alcohol, SSRIs and a few opioids 7/29/20   Frequency (Past Month) - 4   Duration (Past Month) - 4   Controllability (Past Month) - 5   Protective Factors (Past Month) - 3   Reasons for Ideation (Past Month) - 3   Actual Attempt (Lifetime) - Yes   Actual Attempt Description (Lifetime) - 7/29/20 after relapsing on alcohol   Total Number of Actual Attempts (Lifetime) - 3   Comments - 2012, 2014 & 2020 all when drinking   Actual Attempt (Past 3 Months) - Yes   Actual Attempt Description (Past 3 Months) - 7/29/20 after relapsing on alcohol   Total Number of Actual Attempts (Past 3 Months) - 1   Has subject engaged in non-suicidal self-injurious behavior? (Lifetime) - Yes   Comments - Cutting as a teen   Has subject engaged in non-suicidal self-injurious behavior? (Past 3 Months) - No   Interrupted Attempts (Lifetime) - No   Interrupted Attempts (Past 3 Months) - No   Aborted or Self-Interrupted Attempt (Lifetime) - No   Aborted or Self-Interrupted Attempt (Past 3 Months) - No   Preparatory Acts or Behavior (Lifetime) - No   Preparatory Acts or Behavior (Past 3 Months) - No   Most Recent Attempt Date - 65589   Most Recent Attempt Actual Lethality Code - 3   Most Lethal Attempt Actual Lethality Code - 3   Initial/First Attempt Date - 65589   Initial/First Attempt Actual Lethality Code - 3         ASSESSMENT: Current Emotional / Mental Status (status of significant symptoms):   Risk status (Self / Other harm or suicidal ideation)   Patient denies current fears or concerns for personal safety.   Patient reports the following current or recent suicidal ideation or behaviors: passive suicidal ideation is intermittent during a typical week, without intention, plan, or attempt..   Patient reports current or recent homicidal ideation or  "behaviors including She has fantasies of her brother-in-law's girlfriend having disappointment or \"bad things happening\" but no urge, plan, intent, action towards harm of her nor homicidal ideation.   Patient denies current or recent self injurious behavior or ideation.   Patient denies other safety concerns.   Patient reports there has been no change in risk factors since their last session.     Patient reports there has been no change in protective factors since their last session.     Recommended that patient call 911 or go to the local ED should there be a change in any of these risk factors.       A safety and risk management plan has been developed including: Patient has no change in safety concerns. Committed to safety and agreed to follow previously developed safety plan. Patient consented to co-developed safety plan.  Safety and risk management plan was completed.  Patient agreed to use safety plan should any safety concerns arise.  A copy was given to the patient.  Patient consented to co-developed safety plan on 8/25/2022.  Safety and risk management plan was reviewed.   Patient agreed to use safety plan should any safety concerns arise.  A copy was made available to the patient.       Appearance:   Appropriate    Eye Contact:   Good    Psychomotor Behavior: Normal    Attitude:   Cooperative  Friendly Pleasant   Orientation:   Person Place Time Situation   Speech    Rate / Production: Normal/ Responsive    Volume:  Normal    Mood:    Anxious  Depressed  Irritable  Sad    Affect:    Blunted    Thought Content:  Clear  Rumination    Thought Form:  Coherent    Insight:    Good      Medication Review:   No changes to current psychiatric medication(s)     Medication Compliance:   Yes     Changes in Health Issues:   None reported     Chemical Use Review:   Substance Use: Problem use continues with no change since last session, Stage of Change: Action        Tobacco Use: No change in amount of tobacco use since " last session.  Action    Diagnosis:  1. Major depressive disorder, recurrent episode, moderate (H)    2. HONEY (generalized anxiety disorder)    3. Alcohol use disorder, moderate, in early remission (H)        Collateral Reports Completed:   Not Applicable    PLAN: (Patient Tasks / Therapist Tasks / Other)  Client will discuss her feelings and needs with her partner including her need for feeling defended or protected from the emotional threat she feels from the affair partner.  She agreed to also address tension reduction strategies, emotional coaching and logic replacements of distortion cognitions.  Client will maintain sobriety from substances and will also initiate attendance in online AA groups or local AA meetings when possible.  Therapist will continue addressing emotion regulation strategies.        Cornelius Mckeon, LMFT                                                           ______________________________________________________________________    Individual Treatment Plan    Patient's Name: Kesha David  YOB: 1970    Date of Creation: 9/8/2022  Date Treatment Plan Last Reviewed/Revised: 9/8/2022    DSM5 Diagnoses:  1. Major depressive disorder, recurrent episode, moderate (H)    2. HONEY (generalized anxiety disorder)    3. Alcohol use disorder, moderate, in early remission (H)         Psychosocial / Contextual Factors: Past hospitalizations for substance misuse and mental health crises, current conflict with marital partner, support system conflicts  PROMIS (reviewed every 90 days): See above    Referral / Collaboration:  Referral to another professional/service is not indicated at this time..    Anticipated number of session for this episode of care: 6-9 sessions  Anticipation frequency of session: Weekly  Anticipated Duration of each session: 38-52 minutes  Treatment plan will be reviewed in 90 days or when goals have been changed.       MeasurableTreatment Goal(s) related to  diagnosis / functional impairment(s)  Goal 1: Patient will maintain sobriety, decrease depressive symptoms, and increase personal confidence and stress/emotion regulation techniques.    I will know I've met my goal when not reported.      Objective #A (Patient Action)    Patient will attend AA at least 1 times in the next week  use at least 5 coping skills for anxiety management in the next 10 weeks  Increase interest, engagement, and pleasure in doing things  Decrease frequency and intensity of feeling down, depressed, hopeless  Improve quantity and quality of night time sleep / decrease daytime naps  Feel less tired and more energy during the day   Improve diet, appetite, mindful eating, and / or meal planning  Identify negative self-talk and behaviors: challenge core beliefs, myths, and actions  Improve concentration, focus, and mindfulness in daily activities   Feel less fidgety, restless or slow in daily activities / interpersonal interactions  Decrease thoughts that you'd be better off dead or of suicide / self-harm  Status: New - Date: 9/8/2022     Intervention(s)  Therapist will teach emotional regulation skills. Cognitive behavior therapy, emotion regulation strategies, and assertive communication will all be utilized to support treatment plan goals..    Patient has reviewed and agreed to the above plan.      JESSICA Rojas  September 8, 2022        Safety Plan: To be completed upon next session as client did not complete fully.  Adult Short Safety Plan:   Name: Kesha David  YOB: 1970  Date: August 25, 2022   My primary care provider: Tanvir Peterson  My primary care clinic: [facility] [place]  My prescriber: Dr. Tanvir Benavidez  Other care team support: JESSICA Schwartz   My Triggers:  Relationship conflict With extended family including brother-in-law's partner, Home environment Disruption due to, Medical Health Escalating rumination and Substance Use Relapse if occurring      Additional People, Places, and Things that I can access for support: Spouse, friends, clinical provider         What is important to me and makes life worth living: To be determined.         GREEN    Good Control  1. I feel good  2. No suicidal thoughts   3. Can work, sleep and play      Action Steps  1. Self-care: balanced meals, exercising, sleep practices, etc.  2. Take your medications as prescribed.  3. Continue meetings with therapist and prescriber.  4.  Do the healthy things that I enjoy.  5.  Stay active           YELLOW  Getting Worse  I have ANY of these:  1. I do not feel good  2. Difficulty Concentrating  3. Sleep is changing  4. Increase/Change in my thoughts to hurt self and/or others, but I can still manage and not act on it.   5. Not taking care of self.  6.  Feeling triggered             Action Steps (in addition to the above):  1. Inform your therapist and psychiatric prescriber/PCP.  2. Keep taking your medications as prescribed.    3. Turn to people you can ask for help.  4. Use internal coping strategies -see below.  5. Create safe environment: store firearms for safety, lock and limit medications and notify friends/family of increase in symptoms  6.  Communicate with supportive natural supports in your environment.           RED  Get Help  If I have ANY of these:  1. Current and uncontrollable thoughts and/or behaviors to hurt self and/or others.   2.  Current and/or uncontrollable thoughts to use substances   Actions to manage my safety  1. Contact your emergency person partner/spouse  2. Call or Text [phone]  3. Call my crisis team- [facility] [phone] [facility]  3. Or Call 911 or go to the emergency room right away  4.  Call crisis hotline        My Internal Coping Strategies include the following:  take a bath, belly breathing, arts and crafts, color, fidget toys, use my coping box, exercise and use my coping skills    [End for Brief Safety Plan]     Safety  Concerns  How To Identify Situations That Make Your Mental Health Worse:  Triggers are things that make your mental health worse.  Look at the list below to help you find your triggers and what you can do about them.     1. Identify Early Warning Signs:    Sometimes symptoms return, even when people do their best to stay well. Symptoms can develop over a short period of time with little or no warning, but most of the time they emerge gradually over several weeks.  Early warning signs are changes that people experience when a relapse is starting. Some early warning signs are common and others are not as common.   Common Early Warning Signs:    Feeling tense or nervous, Trouble sleeping -either too much or too little sleep, Feeling depressed or low, Feeling irritable, Trouble concentrating, Urges to harm self, Urges to harm others and Urges to use drugs or alcohol     2. Identify action steps to take when warning signs are noticed:    Taking Action- It is important to take action if you are experiencing early warning signs of a relapse.  The faster you act, the more likely it is that you can avoid a full relapse.  It is helpful to identify several specific ways to cope with symptoms.      The following is my list of symptoms and coping strategies that I can use when they are present:    Symptom Coping Strategies   Anxiety -Talk with someone in your support system and let him or her know how you are feeling.  -Use relaxation techniques such as deep breathing or imagery.  -Use positive affirmations to counteract negative self-talk such as  I am learning to let go of worry.    Depression - Schedule your day; include activities you have to do and activities you enjoy doing.  - Get some exercise - walk, run, bike, or swim.  - Give yourself credit for even the smallest things you get done.   Sleep Difficulties   - Go to sleep at the same time every day.  - Do something relaxing before bed, such as drinking herbal tea or  listening to music.  - Avoid having discussions about upsetting topics before going to bed.   Delusions   - Distract yourself from the disturbing thought by doing something that requires your attention such as a puzzle.  - Check out your beliefs by talking to someone you trust.    Hallucinations   - Use headphones to listen to music.  - Tell voices to  stop  or say to yourself,  I am safe.   - Ignore the hallucinations as much as possible; focus on other things.   Concentration Difficulties - Minimize distractions so there is only one thing for you to focus on at a time.    - Ask the person you are having a conversation with to slow down or repeat things you are unsure of.        Cornelius Mckeon, [facility]  August 25, 2022          Answers for HPI/ROS submitted by the patient on 9/26/2022  If you checked off any problems, how difficult have these problems made it for you to do your work, take care of things at home, or get along with other people?: Somewhat difficult  PHQ9 TOTAL SCORE: 6

## 2022-10-09 ENCOUNTER — HEALTH MAINTENANCE LETTER (OUTPATIENT)
Age: 52
End: 2022-10-09

## 2022-10-10 ENCOUNTER — VIRTUAL VISIT (OUTPATIENT)
Dept: PSYCHOLOGY | Facility: CLINIC | Age: 52
End: 2022-10-10
Payer: COMMERCIAL

## 2022-10-10 DIAGNOSIS — F41.1 GAD (GENERALIZED ANXIETY DISORDER): ICD-10-CM

## 2022-10-10 DIAGNOSIS — F10.21 ALCOHOL USE DISORDER, MODERATE, IN EARLY REMISSION (H): ICD-10-CM

## 2022-10-10 DIAGNOSIS — F33.1 MAJOR DEPRESSIVE DISORDER, RECURRENT EPISODE, MODERATE (H): Primary | ICD-10-CM

## 2022-10-10 PROCEDURE — 90837 PSYTX W PT 60 MINUTES: CPT | Mod: 95 | Performed by: MARRIAGE & FAMILY THERAPIST

## 2022-10-10 ASSESSMENT — ANXIETY QUESTIONNAIRES
5. BEING SO RESTLESS THAT IT IS HARD TO SIT STILL: SEVERAL DAYS
2. NOT BEING ABLE TO STOP OR CONTROL WORRYING: MORE THAN HALF THE DAYS
4. TROUBLE RELAXING: NEARLY EVERY DAY
6. BECOMING EASILY ANNOYED OR IRRITABLE: NEARLY EVERY DAY
GAD7 TOTAL SCORE: 15
GAD7 TOTAL SCORE: 15
3. WORRYING TOO MUCH ABOUT DIFFERENT THINGS: SEVERAL DAYS
GAD7 TOTAL SCORE: 15
IF YOU CHECKED OFF ANY PROBLEMS ON THIS QUESTIONNAIRE, HOW DIFFICULT HAVE THESE PROBLEMS MADE IT FOR YOU TO DO YOUR WORK, TAKE CARE OF THINGS AT HOME, OR GET ALONG WITH OTHER PEOPLE: VERY DIFFICULT
7. FEELING AFRAID AS IF SOMETHING AWFUL MIGHT HAPPEN: NEARLY EVERY DAY
7. FEELING AFRAID AS IF SOMETHING AWFUL MIGHT HAPPEN: NEARLY EVERY DAY
8. IF YOU CHECKED OFF ANY PROBLEMS, HOW DIFFICULT HAVE THESE MADE IT FOR YOU TO DO YOUR WORK, TAKE CARE OF THINGS AT HOME, OR GET ALONG WITH OTHER PEOPLE?: VERY DIFFICULT
1. FEELING NERVOUS, ANXIOUS, OR ON EDGE: MORE THAN HALF THE DAYS

## 2022-10-10 ASSESSMENT — PATIENT HEALTH QUESTIONNAIRE - PHQ9
10. IF YOU CHECKED OFF ANY PROBLEMS, HOW DIFFICULT HAVE THESE PROBLEMS MADE IT FOR YOU TO DO YOUR WORK, TAKE CARE OF THINGS AT HOME, OR GET ALONG WITH OTHER PEOPLE: VERY DIFFICULT
SUM OF ALL RESPONSES TO PHQ QUESTIONS 1-9: 19
SUM OF ALL RESPONSES TO PHQ QUESTIONS 1-9: 19

## 2022-10-10 NOTE — PROGRESS NOTES
Answers for HPI/ROS submitted by the patient on 2022  If you checked off any problems, how difficult have these problems made it for you to do your work, take care of things at home, or get along with other people?: Somewhat difficult  PHQ9 TOTAL SCORE: 12        [facility]   Mental Health & Addiction Services     Progress Note - Initial Visit    Patient  Name:  Kesha David Date: 10/10/22           Service Type: Individual     Visit Start Time: 11 AM  Visit End Time: 12 PM    Visit #: 6    Attendees: Client and Spouse / Significant Other    Service Modality:  Video Visit:      Provider verified identity through the following two step process.  Patient provided:  Patient , Patient address and Patient was verified at admission/transfer    Telemedicine Visit: The patient's condition can be safely assessed and treated via synchronous audio and visual telemedicine encounter.      Reason for Telemedicine Visit: Patient has requested telehealth visit    Originating Site (Patient Location): Patient's home    Distant Site (Provider Location): [facility]    Consent:  The patient/guardian has verbally consented to: the potential risks and benefits of telemedicine (video visit) versus in person care; bill my insurance or make self-payment for services provided; and responsibility for payment of non-covered services.     Patient would like the video invitation sent by:  My Chart    Mode of Communication:  Video Conference via Amwell    As the provider I attest to compliance with applicable laws and regulations related to telemedicine.     DATA:   Extended Session (53+ minutes): PROLONGED SERVICE IN THE OUTPATIENT SETTING REQUIRING DIRECT (FACE-TO-FACE) PATIENT CONTACT BEYOND THE USUAL SERVICE:    - Patient's presenting concerns require more intensive intervention than could be completed within the usual service   Interactive Complexity: No   Crisis: No     Presenting  Concerns/  Current Stressors:    Progress Since Last Session (Related to Symptoms / Goals / Homework):   Symptoms: Improving recent improvement with depressed mood, early alcohol remission from Septemer 1-currently. Anxiety symptoms are daily, and she often finds herself frustrated and avoiding expression of anger.     Homework: Completed in session      Episode of Care Goals: Minimal progress - ACTION (Actively working towards change); Intervened by reinforcing change plan / affirming steps taken     Current / Ongoing Stressors and Concerns:   Client presented as a 52-year-old   cisgender heterosexual female from [place], [place] addressing major depressive disorder recurrent moderate, generalized anxiety disorder, and alcohol use disorder, in early remission.      Client discussed having a significant relapse and her alcohol dependence.  She states that she was drunk from September 27 through October 5 and was recently in alcohol treatment detox.  She stated that she went to a facility in [facility], and is hoping to be able to attend an inpatient chemical dependence treatment center.  She states that she has lost 2 of her friends over the past couple of years from alcohol or drug issues and struggles to take action for treatment because she has reservations about the 12-step programs, wishing for more harm reduction models.  Therapist supported this realization by the client, and encouraged her to consult her insurance as well as the local community about options she could take to meet her treatment needs.  The client also had her  in attendance Rubio, and agreed to have him participate and discuss her treatment.  She stated that she was triggered by the actions of her brother-in-law's girlfriend and the former affair partner of her .  Her  demonstrated defensiveness when discussing the issue stating that she just needs to get over it.  Therapist challenged this, and  encouraged him to recognize his on accountability and his actions back then and to encourage him and his partner/the client to address their responsibilities for how they each make several actions that can lead to more conflict or isolation between them, which heightens her feelings of frustration and loneliness.  Therapist utilized Gottman method approaches, client and  both responded favorably.  At the end of session therapist provided the client with a MyChart message about [facility] in [place], [place].  She was encouraged to reach out to their facility for admittance to address her current intensified depressive symptoms and her alcohol dependence relapse and now early remission.       Treatment Objective(s) Addressed in This Session:   attend AA at least 1 times in the next week  use at least 5 coping skills for anxiety management in the next 10 weeks  Increase interest, engagement, and pleasure in doing things  Decrease frequency and intensity of feeling down, depressed, hopeless  Improve quantity and quality of night time sleep / decrease daytime naps  Feel less tired and more energy during the day   Improve diet, appetite, mindful eating, and / or meal planning  Identify negative self-talk and behaviors: challenge core beliefs, myths, and actions  Improve concentration, focus, and mindfulness in daily activities   Feel less fidgety, restless or slow in daily activities / interpersonal interactions  Decrease thoughts that you'd be better off dead or of suicide / self-harm       Intervention:   Motivational Interviewing  MI Intervention: Expressed Empathy/Understanding, Supported Autonomy, Collaboration, Evocation, Permission to raise concern or advise, Open-ended questions, Reflections: simple and complex, Change talk (evoked) and Reframe   Change Talk Expressed by the Patient: Desire to change Ability to change Reasons to change Need to change Committment to change Activation Taking  steps  Provider Response to Change Talk: E - Evoked more info from patient about behavior change, A - Affirmed patient's thoughts, decisions, or attempts at behavior change, R - Reflected patient's change talk and S - Summarized patient's change talk statements     Assessments completed prior to visit:  The following assessments were completed by patient for this visit:  PHQ9:   PHQ-9 SCORE 9/15/2020 11/13/2020 2/9/2021 5/14/2021 8/25/2022 9/26/2022 10/10/2022   PHQ-9 Total Score - - - - - - -   PHQ-9 Total Score MyChart - 4 (Minimal depression) - - 12 (Moderate depression) 6 (Mild depression) 19 (Moderately severe depression)   PHQ-9 Total Score 10 4 10 5 12 6 19     GAD2:   HONEY-2 9/8/2022 10/10/2022 10/10/2022   Feeling nervous, anxious, or on edge 1 2 2   Not being able to stop or control worrying 1 3 3   HONEY-2 Total Score 2 5 5     CAGE-AID:   CAGE-AID Total Score 8/6/2020 8/18/2020   Total Score 3 4     PROMIS 10-Global Health (only subscores and total score):   PROMIS-10 Scores Only 9/8/2022   Global Mental Health Score 10   Global Physical Health Score 14   PROMIS TOTAL - SUBSCORES 24     Townsend Suicide Severity Rating Scale (Lifetime/Recent)  Townsend Suicide Severity Rating (Lifetime/Recent) 8/6/2020 8/18/2020   Wish to be Dead (Lifetime) - Yes   Comments - after relapse/divorce threat in late July 2020   Non-Specific Active Suicidal Thoughts (Lifetime) - Yes   Non-Specific Active Suicidal Thought Description (Lifetime) - Hopelessness/depression - passive   Most Severe Ideation Rating (Lifetime) - 3   Most Severe Ideation Description (Lifetime) - Overdosed on alcohol, SSRIs and a few opioids 7/29/20   Frequency (Lifetime) - 4   Duration (Lifetime) - 4   Controllability (Lifetime) - 5   Protective Factors  (Lifetime) - 3   Reasons for Ideation (Lifetime) - 3   Q1 Wished to be Dead (Past Month) yes -   Q2 Suicidal Thoughts (Past Month) yes -   Q3 Suicidal Thought Method yes -   Q4 Suicidal Intent without  Specific Plan no -   Q5 Suicide Intent with Specific Plan yes -   Q6 Suicide Behavior (Lifetime) yes -   RETIRED: 1. Wish to be Dead (Recent) - Yes   RETIRED: Wish to be Dead Description (Recent) - after relapse/divorce threat in late July 2020   RETIRED: 2. Non-Specific Active Suicidal Thoughts (Recent) - Yes   Non-Specific Active Suicidal Thought Description (Recent) - Recent suicide attempt on 7/29/20   3. Active Suicidal Ideation with any Methods (Not Plan) Without Intent to Act (Lifetime) - No   RETIRED: 3. Active Suicidal Ideation with any Methods (Not Plan) Without Intent to Act (Recent) - No   RETIRE: 4. Active Suicidal Ideation with Some Intent to Act, Without Specific Plan (Lifetime) - No   4. Active Suicidal Ideation with Some Intent to Act, Without Specific Plan (Recent) - No   RETIRE: 5. Active Suicidal Ideation with Specific Plan and Intent (Lifetime) - Yes   Active Suicidal Ideation with Specific Plan and Intent Description (Lifetime) - Attempt 7/29/20   RETIRED: 5. Active Suicidal Ideation with Specific Plan and Intent (Recent) - Yes   RETIRED: Active Suicidal Ideation with Specific Plan and Intent Description (Recent) - Attempt 7/29/20   Most Severe Ideation Rating (Past Month) - 3   Most Severe Ideation Description (Past Month) - Overdosed on alcohol, SSRIs and a few opioids 7/29/20   Frequency (Past Month) - 4   Duration (Past Month) - 4   Controllability (Past Month) - 5   Protective Factors (Past Month) - 3   Reasons for Ideation (Past Month) - 3   Actual Attempt (Lifetime) - Yes   Actual Attempt Description (Lifetime) - 7/29/20 after relapsing on alcohol   Total Number of Actual Attempts (Lifetime) - 3   Comments - 2012, 2014 & 2020 all when drinking   Actual Attempt (Past 3 Months) - Yes   Actual Attempt Description (Past 3 Months) - 7/29/20 after relapsing on alcohol   Total Number of Actual Attempts (Past 3 Months) - 1   Has subject engaged in non-suicidal self-injurious behavior? (Lifetime)  "- Yes   Comments - Cutting as a teen   Has subject engaged in non-suicidal self-injurious behavior? (Past 3 Months) - No   Interrupted Attempts (Lifetime) - No   Interrupted Attempts (Past 3 Months) - No   Aborted or Self-Interrupted Attempt (Lifetime) - No   Aborted or Self-Interrupted Attempt (Past 3 Months) - No   Preparatory Acts or Behavior (Lifetime) - No   Preparatory Acts or Behavior (Past 3 Months) - No   Most Recent Attempt Date - 65589   Most Recent Attempt Actual Lethality Code - 3   Most Lethal Attempt Actual Lethality Code - 3   Initial/First Attempt Date - 65589   Initial/First Attempt Actual Lethality Code - 3         ASSESSMENT: Current Emotional / Mental Status (status of significant symptoms):   Risk status (Self / Other harm or suicidal ideation)   Patient denies current fears or concerns for personal safety.   Patient reports the following current or recent suicidal ideation or behaviors: passive suicidal ideation is intermittent during a typical week, without intention, plan, or attempt..   Patient reports current or recent homicidal ideation or behaviors including She has fantasies of her brother-in-law's girlfriend having disappointment or \"bad things happening\" but no urge, plan, intent, action towards harm of her nor homicidal ideation.   Patient denies current or recent self injurious behavior or ideation.   Patient denies other safety concerns.   Patient reports there has been no change in risk factors since their last session.     Patient reports there has been no change in protective factors since their last session.     Recommended that patient call 911 or go to the local ED should there be a change in any of these risk factors.       A safety and risk management plan has been developed including: Patient has no change in safety concerns. Committed to safety and agreed to follow previously developed safety plan. Patient consented to co-developed safety plan.  Safety and risk management " plan was completed.  Patient agreed to use safety plan should any safety concerns arise.  A copy was given to the patient.  Patient consented to co-developed safety plan on 8/25/2022.  Safety and risk management plan was reviewed.   Patient agreed to use safety plan should any safety concerns arise.  A copy was made available to the patient.       Appearance:   Appropriate    Eye Contact:   Good    Psychomotor Behavior: Slowed    Attitude:   Cooperative  Guarded     Orientation:   Person Place Time Situation   Speech    Rate / Production: Normal/ Responsive    Volume:  Normal    Mood:    Depressed  Irritable  Sad  Apathetic   Affect:    Tearful Worrisome    Thought Content:  Clear  Rumination    Thought Form:  Coherent    Insight:    Good      Medication Review:   No changes to current psychiatric medication(s)     Medication Compliance:   Yes     Changes in Health Issues:   None reported     Chemical Use Review:   Substance Use: Problem use continues with no change since last session, Stage of Change: Action        Tobacco Use: No change in amount of tobacco use since last session.  Action    Diagnosis:  1. Major depressive disorder, recurrent episode, moderate (H)    2. HONEY (generalized anxiety disorder)    3. Alcohol use disorder, moderate, in early remission (H)        Collateral Reports Completed:   Not Applicable    PLAN: (Patient Tasks / Therapist Tasks / Other)  Client will discuss her feelings and needs with her partner including her need for feeling defended or protected from the emotional threat she feels from the affair partner.  She agreed to also address tension reduction strategies, emotional coaching and logic replacements of distortion cognitions.  Client will maintain sobriety from substances and will also initiate attendance in online AA groups or local AA meetings when possible.  Therapist will continue addressing emotion regulation strategies.        JESSICA Rojas                                                            ______________________________________________________________________    Individual Treatment Plan    Patient's Name: Kesha David  YOB: 1970    Date of Creation: 9/8/2022  Date Treatment Plan Last Reviewed/Revised: 9/8/2022    DSM5 Diagnoses:  1. Major depressive disorder, recurrent episode, moderate (H)    2. HONEY (generalized anxiety disorder)    3. Alcohol use disorder, moderate, in early remission (H)         Psychosocial / Contextual Factors: Past hospitalizations for substance misuse and mental health crises, current conflict with marital partner, support system conflicts  PROMIS (reviewed every 90 days): See above    Referral / Collaboration:  Referral to another professional/service is not indicated at this time..    Anticipated number of session for this episode of care: 6-9 sessions  Anticipation frequency of session: Weekly  Anticipated Duration of each session: 38-52 minutes  Treatment plan will be reviewed in 90 days or when goals have been changed.       MeasurableTreatment Goal(s) related to diagnosis / functional impairment(s)  Goal 1: Patient will maintain sobriety, decrease depressive symptoms, and increase personal confidence and stress/emotion regulation techniques.    I will know I've met my goal when not reported.      Objective #A (Patient Action)    Patient will attend AA at least 1 times in the next week  use at least 5 coping skills for anxiety management in the next 10 weeks  Increase interest, engagement, and pleasure in doing things  Decrease frequency and intensity of feeling down, depressed, hopeless  Improve quantity and quality of night time sleep / decrease daytime naps  Feel less tired and more energy during the day   Improve diet, appetite, mindful eating, and / or meal planning  Identify negative self-talk and behaviors: challenge core beliefs, myths, and actions  Improve concentration, focus, and mindfulness in daily  activities   Feel less fidgety, restless or slow in daily activities / interpersonal interactions  Decrease thoughts that you'd be better off dead or of suicide / self-harm  Status: New - Date: 9/8/2022     Intervention(s)  Therapist will teach emotional regulation skills. Cognitive behavior therapy, emotion regulation strategies, and assertive communication will all be utilized to support treatment plan goals..    Patient has reviewed and agreed to the above plan.      JESSICA Rojas  September 8, 2022        Safety Plan: To be completed upon next session as client did not complete fully.  Adult Short Safety Plan:   Name: Kesha David  YOB: 1970  Date: August 25, 2022   My primary care provider: Tanvir Peterson  My primary care clinic: [facility]  My prescriber: Dr. Tanvir Benavidez  Other care team support: JESSICA Schwartz   My Triggers:  Relationship conflict With extended family including brother-in-law's partner, Home environment Disruption due to, Medical Health Escalating rumination and Substance Use Relapse if occurring     Additional People, Places, and Things that I can access for support: Spouse, friends, clinical provider         What is important to me and makes life worth living: To be determined.         GREEN    Good Control  1. I feel good  2. No suicidal thoughts   3. Can work, sleep and play      Action Steps  1. Self-care: balanced meals, exercising, sleep practices, etc.  2. Take your medications as prescribed.  3. Continue meetings with therapist and prescriber.  4.  Do the healthy things that I enjoy.  5.  Stay active           YELLOW  Getting Worse  I have ANY of these:  1. I do not feel good  2. Difficulty Concentrating  3. Sleep is changing  4. Increase/Change in my thoughts to hurt self and/or others, but I can still manage and not act on it.   5. Not taking care of self.  6.  Feeling triggered             Action Steps (in addition to the above):  1.  Inform your therapist and psychiatric prescriber/PCP.  2. Keep taking your medications as prescribed.    3. Turn to people you can ask for help.  4. Use internal coping strategies -see below.  5. Create safe environment: store firearms for safety, lock and limit medications and notify friends/family of increase in symptoms  6.  Communicate with supportive natural supports in your environment.           RED  Get Help  If I have ANY of these:  1. Current and uncontrollable thoughts and/or behaviors to hurt self and/or others.   2.  Current and/or uncontrollable thoughts to use substances   Actions to manage my safety  1. Contact your emergency person partner/spouse  2. Call or Text [phone]  3. Call my crisis team- [facility] [phone] [facility]  3. Or Call 911 or go to the emergency room right away  4.  Call crisis hotline        My Internal Coping Strategies include the following:  take a bath, belly breathing, arts and crafts, color, fidget toys, use my coping box, exercise and use my coping skills    [End for Brief Safety Plan]     Safety Concerns  How To Identify Situations That Make Your Mental Health Worse:  Triggers are things that make your mental health worse.  Look at the list below to help you find your triggers and what you can do about them.     1. Identify Early Warning Signs:    Sometimes symptoms return, even when people do their best to stay well. Symptoms can develop over a short period of time with little or no warning, but most of the time they emerge gradually over several weeks.  Early warning signs are changes that people experience when a relapse is starting. Some early warning signs are common and others are not as common.   Common Early Warning Signs:    Feeling tense or nervous, Trouble sleeping -either too much or too little sleep, Feeling depressed or low, Feeling irritable, Trouble concentrating, Urges to harm self, Urges to harm others and Urges to use  drugs or alcohol     2. Identify action steps to take when warning signs are noticed:    Taking Action- It is important to take action if you are experiencing early warning signs of a relapse.  The faster you act, the more likely it is that you can avoid a full relapse.  It is helpful to identify several specific ways to cope with symptoms.      The following is my list of symptoms and coping strategies that I can use when they are present:    Symptom Coping Strategies   Anxiety -Talk with someone in your support system and let him or her know how you are feeling.  -Use relaxation techniques such as deep breathing or imagery.  -Use positive affirmations to counteract negative self-talk such as  I am learning to let go of worry.    Depression - Schedule your day; include activities you have to do and activities you enjoy doing.  - Get some exercise - walk, run, bike, or swim.  - Give yourself credit for even the smallest things you get done.   Sleep Difficulties   - Go to sleep at the same time every day.  - Do something relaxing before bed, such as drinking herbal tea or listening to music.  - Avoid having discussions about upsetting topics before going to bed.   Delusions   - Distract yourself from the disturbing thought by doing something that requires your attention such as a puzzle.  - Check out your beliefs by talking to someone you trust.    Hallucinations   - Use headphones to listen to music.  - Tell voices to  stop  or say to yourself,  I am safe.   - Ignore the hallucinations as much as possible; focus on other things.   Concentration Difficulties - Minimize distractions so there is only one thing for you to focus on at a time.    - Ask the person you are having a conversation with to slow down or repeat things you are unsure of.        JESSICA Rojas  August 25, 2022          Answers for HPI/ROS submitted by the patient on 9/26/2022  If you checked off any problems, how difficult have these  problems made it for you to do your work, take care of things at home, or get along with other people?: Somewhat difficult  PHQ9 TOTAL SCORE: 6    Answers for HPI/ROS submitted by the patient on 10/10/2022  If you checked off any problems, how difficult have these problems made it for you to do your work, take care of things at home, or get along with other people?: Very difficult  PHQ9 TOTAL SCORE: 19  HONEY 7 TOTAL SCORE: 15

## 2022-11-08 ENCOUNTER — MYC MEDICAL ADVICE (OUTPATIENT)
Dept: FAMILY MEDICINE | Facility: OTHER | Age: 52
End: 2022-11-08

## 2022-11-14 ENCOUNTER — OFFICE VISIT (OUTPATIENT)
Dept: FAMILY MEDICINE | Facility: OTHER | Age: 52
End: 2022-11-14
Payer: COMMERCIAL

## 2022-11-14 VITALS
HEART RATE: 77 BPM | RESPIRATION RATE: 12 BRPM | TEMPERATURE: 98.6 F | BODY MASS INDEX: 31.74 KG/M2 | WEIGHT: 197.5 LBS | OXYGEN SATURATION: 97 % | DIASTOLIC BLOOD PRESSURE: 82 MMHG | SYSTOLIC BLOOD PRESSURE: 118 MMHG | HEIGHT: 66 IN

## 2022-11-14 DIAGNOSIS — M79.10 GENERALIZED MUSCLE ACHE: ICD-10-CM

## 2022-11-14 DIAGNOSIS — Z12.31 ENCOUNTER FOR SCREENING MAMMOGRAM FOR BREAST CANCER: ICD-10-CM

## 2022-11-14 DIAGNOSIS — Z13.6 CARDIOVASCULAR SCREENING; LDL GOAL LESS THAN 130: ICD-10-CM

## 2022-11-14 DIAGNOSIS — J45.30 MILD PERSISTENT ASTHMA WITHOUT COMPLICATION: ICD-10-CM

## 2022-11-14 DIAGNOSIS — Z12.11 SCREEN FOR COLON CANCER: ICD-10-CM

## 2022-11-14 DIAGNOSIS — R53.81 MALAISE AND FATIGUE: ICD-10-CM

## 2022-11-14 DIAGNOSIS — F10.21 ALCOHOL USE DISORDER, MODERATE, IN EARLY REMISSION (H): Primary | ICD-10-CM

## 2022-11-14 DIAGNOSIS — R53.83 MALAISE AND FATIGUE: ICD-10-CM

## 2022-11-14 DIAGNOSIS — Z12.31 VISIT FOR SCREENING MAMMOGRAM: ICD-10-CM

## 2022-11-14 DIAGNOSIS — F41.1 GAD (GENERALIZED ANXIETY DISORDER): ICD-10-CM

## 2022-11-14 DIAGNOSIS — F33.0 MAJOR DEPRESSIVE DISORDER, RECURRENT EPISODE, MILD (H): ICD-10-CM

## 2022-11-14 DIAGNOSIS — E66.811 OBESITY (BMI 30.0-34.9): ICD-10-CM

## 2022-11-14 DIAGNOSIS — Z12.4 CERVICAL CANCER SCREENING: ICD-10-CM

## 2022-11-14 DIAGNOSIS — R56.9 SEIZURE (H): ICD-10-CM

## 2022-11-14 PROBLEM — E66.01 MORBID OBESITY (H): Status: RESOLVED | Noted: 2020-11-13 | Resolved: 2022-11-14

## 2022-11-14 LAB
ALBUMIN SERPL-MCNC: 4 G/DL (ref 3.4–5)
ALP SERPL-CCNC: 83 U/L (ref 40–150)
ALT SERPL W P-5'-P-CCNC: 18 U/L (ref 0–50)
ANION GAP SERPL CALCULATED.3IONS-SCNC: 4 MMOL/L (ref 3–14)
AST SERPL W P-5'-P-CCNC: 9 U/L (ref 0–45)
BILIRUB SERPL-MCNC: 0.3 MG/DL (ref 0.2–1.3)
BUN SERPL-MCNC: 9 MG/DL (ref 7–30)
CALCIUM SERPL-MCNC: 9.2 MG/DL (ref 8.5–10.1)
CHLORIDE BLD-SCNC: 110 MMOL/L (ref 94–109)
CHOLEST SERPL-MCNC: 217 MG/DL
CO2 SERPL-SCNC: 28 MMOL/L (ref 20–32)
CREAT SERPL-MCNC: 0.81 MG/DL (ref 0.52–1.04)
ERYTHROCYTE [DISTWIDTH] IN BLOOD BY AUTOMATED COUNT: 13.5 % (ref 10–15)
FASTING STATUS PATIENT QL REPORTED: YES
GFR SERPL CREATININE-BSD FRML MDRD: 87 ML/MIN/1.73M2
GLUCOSE BLD-MCNC: 94 MG/DL (ref 70–99)
HCT VFR BLD AUTO: 40.6 % (ref 35–47)
HDLC SERPL-MCNC: 64 MG/DL
HGB BLD-MCNC: 13.7 G/DL (ref 11.7–15.7)
LACTATE SERPL-SCNC: 2.9 MMOL/L (ref 0.7–2)
LDLC SERPL CALC-MCNC: 119 MG/DL
MCH RBC QN AUTO: 30.8 PG (ref 26.5–33)
MCHC RBC AUTO-ENTMCNC: 33.7 G/DL (ref 31.5–36.5)
MCV RBC AUTO: 91 FL (ref 78–100)
NONHDLC SERPL-MCNC: 153 MG/DL
PLATELET # BLD AUTO: 205 10E3/UL (ref 150–450)
POTASSIUM BLD-SCNC: 4.3 MMOL/L (ref 3.4–5.3)
PROT SERPL-MCNC: 7.3 G/DL (ref 6.8–8.8)
RBC # BLD AUTO: 4.45 10E6/UL (ref 3.8–5.2)
SODIUM SERPL-SCNC: 142 MMOL/L (ref 133–144)
TRIGL SERPL-MCNC: 168 MG/DL
TSH SERPL DL<=0.005 MIU/L-ACNC: 1.61 MU/L (ref 0.4–4)
VIT B12 SERPL-MCNC: 448 PG/ML (ref 232–1245)
WBC # BLD AUTO: 6.9 10E3/UL (ref 4–11)

## 2022-11-14 PROCEDURE — 85027 COMPLETE CBC AUTOMATED: CPT | Performed by: PHYSICIAN ASSISTANT

## 2022-11-14 PROCEDURE — 82607 VITAMIN B-12: CPT | Performed by: PHYSICIAN ASSISTANT

## 2022-11-14 PROCEDURE — 82306 VITAMIN D 25 HYDROXY: CPT | Performed by: PHYSICIAN ASSISTANT

## 2022-11-14 PROCEDURE — 99000 SPECIMEN HANDLING OFFICE-LAB: CPT | Performed by: PHYSICIAN ASSISTANT

## 2022-11-14 PROCEDURE — 80061 LIPID PANEL: CPT | Performed by: PHYSICIAN ASSISTANT

## 2022-11-14 PROCEDURE — 83605 ASSAY OF LACTIC ACID: CPT | Performed by: PHYSICIAN ASSISTANT

## 2022-11-14 PROCEDURE — 84207 ASSAY OF VITAMIN B-6: CPT | Mod: 90 | Performed by: PHYSICIAN ASSISTANT

## 2022-11-14 PROCEDURE — 80175 DRUG SCREEN QUAN LAMOTRIGINE: CPT | Mod: 90 | Performed by: PHYSICIAN ASSISTANT

## 2022-11-14 PROCEDURE — 84443 ASSAY THYROID STIM HORMONE: CPT | Performed by: PHYSICIAN ASSISTANT

## 2022-11-14 PROCEDURE — 99214 OFFICE O/P EST MOD 30 MIN: CPT | Performed by: PHYSICIAN ASSISTANT

## 2022-11-14 PROCEDURE — 36415 COLL VENOUS BLD VENIPUNCTURE: CPT | Performed by: PHYSICIAN ASSISTANT

## 2022-11-14 PROCEDURE — 80053 COMPREHEN METABOLIC PANEL: CPT | Performed by: PHYSICIAN ASSISTANT

## 2022-11-14 RX ORDER — LAMOTRIGINE 25 MG/1
TABLET ORAL
COMMUNITY
Start: 2022-11-03 | End: 2022-11-14

## 2022-11-14 RX ORDER — GABAPENTIN 300 MG/1
300 CAPSULE ORAL 3 TIMES DAILY
COMMUNITY
Start: 2022-11-08 | End: 2022-11-14

## 2022-11-14 RX ORDER — FLUTICASONE FUROATE AND VILANTEROL 200; 25 UG/1; UG/1
1 POWDER RESPIRATORY (INHALATION) DAILY
Qty: 60 EACH | Refills: 11 | Status: SHIPPED | OUTPATIENT
Start: 2022-11-14 | End: 2023-02-13

## 2022-11-14 RX ORDER — ATOMOXETINE 40 MG/1
CAPSULE ORAL
COMMUNITY
Start: 2022-11-03 | End: 2022-11-14

## 2022-11-14 RX ORDER — LAMOTRIGINE 25 MG/1
50 TABLET ORAL DAILY
Qty: 30 TABLET | Refills: 0 | Status: SHIPPED | OUTPATIENT
Start: 2022-11-14 | End: 2022-11-29

## 2022-11-14 RX ORDER — GABAPENTIN 100 MG/1
100 CAPSULE ORAL 3 TIMES DAILY
Qty: 270 CAPSULE | Refills: 0 | Status: SHIPPED | OUTPATIENT
Start: 2022-11-14 | End: 2022-12-20

## 2022-11-14 RX ORDER — ATOMOXETINE 40 MG/1
80 CAPSULE ORAL DAILY
Qty: 180 CAPSULE | Refills: 0 | Status: SHIPPED | OUTPATIENT
Start: 2022-11-14 | End: 2022-11-29

## 2022-11-14 ASSESSMENT — ENCOUNTER SYMPTOMS
BREAST MASS: 0
MYALGIAS: 1
HEARTBURN: 0
PALPITATIONS: 0
ABDOMINAL PAIN: 0
HEMATOCHEZIA: 0
SHORTNESS OF BREATH: 0
HEADACHES: 0
WEAKNESS: 0
EYE PAIN: 0
FEVER: 0
JOINT SWELLING: 0
CHILLS: 0
NAUSEA: 0
FREQUENCY: 0
SORE THROAT: 0
ARTHRALGIAS: 1
DIZZINESS: 0
COUGH: 0
DYSURIA: 0
CONSTIPATION: 0
HEMATURIA: 0
DIARRHEA: 1
PARESTHESIAS: 0
NERVOUS/ANXIOUS: 1

## 2022-11-14 ASSESSMENT — ASTHMA QUESTIONNAIRES
QUESTION_5 LAST FOUR WEEKS HOW WOULD YOU RATE YOUR ASTHMA CONTROL: WELL CONTROLLED
ACT_TOTALSCORE: 23
ACT_TOTALSCORE: 23
QUESTION_2 LAST FOUR WEEKS HOW OFTEN HAVE YOU HAD SHORTNESS OF BREATH: ONCE OR TWICE A WEEK
QUESTION_3 LAST FOUR WEEKS HOW OFTEN DID YOUR ASTHMA SYMPTOMS (WHEEZING, COUGHING, SHORTNESS OF BREATH, CHEST TIGHTNESS OR PAIN) WAKE YOU UP AT NIGHT OR EARLIER THAN USUAL IN THE MORNING: NOT AT ALL
QUESTION_4 LAST FOUR WEEKS HOW OFTEN HAVE YOU USED YOUR RESCUE INHALER OR NEBULIZER MEDICATION (SUCH AS ALBUTEROL): NOT AT ALL
QUESTION_1 LAST FOUR WEEKS HOW MUCH OF THE TIME DID YOUR ASTHMA KEEP YOU FROM GETTING AS MUCH DONE AT WORK, SCHOOL OR AT HOME: NONE OF THE TIME

## 2022-11-14 ASSESSMENT — PAIN SCALES - GENERAL: PAINLEVEL: MILD PAIN (2)

## 2022-11-14 ASSESSMENT — PATIENT HEALTH QUESTIONNAIRE - PHQ9
SUM OF ALL RESPONSES TO PHQ QUESTIONS 1-9: 2
SUM OF ALL RESPONSES TO PHQ QUESTIONS 1-9: 2
10. IF YOU CHECKED OFF ANY PROBLEMS, HOW DIFFICULT HAVE THESE PROBLEMS MADE IT FOR YOU TO DO YOUR WORK, TAKE CARE OF THINGS AT HOME, OR GET ALONG WITH OTHER PEOPLE: SOMEWHAT DIFFICULT

## 2022-11-14 NOTE — PROGRESS NOTES
Assessment & Plan     Alcohol use disorder, moderate, in early remission (H)  Major depressive disorder, recurrent episode, mild (H)  HONEY (generalized anxiety disorder)  Patient presents today for recheck of related medications.  She has been alcoholic for quite some time.  Recently has gone through inpatient treatment yet again.  She is doing well right now.  She thinks she relapsed because of a relationship that her brother-in-law began and now thinks that it is over.  She has had multiple treatment episodes and psychiatric evaluations as well as psychology visits over the years.  She continues to seek treatment in this regard.  We discussed the fact that increasing her dose of her lamotrigine and atomoxetine may help with her overall signs and symptoms and help her feel better in the grand scheme.  Advised that she continue with therapy and follow-up with me in about 3 months.  We may indeed be increasing her lamotrigine up to 75 mg via video or telephone conference.  Atomoxetine would be at max dose that I would think would be healthy for her at this point time.  - lamoTRIgine (LAMICTAL) 25 MG tablet; Take 2 tablets (50 mg) by mouth daily for 15 days  - atomoxetine (STRATTERA) 40 MG capsule; Take 2 capsules (80 mg) by mouth daily  - Lamotrigine Level; Future    Mild persistent asthma without complication  Refilled medications as requested no new concerns.  Lung sounds clear today.  Follow-up in 6 months.  - fluticasone-vilanterol (BREO ELLIPTA) 200-25 MCG/ACT inhaler; Inhale 1 puff into the lungs daily  - Comprehensive metabolic panel (BMP + Alb, Alk Phos, ALT, AST, Total. Bili, TP); Future  - CBC with platelets; Future  - Asthma Action Plan (AAP); Future    Obesity (BMI 30.0-34.9)  Continues to struggle with her weight even in the presence of bariatric surgery.    Seizure (H)  Single seizure that she is aware of.  Will have her continue take just 100 mg 3 times a day as this as we monitor her mood.  We may  "need to increase this dose and she is well aware of this.  Follow-up in 3 months is encouraged.  - gabapentin (NEURONTIN) 100 MG capsule; Take 1 capsule (100 mg) by mouth 3 times daily    Generalized muscle ache  Body aches and pains noted.  Will be taken a look at some electrolytes and vitamins as well as lactic acid level.  Base treatment on results.  - Vitamin B12; Future  - Vitamin B6; Future  - Vitamin D Deficiency; Future  - TSH with free T4 reflex; Future  - Lactic acid whole blood; Future    Malaise and fatigue  - Lamotrigine Level; Future    CARDIOVASCULAR SCREENING; LDL GOAL LESS THAN 130  - Lipid panel reflex to direct LDL Fasting; Future    Encounter for screening mammogram for breast cancer  Visit for screening mammogram  Screening due.  - MA SCREENING DIGITAL BILAT - Future  (s+30); Future    Cervical cancer screening  Screening due.  Advised full female physical in the near future.  She is not ready for this today.    Screen for colon cancer  - Colonoscopy Screening  Referral; Future     BMI:   Estimated body mass index is 31.93 kg/m  as calculated from the following:    Height as of this encounter: 1.675 m (5' 5.95\").    Weight as of this encounter: 89.6 kg (197 lb 8 oz).   Weight management plan: Discussed healthy diet and exercise guidelines    Work on weight loss  Regular exercise  Return for Medication Recheck.    Tanvir Ybarra PA-C  [facility] is [facility] 52 year [facility], presenting for the following health issues:  Recheck Medication      History of Present Illness       Reason for visit:  Check in and med management    She eats 2-3 servings of fruits and vegetables daily.She consumes 5 sweetened beverage(s) daily.She exercises with enough effort to increase her heart rate 9 or less minutes per day.  She exercises with enough effort to increase her heart rate 3 or less days per week. She is missing 1 dose(s) of medications per week.  She is not " taking prescribed medications regularly due to remembering to take.    Today's PHQ-9         PHQ-9 Total Score: 2    PHQ-9 Q9 Thoughts of better off dead/self-harm past 2 weeks :   Not at all    How difficult have these problems made it for you to do your work, take care of things at home, or get along with other people: Somewhat difficult       Depression and Anxiety Follow-Up    How are you doing with your depression since your last visit? Improved    How are you doing with your anxiety since your last visit?  Worsened     Are you having other symptoms that might be associated with depression or anxiety? Yes:  racing thoughts/heart palpitations/sweaty palms    Have you had a significant life event? Grief or Loss     Do you have any concerns with your use of alcohol or other drugs? No    Social History     Tobacco Use     Smoking status: Former     Packs/day: 0.50     Types: Cigarettes     Quit date: 2013     Years since quittin.3     Smokeless tobacco: Never     Tobacco comments:     Thinking about quiting    Vaping Use     Vaping Use: Some days     Substances: Nicotine     Devices: Disposable   Substance Use Topics     Alcohol use: Not Currently     Drug use: No     PHQ 2022 10/10/2022 2022   PHQ-9 Total Score 6 19 2   Q9: Thoughts of better off dead/self-harm past 2 weeks Not at all Several days Not at all   F/U: Thoughts of suicide or self-harm - Yes -   F/U: Self harm-plan - Yes -   F/U: Self-harm action - No -   F/U: Safety concerns - Yes -   Some encounter information is confidential and restricted. Go to [employer] activity to see all data.     HONEY-7 SCORE 2021 10/10/2022 10/10/2022   Total Score - - 15 (severe anxiety)   Total Score 6 15 15   Some encounter information is confidential and restricted. Go to [employer] activity to see all data.     Last PHQ-9 2022   1.  Little interest or pleasure in doing things 0   2.  Feeling down, depressed, or hopeless 0   3.   "Trouble falling or staying asleep, or sleeping too much 1   4.  Feeling tired or having little energy 0   5.  Poor appetite or overeating 0   6.  Feeling bad about yourself 0   7.  Trouble concentrating 1   8.  Moving slowly or restless 0   Q9: Thoughts of better off dead/self-harm past 2 weeks 0   PHQ-9 Total Score 2   Difficulty at work, home, or with people -   In the past two weeks have you had thoughts of suicide or self harm? -   Do you have concerns about your personal safety or the safety of others? -   In the past 2 weeks have you thought about a plan or had intention to harm yourself? -   In the past 2 weeks have you acted on these thoughts in any way? -   Some encounter information is confidential and restricted. Go to Review [employer] activity to see all data.       Suicide Assessment Five-step Evaluation and Treatment (SAFE-T)    Reviewed recent inpatient hospitalization information that is available to me.    Review of Systems   Constitutional, HEENT, cardiovascular, pulmonary, GI, , musculoskeletal, neuro, skin, endocrine and psych systems are negative, except as otherwise noted.      Objective    Pulse 77   Temp 98.6  F (37  C) (Temporal)   Resp 12   Ht 1.675 m (5' 5.95\")   Wt 89.6 kg (197 lb 8 oz)   LMP 12/01/2016 (Approximate)   SpO2 97%   BMI 31.93 kg/m    Body mass index is 31.93 kg/m .  Physical Exam   GENERAL: healthy, alert and no distress  NECK: no adenopathy, no asymmetry, masses, or scars and thyroid normal to palpation  RESP: lungs clear to auscultation - no rales, rhonchi or wheezes  CV: regular rate and rhythm, normal S1 S2, no S3 or S4, no murmur, click or rub, no peripheral edema and peripheral pulses strong  ABDOMEN: soft, nontender, no hepatosplenomegaly, no masses and bowel sounds normal  MS: no gross musculoskeletal defects noted, no edema  SKIN: no suspicious lesions or rashes to exposed visible skin.  NEURO: Normal strength and tone, mentation intact and speech " normal  PSYCH: mentation appears normal, affect normal/bright    No results found for this or any previous visit (from the past 24 hour(s)).        NOTE:  I SPENT 35 MINUTES IN DIRECT CONTACT WITH THIS PATIENT GREATER THAN 50% OF WHICH WAS RELATED TO COUNSELING HER IN RELATIONSHIP TO THE ABOVE MENTIONED CONDITIONS.

## 2022-11-15 ENCOUNTER — OFFICE VISIT (OUTPATIENT)
Dept: FAMILY MEDICINE | Facility: OTHER | Age: 52
End: 2022-11-15
Payer: COMMERCIAL

## 2022-11-15 VITALS
DIASTOLIC BLOOD PRESSURE: 93 MMHG | SYSTOLIC BLOOD PRESSURE: 139 MMHG | WEIGHT: 197 LBS | TEMPERATURE: 98.3 F | OXYGEN SATURATION: 98 % | BODY MASS INDEX: 31.66 KG/M2 | HEART RATE: 76 BPM | HEIGHT: 66 IN | RESPIRATION RATE: 10 BRPM

## 2022-11-15 DIAGNOSIS — F10.21 ALCOHOL USE DISORDER, MODERATE, IN EARLY REMISSION (H): ICD-10-CM

## 2022-11-15 DIAGNOSIS — Z87.898 HISTORY OF SEIZURE: ICD-10-CM

## 2022-11-15 DIAGNOSIS — Z23 IMMUNIZATION DUE: ICD-10-CM

## 2022-11-15 DIAGNOSIS — Z23 NEED FOR VACCINATION: ICD-10-CM

## 2022-11-15 DIAGNOSIS — Z12.11 SCREEN FOR COLON CANCER: ICD-10-CM

## 2022-11-15 DIAGNOSIS — Z12.4 SCREENING FOR CERVICAL CANCER: ICD-10-CM

## 2022-11-15 DIAGNOSIS — I10 HYPERTENSION GOAL BP (BLOOD PRESSURE) < 140/90: ICD-10-CM

## 2022-11-15 DIAGNOSIS — J45.30 MILD PERSISTENT ASTHMA WITHOUT COMPLICATION: ICD-10-CM

## 2022-11-15 DIAGNOSIS — Z72.0 TOBACCO ABUSE: ICD-10-CM

## 2022-11-15 DIAGNOSIS — F33.1 MAJOR DEPRESSIVE DISORDER, RECURRENT EPISODE, MODERATE (H): ICD-10-CM

## 2022-11-15 DIAGNOSIS — K21.00 GASTROESOPHAGEAL REFLUX DISEASE WITH ESOPHAGITIS WITHOUT HEMORRHAGE: ICD-10-CM

## 2022-11-15 DIAGNOSIS — Z00.00 ROUTINE GENERAL MEDICAL EXAMINATION AT A HEALTH CARE FACILITY: Primary | ICD-10-CM

## 2022-11-15 PROBLEM — R53.83 MALAISE AND FATIGUE: Status: RESOLVED | Noted: 2021-11-15 | Resolved: 2022-11-15

## 2022-11-15 PROBLEM — T43.222A: Status: ACTIVE | Noted: 2020-07-30

## 2022-11-15 PROBLEM — R53.81 MALAISE AND FATIGUE: Status: RESOLVED | Noted: 2021-11-15 | Resolved: 2022-11-15

## 2022-11-15 PROBLEM — R52 BODY ACHES: Status: RESOLVED | Noted: 2021-11-15 | Resolved: 2022-11-15

## 2022-11-15 PROBLEM — T14.91XA SUICIDE ATTEMPT (H): Status: ACTIVE | Noted: 2020-07-30

## 2022-11-15 LAB — DEPRECATED CALCIDIOL+CALCIFEROL SERPL-MC: 63 UG/L (ref 20–75)

## 2022-11-15 PROCEDURE — 90715 TDAP VACCINE 7 YRS/> IM: CPT | Performed by: FAMILY MEDICINE

## 2022-11-15 PROCEDURE — G0145 SCR C/V CYTO,THINLAYER,RESCR: HCPCS | Performed by: FAMILY MEDICINE

## 2022-11-15 PROCEDURE — 90472 IMMUNIZATION ADMIN EACH ADD: CPT | Performed by: FAMILY MEDICINE

## 2022-11-15 PROCEDURE — 87624 HPV HI-RISK TYP POOLED RSLT: CPT | Performed by: FAMILY MEDICINE

## 2022-11-15 PROCEDURE — 90750 HZV VACC RECOMBINANT IM: CPT | Performed by: FAMILY MEDICINE

## 2022-11-15 PROCEDURE — 90746 HEPB VACCINE 3 DOSE ADULT IM: CPT | Performed by: FAMILY MEDICINE

## 2022-11-15 PROCEDURE — 99396 PREV VISIT EST AGE 40-64: CPT | Mod: 25 | Performed by: FAMILY MEDICINE

## 2022-11-15 PROCEDURE — 90471 IMMUNIZATION ADMIN: CPT | Performed by: FAMILY MEDICINE

## 2022-11-15 ASSESSMENT — ENCOUNTER SYMPTOMS
WEAKNESS: 0
CONSTIPATION: 0
HEARTBURN: 0
DYSURIA: 0
DIARRHEA: 1
FREQUENCY: 0
HEADACHES: 0
NERVOUS/ANXIOUS: 1
ARTHRALGIAS: 1
HEMATOCHEZIA: 0
ABDOMINAL PAIN: 0
SORE THROAT: 0
SHORTNESS OF BREATH: 0
FEVER: 0
JOINT SWELLING: 0
PALPITATIONS: 0
DIZZINESS: 0
MYALGIAS: 1
NAUSEA: 0
PARESTHESIAS: 0
CHILLS: 0
BREAST MASS: 0
EYE PAIN: 0
HEMATURIA: 0
COUGH: 0

## 2022-11-15 ASSESSMENT — PAIN SCALES - GENERAL: PAINLEVEL: MILD PAIN (2)

## 2022-11-15 NOTE — LETTER
My Asthma Action Plan    Name: Kesha David   YOB: 1970  Date: 11/15/2022   My doctor: Zamzam Webster MD   My clinic: [facility]        My Control Medicine: Fluticasone furoate + vilanterol (Breo Ellipta)  -  200/25mcg .  My Rescue Medicine: Albuterol (Proair/Ventolin/Proventil HFA) 2-4 puffs EVERY 4 HOURS as needed. Use a spacer if recommended by your provider.  My Oral Steroid Medicine: None My Asthma Severity:   Mild Persistent  Know your asthma triggers: animal dander  pollens  animal dander            GREEN ZONE   Good Control    I feel good    No cough or wheeze    Can work, sleep and play without asthma symptoms       Take your asthma control medicine every day.     1. If exercise triggers your asthma, take your rescue medication    15 minutes before exercise or sports, and    During exercise if you have asthma symptoms  2. Spacer to use with inhaler: If you have a spacer, make sure to use it with your inhaler             YELLOW ZONE Getting Worse  I have ANY of these:    I do not feel good    Cough or wheeze    Chest feels tight    Wake up at night   1. Keep taking your Green Zone medications  2. Start taking your rescue medicine:    every 20 minutes for up to 1 hour. Then every 4 hours for 24-48 hours.  3. If you stay in the Yellow Zone for more than 12-24 hours, contact your doctor.  4. If you do not return to the Green Zone in 12-24 hours or you get worse, start taking your oral steroid medicine if prescribed by your provider.           RED ZONE Medical Alert - Get Help  I have ANY of these:    I feel awful    Medicine is not helping    Breathing getting harder    Trouble walking or talking    Nose opens wide to breathe       1. Take your rescue medicine NOW  2. If your provider has prescribed an oral steroid medicine, start taking it NOW  3. Call your doctor NOW  4. If you are still in the Red Zone after 20 minutes and you have not reached your  doctor:    Take your rescue medicine again and    Call 911 or go to the emergency room right away    See your regular doctor within 2 weeks of an Emergency Room or Urgent Care visit for follow-up treatment.          Annual Reminders:  Meet with Asthma Educator,  Flu Shot in the Fall, consider Pneumonia Vaccination for patients with asthma (aged 19 and older).    Pharmacy:    [facility] [place], [place] - [address] [place] [address]  [facility] [id number] [place], [place] - [address] [place] [address]    Electronically signed by Zamzam Webster MD   Date: 11/15/22                      Asthma Triggers  How To Control Things That Make Your Asthma Worse    Triggers are things that make your asthma worse.  Look at the list below to help you find your triggers and what you can do about them.  You can help prevent asthma flare-ups by staying away from your triggers.      Trigger                                                          What you can do   Cigarette Smoke  Tobacco smoke can make asthma worse. Do not allow smoking in your home, car or around you.  Be sure no one smokes at a child s day care or school.  If you smoke, ask your health care provider for ways to help you quit.  Ask family members to quit too.  Ask your health care provider for a referral to Quit Plan to help you quit smoking, or call 5-381-293-PLAN.     Colds, Flu, Bronchitis  These are common triggers of asthma. Wash your hands often.  Don t touch your eyes, nose or mouth.  Get a flu shot every year.     Dust Mites  These are tiny bugs that live in cloth or carpet. They are too small to see. Wash sheets and blankets in hot water every week.   Encase pillows and mattress in dust mite proof covers.  Avoid having carpet if you can. If you have carpet, vacuum weekly.   Use a dust mask and HEPA vacuum.   Pollen and Outdoor Mold  Some people are allergic to trees, grass, or weed pollen, or molds. Try to keep your windows closed.  Limit time out doors when pollen count is  high.   Ask you health care provider about taking medicine during allergy season.     Animal Dander  Some people are allergic to skin flakes, urine or saliva from pets with fur or feathers. Keep pets with fur or feathers out of your home.    If you can t keep the pet outdoors, then keep the pet out of your bedroom.  Keep the bedroom door closed.  Keep pets off cloth furniture and away from stuffed toys.     Mice, Rats, and Cockroaches   Some people are allergic to the waste from these pests.   Cover food and garbage.  Clean up spills and food crumbs.  Store grease in the refrigerator.   Keep food out of the bedroom.   Indoor Mold  This can be a trigger if your home has high moisture. Fix leaking faucets, pipes, or other sources of water.   Clean moldy surfaces.  Dehumidify basement if it is damp and smelly.   Smoke, Strong Odors, and Sprays  These can reduce air quality. Stay away from strong odors and sprays, such as perfume, powder, hair spray, paints, smoke incense, paint, cleaning products, candles and new carpet.   Exercise or Sports  Some people with asthma have this trigger. Be active!  Ask your doctor about taking medicine before sports or exercise to prevent symptoms.    Warm up for 5-10 minutes before and after sports or exercise.     Other Triggers of Asthma  Cold air:  Cover your nose and mouth with a scarf.  Sometimes laughing or crying can be a trigger.  Some medicines and food can trigger asthma.

## 2022-11-15 NOTE — ASSESSMENT & PLAN NOTE
Due to drug overdose while she was in the hospital on a 72 hr hold for suicide attempt. No prior history  Of seizures in the past. This happened 2020. Has not had an episode since. Currently not on treatment

## 2022-11-15 NOTE — ASSESSMENT & PLAN NOTE
Struggling with relapse. Just out of inpatient treatment about a week ago. Sober since 10/5/22. Currently on naltrexone.  Lives with  who is supportive. Did not tolerate Abilify. Was started on Strattera and lamictal.  Has been stable on cymbalta for depression.

## 2022-11-16 ENCOUNTER — TELEPHONE (OUTPATIENT)
Dept: GASTROENTEROLOGY | Facility: CLINIC | Age: 52
End: 2022-11-16

## 2022-11-16 NOTE — TELEPHONE ENCOUNTER
Screening Questions  BLUE  KIND OF PREP RED  LOCATION [review exclusion criteria] GREEN  SEDATION TYPE        y Are you active on mychart?       Tanvir Peterson PA-C Ordering/Referring Provider?        [employer] What type of coverage do you have?      n Have you had a positive covid test in the last 90 days?     31.8 1. BMI  [BMI 40+ - review exclusion criteria]    y  2. Are you able to give consent for your medical care? [IF NO,RN REVIEW]        n  3. Are you taking any prescription pain medications on a routine schedule?        3a. EXTENDED PREP What kind of prescription?     y a;lcohol 4. Do you have any chemical dependencies such as alcohol, street drugs, or methadone?    y 5. Do you have any history of post-traumatic stress syndrome, severe anxiety or history of psychosis?      **If yes 3- 5 , please schedule with MAC sedation.**          IF YES TO ANY 6 - 10 - HOSPITAL SETTING ONLY.     n 6.   Do you need assistance transferring?     n 7.   Have you had a heart or lung transplant?    n 8.   Are you currently on dialysis?   n 9.   Do you use daily home oxygen?   n 10. Do you take nitroglycerin?   10a.  If yes, how often?     11. [FEMALES]  n Are you currently pregnant?    11a.  If yes, how many weeks? [ Greater than 12 weeks, OR NEEDED]    n 12. Do you have Pulmonary Hypertension? *NEED PAC APPT AT [facility]*     n 13. [review exclusion criteria]  Do you have any implantable devices in your body (pacemaker, defib, LVAD)?    n 14. In the past 6 months, have you had any heart related issues including cardiomyopathy or heart attack?     14a.  If yes, did it require cardiac stenting if so when?     n 15. Have you had a stroke or Transient ischemic attack (TIA - aka  mini stroke ) within 6 months?      n 16. Do you have mod to severe Obstructive Sleep Apnea?  [Hospital only - Ok at [facility]]    n 17. Do you have SEVERE AND UNCONTROLLED asthma? *NEED PAC APPT AT [facility]*     n 18. Are you currently taking any blood  "thinners?     18a. If yes, inform patient to \"follow up w/ ordering provider for bridging instructions.\"    n 19. Do you take the medication Phentermine?    19a. If yes, \"Hold for 7 days before procedure.  Please consult your prescribing provider if you have questions about holding this medication.\"     n  20. Do you have chronic kidney disease?      n  21. Do you have a diagnosis of diabetes?     n  22. On a regular basis do you go 3-5 days between bowel movements?      23. Preferred LOCAL Pharmacy for Pre Prescription    [ LIST ONLY ONE PHARMACY]       [id number] - [place], [place] - [address] [place] [address] SW        - CLOSING REMINDERS -    Informed patient they will need an adult    Cannot take any type of public or medical transportation alone    Conscious Sedation- Needs  for 6 hours after the procedure       MAC/General-Needs  for 24 hours after procedure    Pre-Procedure Covid test to be completed [[facility] PCR Testing Required]    Confirmed Nurse will call to complete assessment       - SCHEDULING DETAILS -     Yamileth  Surgeon    1/24/23  Date of Procedure  Lower Endoscopy [Colonoscopy]  Type of Procedure Scheduled  [facility]-If you answer yes to questions #8, #20, #21Which Colonoscopy Prep was Sent?     MAC Sedation Type     n PAC / Pre-op Required         Additional comments:            " Composite Graft Text: The defect edges were debeveled with a #15 scalpel blade.  Given the location of the defect, shape of the defect, the proximity to free margins and the fact the defect was full thickness a composite graft was deemed most appropriate.  The defect was outline and then transferred to the donor site.  A full thickness graft was then excised from the donor site. The graft was then placed in the primary defect, oriented appropriately and then sutured into place.  The secondary defect was then repaired using a primary closure.

## 2022-11-17 LAB — LAMOTRIGINE SERPL-MCNC: <0.9 UG/ML

## 2022-11-18 LAB
BKR LAB AP GYN ADEQUACY: NORMAL
BKR LAB AP GYN INTERPRETATION: NORMAL
BKR LAB AP HPV REFLEX: NORMAL
BKR LAB AP PREVIOUS ABNORMAL: NORMAL
PATH REPORT.COMMENTS IMP SPEC: NORMAL
PATH REPORT.COMMENTS IMP SPEC: NORMAL
PATH REPORT.RELEVANT HX SPEC: NORMAL
PYRIDOXAL PHOS SERPL-SCNC: 45.1 NMOL/L

## 2022-11-21 ENCOUNTER — HOSPITAL ENCOUNTER (OUTPATIENT)
Dept: MAMMOGRAPHY | Facility: CLINIC | Age: 52
Discharge: HOME | End: 2022-11-21
Attending: PHYSICIAN ASSISTANT | Admitting: PHYSICIAN ASSISTANT
Payer: COMMERCIAL

## 2022-11-21 DIAGNOSIS — Z12.31 VISIT FOR SCREENING MAMMOGRAM: ICD-10-CM

## 2022-11-21 DIAGNOSIS — Z12.31 ENCOUNTER FOR SCREENING MAMMOGRAM FOR BREAST CANCER: ICD-10-CM

## 2022-11-21 PROCEDURE — 77067 SCR MAMMO BI INCL CAD: CPT

## 2022-11-22 LAB
HUMAN PAPILLOMA VIRUS 16 DNA: NEGATIVE
HUMAN PAPILLOMA VIRUS 18 DNA: NEGATIVE
HUMAN PAPILLOMA VIRUS FINAL DIAGNOSIS: NORMAL
HUMAN PAPILLOMA VIRUS OTHER HR: NEGATIVE

## 2022-11-27 DIAGNOSIS — F41.1 GAD (GENERALIZED ANXIETY DISORDER): ICD-10-CM

## 2022-11-27 DIAGNOSIS — F33.0 MAJOR DEPRESSIVE DISORDER, RECURRENT EPISODE, MILD (H): ICD-10-CM

## 2022-11-27 DIAGNOSIS — F10.10 ETOH ABUSE: ICD-10-CM

## 2022-11-27 RX ORDER — DULOXETIN HYDROCHLORIDE 60 MG/1
CAPSULE, DELAYED RELEASE ORAL
Qty: 90 CAPSULE | Refills: 3 | Status: SHIPPED | OUTPATIENT
Start: 2022-11-27 | End: 2023-12-27

## 2022-11-27 RX ORDER — NALTREXONE HYDROCHLORIDE 50 MG/1
50 TABLET, FILM COATED ORAL DAILY
Qty: 90 TABLET | Refills: 3 | Status: SHIPPED | OUTPATIENT
Start: 2022-11-27 | End: 2023-12-27

## 2022-11-29 ENCOUNTER — MYC REFILL (OUTPATIENT)
Dept: FAMILY MEDICINE | Facility: OTHER | Age: 52
End: 2022-11-29

## 2022-11-29 DIAGNOSIS — F10.21 ALCOHOL USE DISORDER, MODERATE, IN EARLY REMISSION (H): ICD-10-CM

## 2022-11-29 DIAGNOSIS — F33.0 MAJOR DEPRESSIVE DISORDER, RECURRENT EPISODE, MILD (H): ICD-10-CM

## 2022-12-01 RX ORDER — ATOMOXETINE 40 MG/1
80 CAPSULE ORAL DAILY
Qty: 180 CAPSULE | Refills: 0 | Status: SHIPPED | OUTPATIENT
Start: 2022-12-01 | End: 2023-02-25

## 2022-12-01 RX ORDER — LAMOTRIGINE 25 MG/1
50 TABLET ORAL DAILY
Qty: 180 TABLET | Refills: 0 | Status: SHIPPED | OUTPATIENT
Start: 2022-12-01 | End: 2023-01-17

## 2022-12-20 DIAGNOSIS — R56.9 SEIZURE (H): ICD-10-CM

## 2022-12-20 RX ORDER — GABAPENTIN 100 MG/1
100 CAPSULE ORAL 3 TIMES DAILY
Qty: 270 CAPSULE | Refills: 0 | Status: SHIPPED | OUTPATIENT
Start: 2022-12-20 | End: 2023-03-22

## 2022-12-20 NOTE — TELEPHONE ENCOUNTER
Pending Prescriptions:                       Disp   Refills    gabapentin (NEURONTIN) 100 MG capsule [Pha*270 ca*0        Sig: Take 1 capsule (100 mg) by mouth 3 times daily    Routing refill request to provider for review/approval because:  Drug not on the [facility] refill protocol

## 2022-12-27 DIAGNOSIS — F41.1 ANXIETY STATE: ICD-10-CM

## 2022-12-27 RX ORDER — PROPRANOLOL HYDROCHLORIDE 20 MG/1
20 TABLET ORAL 2 TIMES DAILY
Qty: 180 TABLET | Refills: 3 | Status: SHIPPED | OUTPATIENT
Start: 2022-12-27 | End: 2023-02-13

## 2023-01-17 ENCOUNTER — MYC REFILL (OUTPATIENT)
Dept: FAMILY MEDICINE | Facility: OTHER | Age: 53
End: 2023-01-17
Payer: COMMERCIAL

## 2023-01-17 ENCOUNTER — MYC MEDICAL ADVICE (OUTPATIENT)
Dept: FAMILY MEDICINE | Facility: OTHER | Age: 53
End: 2023-01-17
Payer: COMMERCIAL

## 2023-01-17 DIAGNOSIS — F33.0 MAJOR DEPRESSIVE DISORDER, RECURRENT EPISODE, MILD (H): ICD-10-CM

## 2023-01-17 DIAGNOSIS — F10.21 ALCOHOL USE DISORDER, MODERATE, IN EARLY REMISSION (H): ICD-10-CM

## 2023-01-17 RX ORDER — LAMOTRIGINE 25 MG/1
50 TABLET ORAL DAILY
Qty: 180 TABLET | Refills: 3 | Status: SHIPPED | OUTPATIENT
Start: 2023-01-17 | End: 2023-02-13
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
## 2023-01-24 ENCOUNTER — ANESTHESIA EVENT (OUTPATIENT)
Dept: GASTROENTEROLOGY | Facility: CLINIC | Age: 53
End: 2023-01-24
Payer: COMMERCIAL

## 2023-01-24 ENCOUNTER — HOSPITAL ENCOUNTER (OUTPATIENT)
Facility: CLINIC | Age: 53
Discharge: HOME | End: 2023-01-24
Attending: SPECIALIST | Admitting: SPECIALIST
Payer: COMMERCIAL

## 2023-01-24 ENCOUNTER — ANESTHESIA (OUTPATIENT)
Dept: GASTROENTEROLOGY | Facility: CLINIC | Age: 53
End: 2023-01-24
Payer: COMMERCIAL

## 2023-01-24 VITALS
OXYGEN SATURATION: 100 % | RESPIRATION RATE: 16 BRPM | SYSTOLIC BLOOD PRESSURE: 119 MMHG | TEMPERATURE: 98.1 F | HEART RATE: 81 BPM | DIASTOLIC BLOOD PRESSURE: 76 MMHG

## 2023-01-24 LAB — COLONOSCOPY: NORMAL

## 2023-01-24 PROCEDURE — 45380 COLONOSCOPY AND BIOPSY: CPT | Performed by: SPECIALIST

## 2023-01-24 PROCEDURE — 370N000017 HC ANESTHESIA TECHNICAL FEE, PER MIN: Performed by: SPECIALIST

## 2023-01-24 PROCEDURE — 258N000003 HC RX IP 258 OP 636: Performed by: NURSE ANESTHETIST, CERTIFIED REGISTERED

## 2023-01-24 PROCEDURE — 88305 TISSUE EXAM BY PATHOLOGIST: CPT | Mod: TC | Performed by: SPECIALIST

## 2023-01-24 PROCEDURE — 250N000011 HC RX IP 250 OP 636: Performed by: NURSE ANESTHETIST, CERTIFIED REGISTERED

## 2023-01-24 PROCEDURE — 250N000009 HC RX 250: Performed by: NURSE ANESTHETIST, CERTIFIED REGISTERED

## 2023-01-24 PROCEDURE — 250N000009 HC RX 250: Performed by: SPECIALIST

## 2023-01-24 PROCEDURE — 45385 COLONOSCOPY W/LESION REMOVAL: CPT | Mod: PT | Performed by: SPECIALIST

## 2023-01-24 PROCEDURE — 88305 TISSUE EXAM BY PATHOLOGIST: CPT | Mod: 26 | Performed by: PATHOLOGY

## 2023-01-24 PROCEDURE — 45380 COLONOSCOPY AND BIOPSY: CPT | Mod: 59 | Performed by: SPECIALIST

## 2023-01-24 RX ORDER — PROPOFOL 10 MG/ML
INJECTION, EMULSION INTRAVENOUS CONTINUOUS PRN
Status: DISCONTINUED | OUTPATIENT
Start: 2023-01-24 | End: 2023-01-24

## 2023-01-24 RX ORDER — LIDOCAINE 40 MG/G
CREAM TOPICAL
Status: DISCONTINUED | OUTPATIENT
Start: 2023-01-24 | End: 2023-01-25 | Stop reason: HOSPADM

## 2023-01-24 RX ORDER — PROPOFOL 10 MG/ML
INJECTION, EMULSION INTRAVENOUS PRN
Status: DISCONTINUED | OUTPATIENT
Start: 2023-01-24 | End: 2023-01-24

## 2023-01-24 RX ORDER — LIDOCAINE HYDROCHLORIDE 20 MG/ML
INJECTION, SOLUTION INFILTRATION; PERINEURAL PRN
Status: DISCONTINUED | OUTPATIENT
Start: 2023-01-24 | End: 2023-01-24

## 2023-01-24 RX ORDER — SODIUM CHLORIDE, SODIUM LACTATE, POTASSIUM CHLORIDE, CALCIUM CHLORIDE 600; 310; 30; 20 MG/100ML; MG/100ML; MG/100ML; MG/100ML
INJECTION, SOLUTION INTRAVENOUS CONTINUOUS
Status: DISCONTINUED | OUTPATIENT
Start: 2023-01-24 | End: 2023-01-25 | Stop reason: HOSPADM

## 2023-01-24 RX ADMIN — LIDOCAINE HYDROCHLORIDE 40 MG: 20 INJECTION, SOLUTION INFILTRATION; PERINEURAL at 12:51

## 2023-01-24 RX ADMIN — PROPOFOL 150 MCG/KG/MIN: 10 INJECTION, EMULSION INTRAVENOUS at 12:51

## 2023-01-24 RX ADMIN — SODIUM CHLORIDE, POTASSIUM CHLORIDE, SODIUM LACTATE AND CALCIUM CHLORIDE: 600; 310; 30; 20 INJECTION, SOLUTION INTRAVENOUS at 12:31

## 2023-01-24 RX ADMIN — PROPOFOL 50 MG: 10 INJECTION, EMULSION INTRAVENOUS at 12:53

## 2023-01-24 RX ADMIN — LIDOCAINE HYDROCHLORIDE 1 ML: 10 INJECTION, SOLUTION EPIDURAL; INFILTRATION; INTRACAUDAL; PERINEURAL at 12:31

## 2023-01-24 RX ADMIN — PROPOFOL 80 MG: 10 INJECTION, EMULSION INTRAVENOUS at 12:51

## 2023-01-24 ASSESSMENT — ACTIVITIES OF DAILY LIVING (ADL)
ADLS_ACUITY_SCORE: 35

## 2023-01-24 ASSESSMENT — LIFESTYLE VARIABLES: TOBACCO_USE: 1

## 2023-01-24 ASSESSMENT — ENCOUNTER SYMPTOMS: SEIZURES: 1

## 2023-01-24 NOTE — ANESTHESIA CARE TRANSFER NOTE
Patient: Kesha David    Procedure: Procedure(s):  COLONOSCOPY, WITH POLYPECTOMY AND BIOPSY  COLONOSCOPY, FLEXIBLE, WITH LESION REMOVAL USING SNARE       Diagnosis: Screen for colon cancer [Z12.11]  Diagnosis Additional Information: No value filed.    Anesthesia Type:   MAC     Note:    Oropharynx: oropharynx clear of all foreign objects and spontaneously breathing  Level of Consciousness: drowsy  Oxygen Supplementation: face mask    Independent Airway: airway patency satisfactory and stable  Dentition: dentition unchanged  Vital Signs Stable: post-procedure vital signs reviewed and stable  Report to RN Given: handoff report given  Patient transferred to: Phase II    Handoff Report: Identifed the Patient, Identified the Reponsible Provider, Reviewed the pertinent medical history, Discussed the surgical course, Reviewed Intra-OP anesthesia mangement and issues during anesthesia, Set expectations for post-procedure period and Allowed opportunity for questions and acknowledgement of understanding      Vitals:  Vitals Value Taken Time   BP     Temp     Pulse     Resp     SpO2         Electronically Signed By: MARANDA Turner CRNA  January 24, 2023  1:10 PM

## 2023-01-24 NOTE — DISCHARGE INSTRUCTIONS
[facility]    Home Care Following Endoscopy          Activity:  You have just undergone an endoscopic procedure usually performed with conscious sedation.  Do not work or operate machinery (including a car) for at least 12 hours.    I encourage you to walk and attempt to pass this air as soon as possible.    Diet:  Return to the diet you were on before your procedure but eat lightly for the first 12-24 hours.  Drink plenty of water.  Resume any regular medications unless otherwise advised by your physician.  Please begin any new medication prescribed as a result of your procedure as directed by your physician.   If you had any biopsy or polyp removed please refrain from aspirin or aspirin products for 2 days.  If on Coumadin please restart as instructed by your physician.   Pain:  You may take Tylenol as needed for pain.  Expected Recovery:  You can expect some mild abdominal fullness and/or discomfort due to the air used to inflate your intestinal tract. It is also normal to have a mild sore throat after upper endoscopy.    Call Your Physician if You Have:  After Colonoscopy:  Worsening persisting abdominal pain which is worse with activity.  Fevers (>101 degrees F), chills or shakes.  Passage of continued blood with bowel movements.   Any questions or concerns about your recovery, please call [phone] or after hours [phone] Nurse Advice Line.    Follow-up Care:  You did have polyps/biopsy tissue sample(s) removed.  The polyps/biopsy tissue sample(s) will be sent to pathology.  You should receive letter in your My Chart from Dr. Russell with your results within 1-2 weeks. If you do not participate in My Chart a physical letter will come in the mail in 2-3 weeks.  Please call if you have not received a notification of your results.  If asked to return to clinic please make an appointment 1 week after your procedure.  Call [phone].

## 2023-01-24 NOTE — ANESTHESIA POSTPROCEDURE EVALUATION
Patient: Kesha David    Procedure: Procedure(s):  COLONOSCOPY, WITH POLYPECTOMY AND BIOPSY  COLONOSCOPY, FLEXIBLE, WITH LESION REMOVAL USING SNARE       Anesthesia Type:  MAC    Note:  Disposition: Outpatient   Postop Pain Control: Uneventful            Sign Out: Well controlled pain   PONV: No   Neuro/Psych: Uneventful            Sign Out: Acceptable/Baseline neuro status   Airway/Respiratory: Uneventful            Sign Out: Acceptable/Baseline resp. status   CV/Hemodynamics: Uneventful            Sign Out: Acceptable CV status   Other NRE: NONE   DID A NON-ROUTINE EVENT OCCUR? No    Event details/Postop Comments:  Pt was happy with anesthesia care.  No complications.  I will follow up with the pt if needed.           Last vitals:  Vitals Value Taken Time   /76 01/24/23 1340   Temp     Pulse 71 01/24/23 1340   Resp     SpO2 99 % 01/24/23 1326   Vitals shown include unvalidated device data.    Electronically Signed By: MARANDA Turner CRNA  January 24, 2023  1:52 PM
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
## 2023-01-26 LAB
PATH REPORT.COMMENTS IMP SPEC: NORMAL
PATH REPORT.COMMENTS IMP SPEC: NORMAL
PATH REPORT.FINAL DX SPEC: NORMAL
PATH REPORT.GROSS SPEC: NORMAL
PATH REPORT.MICROSCOPIC SPEC OTHER STN: NORMAL
PATH REPORT.RELEVANT HX SPEC: NORMAL
PHOTO IMAGE: NORMAL

## 2023-02-13 ENCOUNTER — OFFICE VISIT (OUTPATIENT)
Dept: FAMILY MEDICINE | Facility: OTHER | Age: 53
End: 2023-02-13
Payer: COMMERCIAL

## 2023-02-13 VITALS
OXYGEN SATURATION: 97 % | RESPIRATION RATE: 18 BRPM | TEMPERATURE: 97.6 F | BODY MASS INDEX: 31.5 KG/M2 | HEIGHT: 66 IN | DIASTOLIC BLOOD PRESSURE: 82 MMHG | WEIGHT: 196 LBS | HEART RATE: 100 BPM | SYSTOLIC BLOOD PRESSURE: 130 MMHG

## 2023-02-13 DIAGNOSIS — F41.1 ANXIETY STATE: ICD-10-CM

## 2023-02-13 DIAGNOSIS — F90.0 ATTENTION DEFICIT HYPERACTIVITY DISORDER (ADHD), PREDOMINANTLY INATTENTIVE TYPE: ICD-10-CM

## 2023-02-13 DIAGNOSIS — Z87.891 PERSONAL HISTORY OF TOBACCO USE: ICD-10-CM

## 2023-02-13 DIAGNOSIS — R56.9 SEIZURE (H): Primary | ICD-10-CM

## 2023-02-13 DIAGNOSIS — E66.9 OBESITY (BMI 30-39.9): ICD-10-CM

## 2023-02-13 DIAGNOSIS — F10.21 ALCOHOL USE DISORDER, MODERATE, IN EARLY REMISSION (H): ICD-10-CM

## 2023-02-13 DIAGNOSIS — F33.1 MAJOR DEPRESSIVE DISORDER, RECURRENT EPISODE, MODERATE (H): ICD-10-CM

## 2023-02-13 DIAGNOSIS — A42.89 CUTANEOUS ACTINOMYCOTIC INFECTION: ICD-10-CM

## 2023-02-13 DIAGNOSIS — E65 ABDOMINAL PANNUS: ICD-10-CM

## 2023-02-13 DIAGNOSIS — F33.0 MAJOR DEPRESSIVE DISORDER, RECURRENT EPISODE, MILD (H): ICD-10-CM

## 2023-02-13 DIAGNOSIS — F17.200 NICOTINE DEPENDENCE, UNCOMPLICATED, UNSPECIFIED NICOTINE PRODUCT TYPE: ICD-10-CM

## 2023-02-13 DIAGNOSIS — J45.30 MILD PERSISTENT ASTHMA WITHOUT COMPLICATION: ICD-10-CM

## 2023-02-13 DIAGNOSIS — Z98.84 S/P GASTRIC BYPASS: ICD-10-CM

## 2023-02-13 DIAGNOSIS — I10 HYPERTENSION GOAL BP (BLOOD PRESSURE) < 140/90: ICD-10-CM

## 2023-02-13 DIAGNOSIS — L73.2 HIDRADENITIS SUPPURATIVA: ICD-10-CM

## 2023-02-13 LAB
ALBUMIN SERPL BCG-MCNC: 4.3 G/DL (ref 3.5–5.2)
ALP SERPL-CCNC: 103 U/L (ref 35–104)
ALT SERPL W P-5'-P-CCNC: 17 U/L (ref 10–35)
ANION GAP SERPL CALCULATED.3IONS-SCNC: 10 MMOL/L (ref 7–15)
AST SERPL W P-5'-P-CCNC: 17 U/L (ref 10–35)
BILIRUB SERPL-MCNC: 0.2 MG/DL
BUN SERPL-MCNC: 9.3 MG/DL (ref 6–20)
CALCIUM SERPL-MCNC: 9.6 MG/DL (ref 8.6–10)
CHLORIDE SERPL-SCNC: 101 MMOL/L (ref 98–107)
CREAT SERPL-MCNC: 0.7 MG/DL (ref 0.51–0.95)
DEPRECATED HCO3 PLAS-SCNC: 27 MMOL/L (ref 22–29)
ERYTHROCYTE [DISTWIDTH] IN BLOOD BY AUTOMATED COUNT: 12.9 % (ref 10–15)
GFR SERPL CREATININE-BSD FRML MDRD: >90 ML/MIN/1.73M2
GLUCOSE SERPL-MCNC: 81 MG/DL (ref 70–99)
HCT VFR BLD AUTO: 41.9 % (ref 35–47)
HGB BLD-MCNC: 14.6 G/DL (ref 11.7–15.7)
MCH RBC QN AUTO: 30.7 PG (ref 26.5–33)
MCHC RBC AUTO-ENTMCNC: 34.8 G/DL (ref 31.5–36.5)
MCV RBC AUTO: 88 FL (ref 78–100)
PLATELET # BLD AUTO: 232 10E3/UL (ref 150–450)
POTASSIUM SERPL-SCNC: 4.4 MMOL/L (ref 3.4–5.3)
PROT SERPL-MCNC: 7.2 G/DL (ref 6.4–8.3)
RBC # BLD AUTO: 4.76 10E6/UL (ref 3.8–5.2)
SODIUM SERPL-SCNC: 138 MMOL/L (ref 136–145)
WBC # BLD AUTO: 7.1 10E3/UL (ref 4–11)

## 2023-02-13 PROCEDURE — 99214 OFFICE O/P EST MOD 30 MIN: CPT | Performed by: PHYSICIAN ASSISTANT

## 2023-02-13 PROCEDURE — G0296 VISIT TO DETERM LDCT ELIG: HCPCS | Performed by: PHYSICIAN ASSISTANT

## 2023-02-13 PROCEDURE — 80053 COMPREHEN METABOLIC PANEL: CPT | Performed by: PHYSICIAN ASSISTANT

## 2023-02-13 PROCEDURE — 36415 COLL VENOUS BLD VENIPUNCTURE: CPT | Performed by: PHYSICIAN ASSISTANT

## 2023-02-13 PROCEDURE — 85027 COMPLETE CBC AUTOMATED: CPT | Performed by: PHYSICIAN ASSISTANT

## 2023-02-13 RX ORDER — FLUTICASONE FUROATE AND VILANTEROL 200; 25 UG/1; UG/1
1 POWDER RESPIRATORY (INHALATION) DAILY
Qty: 60 EACH | Refills: 11 | Status: SHIPPED | OUTPATIENT
Start: 2023-02-13 | End: 2023-12-27

## 2023-02-13 RX ORDER — GUANFACINE 1 MG/1
TABLET ORAL
Qty: 48 TABLET | Refills: 0 | Status: SHIPPED | OUTPATIENT
Start: 2023-02-13 | End: 2023-03-22

## 2023-02-13 RX ORDER — CLINDAMYCIN PHOSPHATE 10 UG/ML
LOTION TOPICAL 2 TIMES DAILY PRN
Qty: 60 ML | Refills: 1 | Status: SHIPPED | OUTPATIENT
Start: 2023-02-13 | End: 2024-05-14

## 2023-02-13 RX ORDER — LAMOTRIGINE 25 MG/1
50 TABLET ORAL DAILY
Qty: 180 TABLET | Refills: 3 | Status: SHIPPED | OUTPATIENT
Start: 2023-02-13 | End: 2023-12-27

## 2023-02-13 RX ORDER — PROPRANOLOL HYDROCHLORIDE 20 MG/1
20 TABLET ORAL 2 TIMES DAILY
Qty: 180 TABLET | Refills: 3 | Status: SHIPPED | OUTPATIENT
Start: 2023-02-13 | End: 2023-05-01

## 2023-02-13 RX ORDER — DOXYCYCLINE 100 MG/1
100 CAPSULE ORAL 2 TIMES DAILY
Qty: 20 CAPSULE | Refills: 0 | Status: SHIPPED | OUTPATIENT
Start: 2023-02-13 | End: 2023-02-23

## 2023-02-13 RX ORDER — PHENTERMINE HYDROCHLORIDE 37.5 MG/1
37.5 TABLET ORAL
Qty: 90 TABLET | Refills: 1 | Status: SHIPPED | OUTPATIENT
Start: 2023-02-13 | End: 2023-06-26

## 2023-02-13 RX ORDER — PHENTERMINE HYDROCHLORIDE 37.5 MG/1
37.5 TABLET ORAL
Qty: 90 TABLET | Refills: 1 | Status: SHIPPED | OUTPATIENT
Start: 2023-02-13 | End: 2023-02-13

## 2023-02-13 ASSESSMENT — PAIN SCALES - GENERAL: PAINLEVEL: NO PAIN (0)

## 2023-02-13 NOTE — PROGRESS NOTES
Assessment & Plan     Seizure (H)  Alcohol use disorder, moderate, in early remission (H)  Major depressive disorder, recurrent episode, moderate (H)  Patient presents to review medications from her alcohol use disorder and depression as well as mood stabilization with Lamictal.  She was off of this for a little bit and definitely noticed a difference with respect to her mood.  Refilled medications as previously done and we will have her see adult mental health for further consideration of testing.  - lamoTRIgine (LAMICTAL) 25 MG tablet; Take 2 tablets (50 mg) by mouth daily  - Adult Mental Health  Referral; Future      Nicotine dependence, uncomplicated, unspecified nicotine product type  Strongly recommended cessation.  Advised screening CT for lung cancer.  - [place] Quit Partner Referral; Future    Hypertension goal BP (blood pressure) < 140/90  Good control of this at this point.  Follow-up in 6 months encouraged.  Refilled medications up until that time.    Cutaneous actinomycotic infection  Hidradenitis suppurativa  Abdominal pannus  S/P gastric bypass  Obesity (BMI 30-39.9)  Risks discussed with respect to medication and her current side effect profile related to abdominal pannus.  We did discuss phentermine.  Advised trial of this and follow-up again in 6 to 8 weeks to check on success.  Antibiotics to try to help with her actinomycotic infection risks.  - Adult Plastic Surgery  Referral; Future  - doxycycline hyclate (VIBRAMYCIN) 100 MG capsule; Take 1 capsule (100 mg) by mouth 2 times daily for 10 days  - CBC with platelets; Future  - Comprehensive metabolic panel (BMP + Alb, Alk Phos, ALT, AST, Total. Bili, TP); Future  - clindamycin (CLEOCIN T) 1 % external lotion; Apply topically 2 times daily as needed  - phentermine (ADIPEX-P) 37.5 MG tablet; Take 1 tablet (37.5 mg) by mouth every morning (before breakfast)  - Comprehensive metabolic panel (BMP + Alb, Alk Phos, ALT, AST, Total. Bili,  TP)  - CBC with platelets    Major depressive disorder, recurrent episode, mild (H)  Anxiety state  - lamoTRIgine (LAMICTAL) 25 MG tablet; Take 2 tablets (50 mg) by mouth daily  - propranolol (INDERAL) 20 MG tablet; Take 1 tablet (20 mg) by mouth 2 times daily  - Adult Mental Health  Referral; Future    Mild persistent asthma without complication  Refilled medications as requested.  Follow-up in 6 months.  - fluticasone-vilanterol (BREO ELLIPTA) 200-25 MCG/ACT inhaler; Inhale 1 puff into the lungs daily  - Adult Mental Health  Referral; Future    Attention deficit hyperactivity disorder (ADHD), predominantly inattentive type  Additional trial medication for concerns around ADHD inattentive type.  Mental health testing for ADHD strongly recommended.  - guanFACINE (TENEX) 1 MG tablet; Take 1 tablet (1 mg) by mouth At Bedtime for 14 days, THEN 2 tablets (2 mg) At Bedtime for 17 days.  - Adult Mental Health  Referral; Future      Nicotine/Tobacco Cessation:  She reports that she has been smoking cigarettes. She started smoking about 37 years ago. She has a 16.50 pack-year smoking history. She uses smokeless tobacco.  Nicotine/Tobacco Cessation Plan:   Advised tobacco cessation and placed referral to help her with this.    Return in about 6 weeks (around 3/27/2023) for Medication Recheck, recheck of current condition.    Tanvir Ybarra PA-C  [facility]   Kesha is a 52 year old, presenting for the following health issues:  Recheck Medication      History of Present Illness       Mental Health Follow-up:  Patient presents to follow-up on Depression & Anxiety.Patient's depression since last visit has been:  Better  The patient is not having other symptoms associated with depression.  Patient's anxiety since last visit has been:  Better  The patient is not having other symptoms associated with anxiety.  Any significant life events: No  Patient is not feeling  "anxious or having panic attacks.  Patient has no concerns about alcohol or drug use.    Reason for visit:  ADD medication treatment/Depression/Anxiety    She eats 0-1 servings of fruits and vegetables daily.She consumes 0 sweetened beverage(s) daily.She exercises with enough effort to increase her heart rate 9 or less minutes per day.  She exercises with enough effort to increase her heart rate 3 or less days per week. She is missing 1 dose(s) of medications per week.  She is not taking prescribed medications regularly due to remembering to take.       Review of Systems   Constitutional, HEENT, cardiovascular, pulmonary, GI, , musculoskeletal, neuro, skin, endocrine and psych systems are negative, except as otherwise noted.      Objective    /82   Pulse 100   Temp 97.6  F (36.4  C) (Temporal)   Resp 18   Ht 1.67 m (5' 5.75\")   Wt 88.9 kg (196 lb)   LMP 12/05/2017   SpO2 97%   BMI 31.88 kg/m    Body mass index is 31.88 kg/m .  Physical Exam   GENERAL: healthy, alert and no distress  NECK: no adenopathy, no asymmetry, masses, or scars and thyroid normal to palpation  RESP: lungs clear to auscultation - no rales, rhonchi or wheezes  CV: regular rate and rhythm, normal S1 S2, no S3 or S4, no murmur, click or rub, no peripheral edema and peripheral pulses strong  ABDOMEN: soft, nontender, no hepatosplenomegaly, no masses and bowel sounds normal  MS: no gross musculoskeletal defects noted, no edema  SKIN: no suspicious lesions or rashes to exposed visible skin today.  NEURO: Normal strength and tone, mentation intact and speech normal  PSYCH: mentation appears normal, affect normal/bright    Labs pending at this point in time.            Lung Cancer Screening Shared Decision Making Visit     Kesha David, a 52 year old female, is eligible for lung cancer screening    History   Smoking Status     Every Day     Packs/day: 0.50     Years: 33.00     Types: Cigarettes     Start date: 1/1/1986     Last " attempt to quit: 7/1/2013   Smokeless Tobacco     Current       I have discussed with patient the risks and benefits of screening for lung cancer with low-dose CT.     The risks include:    radiation exposure: one low dose chest CT has as much ionizing radiation as about 15 chest x-rays, or 6 months of background radiation living in [place]      false positives: most findings/nodules are NOT cancer, but some might still require additional diagnostic evaluation, including biopsy    over-diagnosis: some slow growing cancers that might never have been clinically significant will be detected and treated unnecessarily     The benefit of early detection of lung cancer is contingent upon adherence to annual screening or more frequent follow up if indicated.     Furthermore, to benefit from screening, Kesha must be willing and able to undergo diagnostic procedures, if indicated. Although no specific guide is available for determining severity of comorbidities, it is reasonable to withhold screening in patients who have greater mortality risk from other diseases.     We did discuss that the best way to prevent lung cancer is to not smoke.    Some patients may value a numeric estimation of lung cancer risk when evaluating if lung cancer screening is right for them, here is one calculator:    ShouldIScreen

## 2023-02-13 NOTE — PATIENT INSTRUCTIONS
How to Quit Smoking  Smoking is a hard habit to break. About 50% of all people who have ever smoked have been able to quit. Most people who still smoke want to quit. Here are some of the best ways to stop smoking.     Keep in mind the health benefits of quitting  The health benefits of quitting start right away. They keep improving the longer you go without smoking. Knowing this can help inspire you to stay on track. These benefits occur at any age. If you are 17 or 70, quitting is a good choice. Some of the health benefits after your last cigarette include:     After 20 minutes: Your blood pressure and pulse return to normal.    After 8 hours: Your oxygen levels return to normal.    After 2 days: Your ability to smell and taste start to improve as damaged nerves regrow.    After 2 to 3 weeks: Your circulation and lung function improve.    After 1 to 9 months: Your coughing, congestion, and shortness of breath decrease. Your tiredness decreases.    After 1 year: Your risk of heart attack decreases by 50%.    After 5 years: Your risk of lung cancer decreases by 50%. Your risk of stroke becomes the same as a nonsmoker s.  Go cold turkey  Most former smokers quit cold turkey. This means stopping all at once. Trying to cut back slowly often doesn't work as well. This may be because it continues the habit of smoking. Also you may inhale more smoke while smoking fewer cigarettes. This leads to the same amount of nicotine in your body.   Get support  Support programs can be a big help, especially for heavy smokers. These groups offer lectures, ways to change behavior, and peer support. Here are some ways to find a support program:     Free national quitline 800-QUIT-NOW (574-859-9182)    [facility] quit-smoking programs    American Lung Association 802-551-3991    American Cancer Society 428-320-7649  Support at home is important too. Family and friends can offer praise and reassurance. If the smoker in your life finds  it hard to quit, encourage them to keep trying.   Try over-the-counter medicine  Nicotine replacement therapy may make it easier to quit. Some aids are available without a prescription. These include a nicotine patch, gum, and lozenges. But it's best to use these under the care of your healthcare provider. The skin patch gives a steady supply of nicotine. Nicotine gum and lozenges give short-time doses of low levels of nicotine. Both methods reduce the craving for cigarettes. If you have nausea, vomiting, dizziness, weakness, or a fast heartbeat, stop using these products. See your provider.   Ask about prescription medicine  After reviewing your smoking patterns and past attempts to quit, your healthcare provider may offer a prescription medicine such as bupropion, varenicline, a nicotine inhaler, or nasal spray. Each has advantages and side effects. Your provider can review these with you.   Keep trying  Most smokers make many attempts at quitting before they succeed. It s important not to give up.   To learn more  For more on how to quit smoking, try these resources:     www.cdc.gov/tobacco/quit_smoking/ 800-QUIT-NOW (071-915-1929)    www.smokefree.gov 877-44U-QUIT (319-329-7612)    www.lung.org/stop-smoking/ 800-LUNGUSA (237-320-8596)  [employer] last reviewed this educational content on 12/1/2019 2000-2021 [employer]. All rights reserved. This information is not intended as a substitute for professional medical care. Always follow your healthcare professional's instructions.        Lung Cancer Screening   Frequently Asked Questions  If you are at high-risk for lung cancer, getting screened with low-dose computed tomography (LDCT) every year can help save your life. This handout offers answers to some of the most common questions about lung cancer screening. If you have other questions, please call 9-898-7-UMPCancer ([phone]).     What is it?  Lung cancer screening uses special X-ray  technology to create an image of your lung tissue. The exam is quick and easy and takes less than 10 seconds. We don t give you any medicine or use any needles. You can eat before and after the exam. You don t need to change your clothes as long as the clothing on your chest doesn t contain metal. But, you do need to be able to hold your breath for at least 6 seconds during the exam.    What is the goal of lung cancer screening?  The goal of lung cancer screening is to save lives. Many times, lung cancer is not found until a person starts having physical symptoms. Lung cancer screening can help detect lung cancer in the earliest stages when it may be easier to treat.    Who should be screened for lung cancer?  We suggest lung cancer screening for anyone who is at high-risk for lung cancer. You are in the high-risk group if you:      are between the ages of 55 and 79, and    have smoked at least 1 pack of cigarettes a day for 20 or more years, and    still smoke or have quit within the past 15 years.    However, if you have a new cough or shortness of breath, you should talk to your doctor before being screened.    Why does it matter if I have symptoms?  Certain symptoms can be a sign that you have a condition in your lungs that should be checked and treated by your doctor. These symptoms include fever, chest pain, a new or changing cough, shortness of breath that you have never felt before, coughing up blood or unexplained weight loss. Having any of these symptoms can greatly affect the results of lung cancer screening.       Should all smokers get an LDCT lung cancer screening exam?  It depends. Lung cancer screening is for a very specific group of men and women who have a history of heavy smoking over a long period of time (see  Who should be screened for lung cancer  above).  I am in the high-risk group, but have been diagnosed with cancer in the past. Is LDCT lung cancer screening right for me?  In some cases,  you should not have LDCT lung screening, such as when your doctor is already following your cancer with CT scan studies. Your doctor will help you decide if LDCT lung screening is right for you.  Do I need to have a screening exam every year?  Yes. If you are in the high-risk group described earlier, you should get an LDCT lung cancer screening exam every year until you are 79, or are no longer willing or able to undergo screening and possible procedures to diagnose and treat lung cancer.  How effective is LDCT at preventing death from lung cancer?  Studies have shown that LDCT lung cancer screening can lower the risk of death from lung cancer by 20 percent in people who are at high-risk.  What are the risks?  There are some risks and limitations of LDCT lung cancer screening. We want to make sure you understand the risks and benefits, so please let us know if you have any questions. Your doctor may want to talk with you more about these risks.    Radiation exposure: As with any exam that uses radiation, there is a very small increased risk of cancer. The amount of radiation in LDCT is small--about the same amount a person would get from a mammogram. Your doctor orders the exam when he or she feels the potential benefits outweigh the risks.    False negatives: No test is perfect, including LDCT. It is possible that you may have a medical condition, including lung cancer, that is not found during your exam. This is called a false negative result.    False positives and more testing: LDCT very often finds something in the lung that could be cancer, but in fact is not. This is called a false positive result. False positive tests often cause anxiety. To make sure these findings are not cancer, you may need to have more tests. These tests will be done only if you give us permission. Sometimes patients need a treatment that can have side effects, such as a biopsy. For more information on false positives, see  What can I  expect from the results?     Findings not related to lung cancer: Your LDCT exam also takes pictures of areas of your body next to your lungs. In a very small number of cases, the CT scan will show an abnormal finding in one of these areas, such as your kidneys, adrenal glands, liver or thyroid. This finding may not be serious, but you may need more tests. Your doctor can help you decide what other tests you may need, if any.  What can I expect from the results?  About 1 out of 4 LDCT exams will find something that may need more tests. Most of the time, these findings are lung nodules. Lung nodules are very small collections of tissue in the lung. These nodules are very common, and the vast majority--more than 97 percent--are not cancer (benign). Most are normal lymph nodes or small areas of scarring from past infections.  But, if a small lung nodule is found to be cancer, the cancer can be cured more than 90 percent of the time. To know if the nodule is cancer, we may need to get more images before your next yearly screening exam. If the nodule has suspicious features (for example, it is large, has an odd shape or grows over time), we will refer you to a specialist for further testing.  Will my doctor also get the results?  Yes. Your doctor will get a copy of your results.  Is it okay to keep smoking now that there s a cancer screening exam?  No. Tobacco is one of the strongest cancer-causing agents. It causes not only lung cancer, but other cancers and cardiovascular (heart) diseases as well. The damage caused by smoking builds over time. This means that the longer you smoke, the higher your risk of disease. While it is never too late to quit, the sooner you quit, the better.  Where can I find help to quit smoking?  The best way to prevent lung cancer is to stop smoking. If you have already quit smoking, congratulations and keep it up! For help on quitting smoking, please call [facility] at 6-139-QUITNOW  (1-601.214.1088) or the American Cancer Society at 1-549.578.8403 to find local resources near you.  One-on-one health coaching:  If you d prefer to work individually with a health care provider on tobacco cessation, we offer:      Medication Therapy Management:  Our specially trained pharmacists work closely with you and your doctor to help you quit smoking.  Call [phone] or [phone] (toll free).

## 2023-02-20 ENCOUNTER — MYC MEDICAL ADVICE (OUTPATIENT)
Dept: FAMILY MEDICINE | Facility: OTHER | Age: 53
End: 2023-02-20
Payer: COMMERCIAL

## 2023-02-20 ENCOUNTER — HOSPITAL ENCOUNTER (OUTPATIENT)
Dept: CT IMAGING | Facility: CLINIC | Age: 53
Discharge: HOME | End: 2023-02-20
Attending: PHYSICIAN ASSISTANT | Admitting: PHYSICIAN ASSISTANT
Payer: COMMERCIAL

## 2023-02-20 ENCOUNTER — VIRTUAL VISIT (OUTPATIENT)
Dept: PSYCHOLOGY | Facility: CLINIC | Age: 53
End: 2023-02-20
Payer: COMMERCIAL

## 2023-02-20 DIAGNOSIS — Z87.891 PERSONAL HISTORY OF TOBACCO USE: ICD-10-CM

## 2023-02-20 DIAGNOSIS — F10.21 ALCOHOL USE DISORDER, MODERATE, IN EARLY REMISSION (H): ICD-10-CM

## 2023-02-20 DIAGNOSIS — E65 ABDOMINAL PANNUS: Primary | ICD-10-CM

## 2023-02-20 DIAGNOSIS — F33.1 MAJOR DEPRESSIVE DISORDER, RECURRENT EPISODE, MODERATE (H): Primary | ICD-10-CM

## 2023-02-20 DIAGNOSIS — F41.1 GAD (GENERALIZED ANXIETY DISORDER): ICD-10-CM

## 2023-02-20 PROCEDURE — 71271 CT THORAX LUNG CANCER SCR C-: CPT

## 2023-02-20 PROCEDURE — 90837 PSYTX W PT 60 MINUTES: CPT | Mod: VID | Performed by: MARRIAGE & FAMILY THERAPIST

## 2023-02-20 ASSESSMENT — PATIENT HEALTH QUESTIONNAIRE - PHQ9
10. IF YOU CHECKED OFF ANY PROBLEMS, HOW DIFFICULT HAVE THESE PROBLEMS MADE IT FOR YOU TO DO YOUR WORK, TAKE CARE OF THINGS AT HOME, OR GET ALONG WITH OTHER PEOPLE: NOT DIFFICULT AT ALL
SUM OF ALL RESPONSES TO PHQ QUESTIONS 1-9: 5
SUM OF ALL RESPONSES TO PHQ QUESTIONS 1-9: 5

## 2023-02-20 ASSESSMENT — ANXIETY QUESTIONNAIRES
GAD7 TOTAL SCORE: 7
6. BECOMING EASILY ANNOYED OR IRRITABLE: SEVERAL DAYS
8. IF YOU CHECKED OFF ANY PROBLEMS, HOW DIFFICULT HAVE THESE MADE IT FOR YOU TO DO YOUR WORK, TAKE CARE OF THINGS AT HOME, OR GET ALONG WITH OTHER PEOPLE?: SOMEWHAT DIFFICULT
4. TROUBLE RELAXING: SEVERAL DAYS
2. NOT BEING ABLE TO STOP OR CONTROL WORRYING: SEVERAL DAYS
5. BEING SO RESTLESS THAT IT IS HARD TO SIT STILL: SEVERAL DAYS
GAD7 TOTAL SCORE: 7
1. FEELING NERVOUS, ANXIOUS, OR ON EDGE: SEVERAL DAYS
7. FEELING AFRAID AS IF SOMETHING AWFUL MIGHT HAPPEN: SEVERAL DAYS
3. WORRYING TOO MUCH ABOUT DIFFERENT THINGS: SEVERAL DAYS
7. FEELING AFRAID AS IF SOMETHING AWFUL MIGHT HAPPEN: SEVERAL DAYS
IF YOU CHECKED OFF ANY PROBLEMS ON THIS QUESTIONNAIRE, HOW DIFFICULT HAVE THESE PROBLEMS MADE IT FOR YOU TO DO YOUR WORK, TAKE CARE OF THINGS AT HOME, OR GET ALONG WITH OTHER PEOPLE: SOMEWHAT DIFFICULT
GAD7 TOTAL SCORE: 7

## 2023-02-20 NOTE — PROGRESS NOTES
[facility]   Mental Health & Addiction Services     Progress Note - Initial Visit    Patient  Name:  Kesha David Date: 23           Service Type: Individual     Visit Start Time: 11 AM  Visit End Time: 12 PM    Visit #: 6    Attendees: Client and Spouse / Significant Other    Service Modality:  Video Visit:      Provider verified identity through the following two step process.  Patient provided:  Patient , Patient address and Patient was verified at admission/transfer    Telemedicine Visit: The patient's condition can be safely assessed and treated via synchronous audio and visual telemedicine encounter.      Reason for Telemedicine Visit: Patient has requested telehealth visit    Originating Site (Patient Location): Patient's home    Distant Site (Provider Location): [facility]    Consent:  The patient/guardian has verbally consented to: the potential risks and benefits of telemedicine (video visit) versus in person care; bill my insurance or make self-payment for services provided; and responsibility for payment of non-covered services.     Patient would like the video invitation sent by:  My Chart    Mode of Communication:  Video Conference via Amwell    As the provider I attest to compliance with applicable laws and regulations related to telemedicine.     DATA:   Extended Session (53+ minutes): PROLONGED SERVICE IN THE OUTPATIENT SETTING REQUIRING DIRECT (FACE-TO-FACE) PATIENT CONTACT BEYOND THE USUAL SERVICE:    - Treatment protocol required additional time to complete, due to the nature of diagnosis being treated.  See Interventions section for details   Interactive Complexity: No   Crisis: No     Presenting Concerns/  Current Stressors:    Progress Since Last Session (Related to Symptoms / Goals / Homework):   Symptoms: No change Depressive mood continues to respond to psychotropic medicatiosn. She stopped drinkng alcohol in 2022 and  "completed inpatient and aftercare programs, and returns to deal with these issues and marital concerns. Anxiety symptoms are daily, and she often finds herself frustrated and avoiding expression of anger.     Homework: Completed in session      Episode of Care Goals: Minimal progress - ACTION (Actively working towards change); Intervened by reinforcing change plan / affirming steps taken     Current / Ongoing Stressors and Concerns:   Client presented as a 53-year-old   cisgender heterosexual female from [place], [place] addressing major depressive disorder recurrent moderate, generalized anxiety disorder, and alcohol use disorder, in early remission (2022). 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     Client returns after 4 months of completing substance abuse counseling at [facility] in [place], [place]. She sreported also completing outpatient aftercare. Her request coming back to session is to address her feelings of \"being stuck in neutral\" with her relationship with her spouse Rubio. She stated she becomes anxious and avoidant, and her partner becomes defensive during their moments of disconnection. She stated she has trust to rebuild and he was expected to become more emotionally available within their interactions. She feels her Lamictal has helped reduce irritability and improve mindfulness and decision making. Her attention continues to show signs suggestive of ADHD or possible dyslexia. She was triggered recently by the death and  service for Susy (the woman she thought was making advances towards her  Rubio). She still had fear that her  may make an appearance, and she did not want him to go-loyalty bind. She also is reporting disconnection between Rubio and she have been continued and she is questioning her sexual responsiveness in general. Therapist provided education regarding " libido and we will discuss at length next time in conjoint sessions.     Treatment plan to be updated at next appointment.      Treatment Objective(s) Addressed in This Session:   attend AA at least 1 times in the next week  use at least 5 coping skills for anxiety management in the next 10 weeks  Increase interest, engagement, and pleasure in doing things  Decrease frequency and intensity of feeling down, depressed, hopeless  Improve quantity and quality of night time sleep / decrease daytime naps  Feel less tired and more energy during the day   Improve diet, appetite, mindful eating, and / or meal planning  Identify negative self-talk and behaviors: challenge core beliefs, myths, and actions  Improve concentration, focus, and mindfulness in daily activities   Feel less fidgety, restless or slow in daily activities / interpersonal interactions  Decrease thoughts that you'd be better off dead or of suicide / self-harm       Intervention:   Motivational Interviewing  MI Intervention: Expressed Empathy/Understanding, Supported Autonomy, Collaboration, Evocation, Permission to raise concern or advise, Open-ended questions, Reflections: simple and complex, Change talk (evoked) and Reframe   Change Talk Expressed by the Patient: Desire to change Ability to change Reasons to change Need to change Committment to change Activation Taking steps  Provider Response to Change Talk: E - Evoked more info from patient about behavior change, A - Affirmed patient's thoughts, decisions, or attempts at behavior change, R - Reflected patient's change talk and S - Summarized patient's change talk statements     Assessments completed prior to visit:  The following assessments were completed by patient for this visit:  PHQ9:   PHQ-9 SCORE 2/9/2021 5/14/2021 8/25/2022 9/26/2022 10/10/2022 11/14/2022 2/20/2023   PHQ-9 Total Score - - - - - - -   PHQ-9 Total Score MyChart - - 12 (Moderate depression) 6 (Mild depression) 19 (Moderately  severe depression) 2 (Minimal depression) 5 (Mild depression)   PHQ-9 Total Score 10 5 12 6 19 2 5     GAD2:   HONEY-2 9/8/2022 10/10/2022 10/10/2022 2/20/2023   Feeling nervous, anxious, or on edge 1 2 2 2   Not being able to stop or control worrying 1 3 3 1   HONEY-2 Total Score 2 5 5 3     CAGE-AID:   CAGE-AID Total Score 8/6/2020 8/18/2020   Total Score 3 4     PROMIS 10-Global Health (only subscores and total score):   PROMIS-10 Scores Only 9/8/2022 2/20/2023   Global Mental Health Score 10 13   Global Physical Health Score 14 16   PROMIS TOTAL - SUBSCORES 24 29     [place] Suicide Severity Rating Scale (Lifetime/Recent)  [place] Suicide Severity Rating (Lifetime/Recent) 8/6/2020 8/18/2020   Wish to be Dead (Lifetime) - Yes   Comments - after relapse/divorce threat in late July 2020   Non-Specific Active Suicidal Thoughts (Lifetime) - Yes   Non-Specific Active Suicidal Thought Description (Lifetime) - Hopelessness/depression - passive   Most Severe Ideation Rating (Lifetime) - 3   Most Severe Ideation Description (Lifetime) - Overdosed on alcohol, SSRIs and a few opioids 7/29/20   Frequency (Lifetime) - 4   Duration (Lifetime) - 4   Controllability (Lifetime) - 5   Protective Factors  (Lifetime) - 3   Reasons for Ideation (Lifetime) - 3   Q1 Wished to be Dead (Past Month) yes -   Q2 Suicidal Thoughts (Past Month) yes -   Q3 Suicidal Thought Method yes -   Q4 Suicidal Intent without Specific Plan no -   Q5 Suicide Intent with Specific Plan yes -   Q6 Suicide Behavior (Lifetime) yes -   RETIRED: 1. Wish to be Dead (Recent) - Yes   RETIRED: Wish to be Dead Description (Recent) - after relapse/divorce threat in late July 2020   RETIRED: 2. Non-Specific Active Suicidal Thoughts (Recent) - Yes   Non-Specific Active Suicidal Thought Description (Recent) - Recent suicide attempt on 7/29/20   3. Active Suicidal Ideation with any Methods (Not Plan) Without Intent to Act (Lifetime) - No   RETIRED: 3. Active Suicidal  Ideation with any Methods (Not Plan) Without Intent to Act (Recent) - No   RETIRE: 4. Active Suicidal Ideation with Some Intent to Act, Without Specific Plan (Lifetime) - No   4. Active Suicidal Ideation with Some Intent to Act, Without Specific Plan (Recent) - No   RETIRE: 5. Active Suicidal Ideation with Specific Plan and Intent (Lifetime) - Yes   RETIRE: Active Suicidal Ideation with Specific Plan and Intent Description (Lifetime) - Attempt 7/29/20   RETIRED: 5. Active Suicidal Ideation with Specific Plan and Intent (Recent) - Yes   RETIRED: Active Suicidal Ideation with Specific Plan and Intent Description (Recent) - Attempt 7/29/20   Most Severe Ideation Rating (Past Month) - 3   Most Severe Ideation Description (Past Month) - Overdosed on alcohol, SSRIs and a few opioids 7/29/20   Frequency (Past Month) - 4   Duration (Past Month) - 4   Controllability (Past Month) - 5   Protective Factors (Past Month) - 3   Reasons for Ideation (Past Month) - 3   Actual Attempt (Lifetime) - Yes   Actual Attempt Description (Lifetime) - 7/29/20 after relapsing on alcohol   Total Number of Actual Attempts (Lifetime) - 3   Comments - 2012, 2014 & 2020 all when drinking   Actual Attempt (Past 3 Months) - Yes   Actual Attempt Description (Past 3 Months) - 7/29/20 after relapsing on alcohol   Total Number of Actual Attempts (Past 3 Months) - 1   Has subject engaged in non-suicidal self-injurious behavior? (Lifetime) - Yes   Comments - Cutting as a teen   Has subject engaged in non-suicidal self-injurious behavior? (Past 3 Months) - No   Interrupted Attempts (Lifetime) - No   Interrupted Attempts (Past 3 Months) - No   Aborted or Self-Interrupted Attempt (Lifetime) - No   Aborted or Self-Interrupted Attempt (Past 3 Months) - No   Preparatory Acts or Behavior (Lifetime) - No   Preparatory Acts or Behavior (Past 3 Months) - No   Most Recent Attempt Date - 50182   Most Recent Attempt Actual Lethality Code - 3   Most Lethal Attempt  "Actual Lethality Code - 3   Initial/First Attempt Date - 35307   Initial/First Attempt Actual Lethality Code - 3         ASSESSMENT: Current Emotional / Mental Status (status of significant symptoms):   Risk status (Self / Other harm or suicidal ideation)   Patient denies current fears or concerns for personal safety.   Patient reports the following current or recent suicidal ideation or behaviors: passive suicidal ideation is intermittent during a typical week, without intention, plan, or attempt..   Patient reports current or recent homicidal ideation or behaviors including She has fantasies of her brother-in-law's girlfriend having disappointment or \"bad things happening\" but no urge, plan, intent, action towards harm of her nor homicidal ideation.   Patient denies current or recent self injurious behavior or ideation.   Patient denies other safety concerns.   Patient reports there has been no change in risk factors since their last session.     Patient reports there has been no change in protective factors since their last session.     Recommended that patient call 911 or go to the local ED should there be a change in any of these risk factors.        A safety and risk management plan has been developed including: Patient has no change in safety concerns. Committed to safety and agreed to follow previously developed safety plan. Patient consented to co-developed safety plan.  Safety and risk management plan was completed.  Patient agreed to use safety plan should any safety concerns arise.  A copy was given to the patient.  Patient consented to co-developed safety plan on 8/25/2022.  Safety and risk management plan was reviewed.   Patient agreed to use safety plan should any safety concerns arise.  A copy was made available to the patient.       Appearance:   Appropriate    Eye Contact:   Good    Psychomotor Behavior: Slowed    Attitude:   Cooperative  Guarded     Orientation:   Person Place Time " Situation   Speech    Rate / Production: Normal/ Responsive    Volume:  Normal    Mood:    Depressed  Sad  Apathetic   Affect:    Subdued  Worrisome    Thought Content:  Clear  Magical Ideations    Thought Form:  Coherent    Insight:    Good      Medication Review:   No changes to current psychiatric medication(s)     Medication Compliance:   Yes     Changes in Health Issues:   None reported     Chemical Use Review:   Substance Use: Problem use continues with no change since last session, Stage of Change: Action        Tobacco Use: No change in amount of tobacco use since last session.  Action    Diagnosis:  1. Major depressive disorder, recurrent episode, moderate (H)    2. HONEY (generalized anxiety disorder)    3. Alcohol use disorder, moderate, in early remission (H)        Collateral Reports Completed:   Not Applicable    PLAN: (Patient Tasks / Therapist Tasks / Other)  Client will discuss her feelings and needs with her partner including her need for feeling defended or protected from the emotional threat she feels from the affair partner.  She agreed to also address tension reduction strategies, emotional coaching and logic replacements of distortion cognitions.  Client will maintain sobriety from substances and will also initiate attendance in online AA groups or local AA meetings when possible.  Therapist will continue addressing emotion regulation strategies.          Cornelius Mckeon, JESSICA                                                           ______________________________________________________________________    Individual Treatment Plan    Patient's Name: Kesha David  YOB: 1970    Date of Creation: 9/8/2022  Date Treatment Plan Last Reviewed/Revised: 9/8/2022--To be updated at next appointment.    DSM5 Diagnoses:  1. Major depressive disorder, recurrent episode, moderate (H)    2. HONEY (generalized anxiety disorder)    3. Alcohol use disorder, moderate, in early remission (H)          Psychosocial / Contextual Factors: Past hospitalizations for substance misuse and mental health crises, current conflict with marital partner, support system conflicts  PROMIS (reviewed every 90 days): See above    Referral / Collaboration:  Referral to another professional/service is not indicated at this time..    Anticipated number of session for this episode of care: 6-9 sessions  Anticipation frequency of session: Weekly  Anticipated Duration of each session: 38-52 minutes  Treatment plan will be reviewed in 90 days or when goals have been changed.       MeasurableTreatment Goal(s) related to diagnosis / functional impairment(s)  Goal 1: Patient will maintain sobriety, decrease depressive symptoms, and increase personal confidence and stress/emotion regulation techniques.    I will know I've met my goal when not reported.      Objective #A (Patient Action)    Patient will attend AA at least 1 times in the next week  use at least 5 coping skills for anxiety management in the next 10 weeks  Increase interest, engagement, and pleasure in doing things  Decrease frequency and intensity of feeling down, depressed, hopeless  Improve quantity and quality of night time sleep / decrease daytime naps  Feel less tired and more energy during the day   Improve diet, appetite, mindful eating, and / or meal planning  Identify negative self-talk and behaviors: challenge core beliefs, myths, and actions  Improve concentration, focus, and mindfulness in daily activities   Feel less fidgety, restless or slow in daily activities / interpersonal interactions  Decrease thoughts that you'd be better off dead or of suicide / self-harm  Status: New - Date: 9/8/2022     Intervention(s)  Therapist will teach emotional regulation skills. Cognitive behavior therapy, emotion regulation strategies, and assertive communication will all be utilized to support treatment plan goals..    Patient has reviewed and agreed to the above  plan.      JESSICA Rojas  September 8, 2022        Safety Plan: To be completed upon next session as client did not complete fully.  Adult Short Safety Plan:   Name: Kesha David  YOB: 1970  Date: August 25, 2022   My primary care provider: Tanvir Peterson  My primary care clinic: [facility]  My prescriber: Dr. Tanvir Benavidez  Other care team support: JESSICA Schwartz   My Triggers:  Relationship conflict With extended family including brother-in-law's partner, Home environment Disruption due to, Medical Health Escalating rumination and Substance Use Relapse if occurring     Additional People, Places, and Things that I can access for support: Spouse, friends, clinical provider         What is important to me and makes life worth living: To be determined.         GREEN    Good Control  1. I feel good  2. No suicidal thoughts   3. Can work, sleep and play      Action Steps  1. Self-care: balanced meals, exercising, sleep practices, etc.  2. Take your medications as prescribed.  3. Continue meetings with therapist and prescriber.  4.  Do the healthy things that I enjoy.  5.  Stay active           YELLOW  Getting Worse  I have ANY of these:  1. I do not feel good  2. Difficulty Concentrating  3. Sleep is changing  4. Increase/Change in my thoughts to hurt self and/or others, but I can still manage and not act on it.   5. Not taking care of self.  6.  Feeling triggered             Action Steps (in addition to the above):  1. Inform your therapist and psychiatric prescriber/PCP.  2. Keep taking your medications as prescribed.    3. Turn to people you can ask for help.  4. Use internal coping strategies -see below.  5. Create safe environment: store firearms for safety, lock and limit medications and notify friends/family of increase in symptoms  6.  Communicate with supportive natural supports in your environment.           RED  Get Help  If I have ANY of these:  1. Current and  uncontrollable thoughts and/or behaviors to hurt self and/or others.   2.  Current and/or uncontrollable thoughts to use substances   Actions to manage my safety  1. Contact your emergency person partner/spouse  2. Call or Text [phone]  3. Call my crisis team- Pedro Pedraza [facility] [phone] [facility]  3. Or Call 911 or go to the emergency room right away  4.  Call crisis hotline        My Internal Coping Strategies include the following:  take a bath, belly breathing, arts and crafts, color, fidget toys, use my coping box, exercise and use my coping skills    [End for Brief Safety Plan]     Safety Concerns  How To Identify Situations That Make Your Mental Health Worse:  Triggers are things that make your mental health worse.  Look at the list below to help you find your triggers and what you can do about them.     1. Identify Early Warning Signs:    Sometimes symptoms return, even when people do their best to stay well. Symptoms can develop over a short period of time with little or no warning, but most of the time they emerge gradually over several weeks.  Early warning signs are changes that people experience when a relapse is starting. Some early warning signs are common and others are not as common.   Common Early Warning Signs:    Feeling tense or nervous, Trouble sleeping -either too much or too little sleep, Feeling depressed or low, Feeling irritable, Trouble concentrating, Urges to harm self, Urges to harm others and Urges to use drugs or alcohol     2. Identify action steps to take when warning signs are noticed:    Taking Action- It is important to take action if you are experiencing early warning signs of a relapse.  The faster you act, the more likely it is that you can avoid a full relapse.  It is helpful to identify several specific ways to cope with symptoms.      The following is my list of symptoms and coping strategies that I can use when they are present:    Symptom Coping  Strategies   Anxiety -Talk with someone in your support system and let him or her know how you are feeling.  -Use relaxation techniques such as deep breathing or imagery.  -Use positive affirmations to counteract negative self-talk such as  I am learning to let go of worry.    Depression - Schedule your day; include activities you have to do and activities you enjoy doing.  - Get some exercise - walk, run, bike, or swim.  - Give yourself credit for even the smallest things you get done.   Sleep Difficulties   - Go to sleep at the same time every day.  - Do something relaxing before bed, such as drinking herbal tea or listening to music.  - Avoid having discussions about upsetting topics before going to bed.   Delusions   - Distract yourself from the disturbing thought by doing something that requires your attention such as a puzzle.  - Check out your beliefs by talking to someone you trust.    Hallucinations   - Use headphones to listen to music.  - Tell voices to  stop  or say to yourself,  I am safe.   - Ignore the hallucinations as much as possible; focus on other things.   Concentration Difficulties - Minimize distractions so there is only one thing for you to focus on at a time.    - Ask the person you are having a conversation with to slow down or repeat things you are unsure of.        Cornelius Mckeon [facility]  August 25, 2022          Answers for HPI/ROS submitted by the patient on 9/26/2022  If you checked off any problems, how difficult have these problems made it for you to do your work, take care of things at home, or get along with other people?: Somewhat difficult  PHQ9 TOTAL SCORE: 6    Answers for HPI/ROS submitted by the patient on 10/10/2022  If you checked off any problems, how difficult have these problems made it for you to do your work, take care of things at home, or get along with other people?: Very difficult  PHQ9 TOTAL SCORE: 19  HONEY 7 TOTAL SCORE: 15    Answers for HPI/ROS submitted by the  patient on 2/20/2023  If you checked off any problems, how difficult have these problems made it for you to do your work, take care of things at home, or get along with other people?: Not difficult at all  PHQ9 TOTAL SCORE: 5  HONEY 7 TOTAL SCORE: 7

## 2023-02-25 DIAGNOSIS — F33.0 MAJOR DEPRESSIVE DISORDER, RECURRENT EPISODE, MILD (H): ICD-10-CM

## 2023-02-25 DIAGNOSIS — F10.21 ALCOHOL USE DISORDER, MODERATE, IN EARLY REMISSION (H): ICD-10-CM

## 2023-02-25 RX ORDER — ATOMOXETINE 40 MG/1
80 CAPSULE ORAL DAILY
Qty: 180 CAPSULE | Refills: 0 | Status: SHIPPED | OUTPATIENT
Start: 2023-02-25 | End: 2023-03-22

## 2023-03-01 ENCOUNTER — VIRTUAL VISIT (OUTPATIENT)
Dept: PSYCHOLOGY | Facility: CLINIC | Age: 53
End: 2023-03-01
Payer: COMMERCIAL

## 2023-03-01 DIAGNOSIS — F41.1 GAD (GENERALIZED ANXIETY DISORDER): ICD-10-CM

## 2023-03-01 DIAGNOSIS — F33.1 MAJOR DEPRESSIVE DISORDER, RECURRENT EPISODE, MODERATE (H): Primary | ICD-10-CM

## 2023-03-01 DIAGNOSIS — F10.21 ALCOHOL USE DISORDER, MODERATE, IN EARLY REMISSION (H): ICD-10-CM

## 2023-03-01 PROCEDURE — 90837 PSYTX W PT 60 MINUTES: CPT | Mod: VID | Performed by: MARRIAGE & FAMILY THERAPIST

## 2023-03-01 NOTE — PROGRESS NOTES
[facility]     Progress Note    Patient  Name:  Kesha David Date: 3/01/23      Service Type: Individual     Visit Start Time:      Visit End Time:     Visit #: 7    Attendees: Client and Spouse / Significant Other    Service Modality:  Video Visit:      Provider verified identity through the following two step process.  Patient provided:  Patient , Patient address and Patient was verified at admission/transfer    Telemedicine Visit: The patient's condition can be safely assessed and treated via synchronous audio and visual telemedicine encounter.      Reason for Telemedicine Visit: Patient has requested telehealth visit    Originating Site (Patient Location): Patient's home    Distant Site (Provider Location): [facility]    Consent:  The patient/guardian has verbally consented to: the potential risks and benefits of telemedicine (video visit) versus in person care; bill my insurance or make self-payment for services provided; and responsibility for payment of non-covered services.     Patient would like the video invitation sent by:  My Chart    Mode of Communication:  Video Conference via Amwell    As the provider I attest to compliance with applicable laws and regulations related to telemedicine.     DATA:   Extended Session (53+ minutes): PROLONGED SERVICE IN THE OUTPATIENT SETTING REQUIRING DIRECT (FACE-TO-FACE) PATIENT CONTACT BEYOND THE USUAL SERVICE:    - Treatment protocol required additional time to complete, due to the nature of diagnosis being treated.  See Interventions section for details   Interactive Complexity: No   Crisis: No     Presenting Concerns/  Current Stressors:    Progress Since Last Session (Related to Symptoms / Goals / Homework):   Symptoms: No change Depressive mood continues to respond to psychotropic medicatiosn. She stopped drinkng alcohol in 2022 and completed inpatient and aftercare programs, and returns to  "deal with these issues and marital concerns. Anxiety symptoms are daily, and she often finds herself frustrated and avoiding expression of anger.     Homework: Completed in session      Episode of Care Goals: Minimal progress - ACTION (Actively working towards change); Intervened by reinforcing change plan / affirming steps taken     Current / Ongoing Stressors and Concerns:   Client presented as a 53-year-old   cisgender heterosexual female from [place], [place] addressing major depressive disorder recurrent moderate, generalized anxiety disorder, and alcohol use disorder, in early remission (October 2022). 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 Client completed substance abuse counseling at [facility] in [place], [place] (November-February).     The client stated, \"I had thoughts of feeling insignificant,\" and described peers from school that were considered close in the past could not remember her, and she thought to herself \"They forgot me.\" Therapist processed this with the client, offering her person centered techniques. She continued to discuss her frustrations with per partner Rubio and how he often tunes her feelings or thoughts out, and interrupts her during conversations.    Therapist identified that she wishes to discuss feelings more openly, have productive dialogue, convey support and mutual respect, and continue to improve her relationship. She indicated these were important things to address in the next few sessions.   Discussion of her intrusive thoughts of how others are validating her brother-in-law (yuri)'s girlfriend (Susy) and showing acceptance of her, rather than confronting her for many disrespectful behaviors or challenging the loyalties of Rubio (her ).    She stated she addresses her thoughts with her , but finds it invalidating, and often becomes frustrated that he does not " "accept how the dynamics have negatively impacted the client.     She was encouraged to identify communication strategies she wishes to develop and ones she wishes to discontinue. Kesha responded with hopes of purposeful communication and interest in her perspective.   She responded positively when presented interventions to address intrusive worries, and demonstrated building insight of her impulse control as she displayed her cuticles and how they have been bitten down \"they need to be perfectly smooth).  She connected her experiences of being invalidated in a personalizing manner (thinking she does not matter enough to others). Therapist challenged her to consider this as mind-reading assumption, and that other likely alternatives exist.       She was asked to identify positive experiences from the past week which included positive discussion and validation from her son (bonding how Rubio started to work excessively and they both lost time with him). She went on to say she wishes to do more things together with Rubio including snowmobiling and fishing, but this was minimized by her frustration that he is consumed with [employer] and cars. Therapist encouraged ongoing curiosity and dialogue as ways of bridging the two.     Kesha agreed to consider conjoint sessions when possible/deeemed helpful as long as he was informed of the formatted expectations: one topic each, open dialogue after each partner feels completely listened to (Zachan approach)    She was encouraged to slow their pattern of her bringing up an issue and him giving her the \"silent treatment,\" leading to a deepening depressive feeling for Kesha. Therapist asked that she consider the growth mindset position rather than reinforcing negative interactions as a never ending pattern of defeat.      Treatment Objective(s) Addressed in This Session:   attend AA at least 1 times in the next week  use at least 5 coping skills for anxiety management in the " next 10 weeks  Increase interest, engagement, and pleasure in doing things  Decrease frequency and intensity of feeling down, depressed, hopeless  Improve quantity and quality of night time sleep / decrease daytime naps  Feel less tired and more energy during the day   Improve diet, appetite, mindful eating, and / or meal planning  Identify negative self-talk and behaviors: challenge core beliefs, myths, and actions  Improve concentration, focus, and mindfulness in daily activities   Feel less fidgety, restless or slow in daily activities / interpersonal interactions  Decrease thoughts that you'd be better off dead or of suicide / self-harm       Intervention:   Motivational Interviewing  MI Intervention: Expressed Empathy/Understanding, Supported Autonomy, Collaboration, Evocation, Permission to raise concern or advise, Open-ended questions, Reflections: simple and complex, Change talk (evoked) and Reframe   Change Talk Expressed by the Patient: Desire to change Ability to change Reasons to change Need to change Committment to change Activation Taking steps  Provider Response to Change Talk: E - Evoked more info from patient about behavior change, A - Affirmed patient's thoughts, decisions, or attempts at behavior change, R - Reflected patient's change talk and S - Summarized patient's change talk statements     Assessments completed prior to visit:  The following assessments were completed by patient for this visit:  PHQ9:   PHQ-9 SCORE 2/9/2021 5/14/2021 8/25/2022 9/26/2022 10/10/2022 11/14/2022 2/20/2023   PHQ-9 Total Score - - - - - - -   PHQ-9 Total Score MyChart - - 12 (Moderate depression) 6 (Mild depression) 19 (Moderately severe depression) 2 (Minimal depression) 5 (Mild depression)   PHQ-9 Total Score 10 5 12 6 19 2 5     GAD2:   HONEY-2 9/8/2022 10/10/2022 10/10/2022 2/20/2023   Feeling nervous, anxious, or on edge 1 2 2 2   Not being able to stop or control worrying 1 3 3 1   HONEY-2 Total Score 2 5 5 3      CAGE-AID:   CAGE-AID Total Score 8/6/2020 8/18/2020   Total Score 3 4     PROMIS 10-Global Health (only subscores and total score):   PROMIS-10 Scores Only 9/8/2022 2/20/2023   Global Mental Health Score 10 13   Global Physical Health Score 14 16   PROMIS TOTAL - SUBSCORES 24 29     [place] Suicide Severity Rating Scale (Lifetime/Recent)  [place] Suicide Severity Rating (Lifetime/Recent) 8/6/2020 8/18/2020   Wish to be Dead (Lifetime) - Yes   Comments - after relapse/divorce threat in late July 2020   Non-Specific Active Suicidal Thoughts (Lifetime) - Yes   Non-Specific Active Suicidal Thought Description (Lifetime) - Hopelessness/depression - passive   Most Severe Ideation Rating (Lifetime) - 3   Most Severe Ideation Description (Lifetime) - Overdosed on alcohol, SSRIs and a few opioids 7/29/20   Frequency (Lifetime) - 4   Duration (Lifetime) - 4   Controllability (Lifetime) - 5   Protective Factors  (Lifetime) - 3   Reasons for Ideation (Lifetime) - 3   Q1 Wished to be Dead (Past Month) yes -   Q2 Suicidal Thoughts (Past Month) yes -   Q3 Suicidal Thought Method yes -   Q4 Suicidal Intent without Specific Plan no -   Q5 Suicide Intent with Specific Plan yes -   Q6 Suicide Behavior (Lifetime) yes -   RETIRED: 1. Wish to be Dead (Recent) - Yes   RETIRED: Wish to be Dead Description (Recent) - after relapse/divorce threat in late July 2020   RETIRED: 2. Non-Specific Active Suicidal Thoughts (Recent) - Yes   Non-Specific Active Suicidal Thought Description (Recent) - Recent suicide attempt on 7/29/20   3. Active Suicidal Ideation with any Methods (Not Plan) Without Intent to Act (Lifetime) - No   RETIRED: 3. Active Suicidal Ideation with any Methods (Not Plan) Without Intent to Act (Recent) - No   RETIRE: 4. Active Suicidal Ideation with Some Intent to Act, Without Specific Plan (Lifetime) - No   4. Active Suicidal Ideation with Some Intent to Act, Without Specific Plan (Recent) - No   RETIRE: 5. Active Suicidal  Ideation with Specific Plan and Intent (Lifetime) - Yes   RETIRE: Active Suicidal Ideation with Specific Plan and Intent Description (Lifetime) - Attempt 7/29/20   RETIRED: 5. Active Suicidal Ideation with Specific Plan and Intent (Recent) - Yes   RETIRED: Active Suicidal Ideation with Specific Plan and Intent Description (Recent) - Attempt 7/29/20   Most Severe Ideation Rating (Past Month) - 3   Most Severe Ideation Description (Past Month) - Overdosed on alcohol, SSRIs and a few opioids 7/29/20   Frequency (Past Month) - 4   Duration (Past Month) - 4   Controllability (Past Month) - 5   Protective Factors (Past Month) - 3   Reasons for Ideation (Past Month) - 3   Actual Attempt (Lifetime) - Yes   Actual Attempt Description (Lifetime) - 7/29/20 after relapsing on alcohol   Total Number of Actual Attempts (Lifetime) - 3   Comments - 2012, 2014 & 2020 all when drinking   Actual Attempt (Past 3 Months) - Yes   Actual Attempt Description (Past 3 Months) - 7/29/20 after relapsing on alcohol   Total Number of Actual Attempts (Past 3 Months) - 1   Has subject engaged in non-suicidal self-injurious behavior? (Lifetime) - Yes   Comments - Cutting as a teen   Has subject engaged in non-suicidal self-injurious behavior? (Past 3 Months) - No   Interrupted Attempts (Lifetime) - No   Interrupted Attempts (Past 3 Months) - No   Aborted or Self-Interrupted Attempt (Lifetime) - No   Aborted or Self-Interrupted Attempt (Past 3 Months) - No   Preparatory Acts or Behavior (Lifetime) - No   Preparatory Acts or Behavior (Past 3 Months) - No   Most Recent Attempt Date - 65589   Most Recent Attempt Actual Lethality Code - 3   Most Lethal Attempt Actual Lethality Code - 3   Initial/First Attempt Date - 65589   Initial/First Attempt Actual Lethality Code - 3         ASSESSMENT: Current Emotional / Mental Status (status of significant symptoms):   Risk status (Self / Other harm or suicidal ideation)   Patient denies current fears or concerns  "for personal safety.   Patient reports the following current or recent suicidal ideation or behaviors: passive suicidal ideation is intermittent during a typical week, without intention, plan, or attempt..   Patient reports current or recent homicidal ideation or behaviors including She has fantasies of her brother-in-law's girlfriend having disappointment or \"bad things happening\" but no urge, plan, intent, action towards harm of her nor homicidal ideation.   Patient denies current or recent self injurious behavior or ideation.   Patient denies other safety concerns.   Patient reports there has been no change in risk factors since their last session.     Patient reports there has been no change in protective factors since their last session.     Recommended that patient call 911 or go to the local ED should there be a change in any of these risk factors.     A safety and risk management plan has been developed including: Patient has no change in safety concerns. Committed to safety and agreed to follow previously developed safety plan. Patient consented to co-developed safety plan.  Safety and risk management plan was completed.  Patient agreed to use safety plan should any safety concerns arise.  A copy was given to the patient.  Patient consented to co-developed safety plan on 8/25/2022.  Safety and risk management plan was reviewed.   Patient agreed to use safety plan should any safety concerns arise.  A copy was made available to the patient.       Appearance:   Appropriate    Eye Contact:   Good    Psychomotor Behavior: Slowed    Attitude:   Cooperative  Guarded     Orientation:   Person Place Time Situation   Speech    Rate / Production: Normal/ Responsive    Volume:  Normal    Mood:    Depressed  Sad  Apathetic   Affect:    Subdued  Worrisome    Thought Content:  Clear  Perseverative Obsessions   Thought Form:  Obsessive    Insight:    Fair      Medication Review:   No changes to current psychiatric " medication(s)     Medication Compliance:   Yes     Changes in Health Issues:   None reported     Chemical Use Review:   Substance Use: Problem use continues with no change since last session, Stage of Change: Action        Tobacco Use: No change in amount of tobacco use since last session.  Action    Diagnosis:  1. Major depressive disorder, recurrent episode, moderate (H)    2. HONEY (generalized anxiety disorder)    3. Alcohol use disorder, moderate, in early remission (H)     Rule out for Obsessive Compulsive disorder (excoriation and symmetry and intrusive thoughts).    Collateral Reports Completed:   Not Applicable    PLAN: (Patient Tasks / Therapist Tasks / Other)  Client will discuss her feelings and needs with her partner including her need for feeling defended or protected from the emotional threat she feels from the affair partner.  She agreed to also address tension reduction strategies, emotional coaching and logic replacements of distortion cognitions.  Client will maintain sobriety from substances and will also initiate attendance in online AA groups or local AA meetings when possible.  Therapist will continue addressing emotion regulation strategies.          Cornelius Mckeon, ADEFT                                                           ______________________________________________________________________    Individual Treatment Plan    Patient's Name: Kesha David  YOB: 1970    Date of Creation: 9/8/2022  Date Treatment Plan Last Reviewed/Revised: 3/1/2023  DSM5 Diagnoses:  1. Major depressive disorder, recurrent episode, moderate (H)    2. HONEY (generalized anxiety disorder)    3. Alcohol use disorder, moderate, in early remission (H)         Psychosocial / Contextual Factors: Past hospitalizations for substance misuse and mental health crises, current conflict with marital partner, support system conflicts  PROMIS (reviewed every 90 days): See above    Referral /  Collaboration:  Referral to another professional/service is not indicated at this time..    Anticipated number of session for this episode of care: 9-12 sessions  Anticipation frequency of session: Weekly  Anticipated Duration of each session: 38-52 minutes  Treatment plan will be reviewed in 90 days or when goals have been changed.       MeasurableTreatment Goal(s) related to diagnosis / functional impairment(s)  Goal 1: Patient will maintain sobriety, decrease depressive symptoms, and increase personal confidence and stress/emotion regulation techniques.    I will know I've met my goal when not reported.      Objective #A (Patient Action)    Patient will attend AA at least 1 times in the next week  use at least 5 coping skills for anxiety management in the next 10 weeks  Increase interest, engagement, and pleasure in doing things  Decrease frequency and intensity of feeling down, depressed, hopeless  Improve quantity and quality of night time sleep / decrease daytime naps  Feel less tired and more energy during the day   Improve diet, appetite, mindful eating, and / or meal planning  Identify negative self-talk and behaviors: challenge core beliefs, myths, and actions  Improve concentration, focus, and mindfulness in daily activities   Feel less fidgety, restless or slow in daily activities / interpersonal interactions  Decrease thoughts that you'd be better off dead or of suicide / self-harm  Increase feelings of connection with spouse Rubio.  Status: Continued - Date(s): 3/1/2023: Fred completed alcohol inpatitent treatment and follow up sober living. She has built insight into her depressive and anxous symptoms, and has been sober from alcohol. She continues to focus on her 's invalidation, which leads to greater sense of depressed mood and frustration. She has been responsive to therapeutic suggestion and is showing willingness to include Rubio in sessions possibly in the future. She is aware of her  "intrusive thoughts and is starting to address her needs more assertively (as opposed to \"numbing out\" with alcohol).   Intervention(s)  Therapist will teach emotional regulation skills. Cognitive behavior therapy, emotion regulation strategies, and assertive communication will all be utilized to support treatment plan goals..    Patient has reviewed and agreed to the above plan.      JESSICA Rojas  3/01/23      Safety Plan: To be completed upon next session as client did not complete fully.  Adult Short Safety Plan:   Name: Kesha David  YOB: 1970  Date: August 25, 2022   My primary care provider: Tanvir Peterson  My primary care clinic: [facility]  My prescriber: Dr. Tanvir Benavidez  Other care team support: JESSICA Schwartz   My Triggers:  Relationship conflict With extended family including brother-in-law's partner, Home environment Disruption due to, Medical Health Escalating rumination and Substance Use Relapse if occurring     Additional People, Places, and Things that I can access for support: Spouse, friends, clinical provider         What is important to me and makes life worth living: To be determined.         GREEN    Good Control  1. I feel good  2. No suicidal thoughts   3. Can work, sleep and play      Action Steps  1. Self-care: balanced meals, exercising, sleep practices, etc.  2. Take your medications as prescribed.  3. Continue meetings with therapist and prescriber.  4.  Do the healthy things that I enjoy.  5.  Stay active           YELLOW  Getting Worse  I have ANY of these:  1. I do not feel good  2. Difficulty Concentrating  3. Sleep is changing  4. Increase/Change in my thoughts to hurt self and/or others, but I can still manage and not act on it.   5. Not taking care of self.  6.  Feeling triggered             Action Steps (in addition to the above):  1. Inform your therapist and psychiatric prescriber/PCP.  2. Keep taking your medications as prescribed.  "   3. Turn to people you can ask for help.  4. Use internal coping strategies -see below.  5. Create safe environment: store firearms for safety, lock and limit medications and notify friends/family of increase in symptoms  6.  Communicate with supportive natural supports in your environment.           RED  Get Help  If I have ANY of these:  1. Current and uncontrollable thoughts and/or behaviors to hurt self and/or others.   2.  Current and/or uncontrollable thoughts to use substances   Actions to manage my safety  1. Contact your emergency person partner/spouse  2. Call or Text [phone]  3. Call my crisis team- [facility] [phone] [facility]  3. Or Call 911 or go to the emergency room right away  4.  Call crisis hotline        My Internal Coping Strategies include the following:  take a bath, belly breathing, arts and crafts, color, fidget toys, use my coping box, exercise and use my coping skills    [End for Brief Safety Plan]     Safety Concerns  How To Identify Situations That Make Your Mental Health Worse:  Triggers are things that make your mental health worse.  Look at the list below to help you find your triggers and what you can do about them.     1. Identify Early Warning Signs:    Sometimes symptoms return, even when people do their best to stay well. Symptoms can develop over a short period of time with little or no warning, but most of the time they emerge gradually over several weeks.  Early warning signs are changes that people experience when a relapse is starting. Some early warning signs are common and others are not as common.   Common Early Warning Signs:    Feeling tense or nervous, Trouble sleeping -either too much or too little sleep, Feeling depressed or low, Feeling irritable, Trouble concentrating, Urges to harm self, Urges to harm others and Urges to use drugs or alcohol     2. Identify action steps to take when warning signs are noticed:    Taking Action- It  is important to take action if you are experiencing early warning signs of a relapse.  The faster you act, the more likely it is that you can avoid a full relapse.  It is helpful to identify several specific ways to cope with symptoms.      The following is my list of symptoms and coping strategies that I can use when they are present:    Symptom Coping Strategies   Anxiety -Talk with someone in your support system and let him or her know how you are feeling.  -Use relaxation techniques such as deep breathing or imagery.  -Use positive affirmations to counteract negative self-talk such as  I am learning to let go of worry.    Depression - Schedule your day; include activities you have to do and activities you enjoy doing.  - Get some exercise - walk, run, bike, or swim.  - Give yourself credit for even the smallest things you get done.   Sleep Difficulties   - Go to sleep at the same time every day.  - Do something relaxing before bed, such as drinking herbal tea or listening to music.  - Avoid having discussions about upsetting topics before going to bed.   Delusions   - Distract yourself from the disturbing thought by doing something that requires your attention such as a puzzle.  - Check out your beliefs by talking to someone you trust.    Hallucinations   - Use headphones to listen to music.  - Tell voices to  stop  or say to yourself,  I am safe.   - Ignore the hallucinations as much as possible; focus on other things.   Concentration Difficulties - Minimize distractions so there is only one thing for you to focus on at a time.    - Ask the person you are having a conversation with to slow down or repeat things you are unsure of.        JESSICA Rojas  August 25, 2022          Answers for HPI/ROS submitted by the patient on 9/26/2022  If you checked off any problems, how difficult have these problems made it for you to do your work, take care of things at home, or get along with other people?: Somewhat  difficult  PHQ9 TOTAL SCORE: 6    Answers for HPI/ROS submitted by the patient on 10/10/2022  If you checked off any problems, how difficult have these problems made it for you to do your work, take care of things at home, or get along with other people?: Very difficult  PHQ9 TOTAL SCORE: 19  HONEY 7 TOTAL SCORE: 15    Answers for HPI/ROS submitted by the patient on 2/20/2023  If you checked off any problems, how difficult have these problems made it for you to do your work, take care of things at home, or get along with other people?: Not difficult at all  PHQ9 TOTAL SCORE: 5  HONEY 7 TOTAL SCORE: 7

## 2023-03-09 ENCOUNTER — VIRTUAL VISIT (OUTPATIENT)
Dept: PSYCHOLOGY | Facility: CLINIC | Age: 53
End: 2023-03-09
Payer: COMMERCIAL

## 2023-03-09 DIAGNOSIS — F10.21 ALCOHOL USE DISORDER, MODERATE, IN EARLY REMISSION (H): ICD-10-CM

## 2023-03-09 DIAGNOSIS — F33.1 MAJOR DEPRESSIVE DISORDER, RECURRENT EPISODE, MODERATE (H): Primary | ICD-10-CM

## 2023-03-09 DIAGNOSIS — F41.1 GAD (GENERALIZED ANXIETY DISORDER): ICD-10-CM

## 2023-03-09 PROCEDURE — 90837 PSYTX W PT 60 MINUTES: CPT | Mod: VID | Performed by: MARRIAGE & FAMILY THERAPIST

## 2023-03-09 NOTE — PROGRESS NOTES
[facility]     Progress Note    Patient  Name:  Kesha David Date: 3/09/23      Service Type: Individual     Visit Start Time:      Visit End Time: 143    Visit #: 8    Attendees: Client and Spouse / Significant Other Rubio     Service Modality:  Video Visit:      Provider verified identity through the following two step process.  Patient provided:  Patient , Patient address and Patient was verified at admission/transfer    Telemedicine Visit: The patient's condition can be safely assessed and treated via synchronous audio and visual telemedicine encounter.      Reason for Telemedicine Visit: Patient has requested telehealth visit    Originating Site (Patient Location): Patient's home    Distant Site (Provider Location): [facility]    Consent:  The patient/guardian has verbally consented to: the potential risks and benefits of telemedicine (video visit) versus in person care; bill my insurance or make self-payment for services provided; and responsibility for payment of non-covered services.     Patient would like the video invitation sent by:  My Chart    Mode of Communication:  Video Conference via Amwell    As the provider I attest to compliance with applicable laws and regulations related to telemedicine.     DATA:   Extended Session (53+ minutes): PROLONGED SERVICE IN THE OUTPATIENT SETTING REQUIRING DIRECT (FACE-TO-FACE) PATIENT CONTACT BEYOND THE USUAL SERVICE:    - Treatment protocol required additional time to complete, due to the nature of diagnosis being treated.  See Interventions section for details   Interactive Complexity: No   Crisis: No     Presenting Concerns/  Current Stressors:    Progress Since Last Session (Related to Symptoms / Goals / Homework):   Symptoms: No change Depressive mood continues to respond to psychotropic medicatiosn. She stopped drinkng alcohol in 2022 and completed inpatient and aftercare programs, and  returns to deal with these issues and marital concerns. Anxiety symptoms are daily, and she often finds herself frustrated and avoiding expression of anger.     Homework: Completed in session      Episode of Care Goals: Minimal progress - ACTION (Actively working towards change); Intervened by reinforcing change plan / affirming steps taken      Current / Ongoing Stressors and Concerns:  Client presented as a 53-year-old   cisgender heterosexual female from [place], [place] addressing major depressive disorder recurrent moderate, generalized anxiety disorder, and alcohol use disorder, in early remission (October 2022). 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 Client completed substance abuse counseling at [facility] in [place], [place] (November-February).     The client discussed how Rubio and herself have struggled with her past drinking behavior, and now that she has established her current sober routine, both partners wished to address their level of trust commitment and repair. Rubio stated he is very alert to any signs and symptoms she may present that would indicate she is drinking again. He stated this has felt like flashbacks. He was provided validation. He acknowledged that time re-adjusting seeing her current positive choices and seeing her consistency increase are indicators to him that she is building trust.     The client then discussed how she has maintained a positive pattern of behaviors regarding sobriety, but is still actively sad and anxious about Rubio's interactions with Susy (her former friend, his former date partner in high school, and his brother's current girlfriend). Kesha stated she wishes Rubio to remain reserved and not share personal things with or around Susy. This was an intrusive suspicion in the past, and was processed as something she wants, so she feels he is sensitive and  protective of her feelings and their relationship.     Therapist intervened with partners when Rubio took exception to Kesha's requests, and was challenged to de-escalate and to offer listening before reacting. This was repeated several times.     Kesha also asked for them to start showing care and consideration by focusing attention to opportunities to connect, which would benefit her mood and her relationship. He defended himself in response, and was guided back by this therapist to listening role. She was able to detail what she needed and how it would benefit them (take an interest in me, spend time with me, have more fun). Rubio was asked to consider these requests.     Therapist then encouraged them to use Gottman-Method dialogue strategies, and to allow each person to finish their messages before sharing their reactions or responding. They were also asked to form routines of connection for starting and ending important conversations with touch or a personally kind gesture of their making.      Treatment Objective(s) Addressed in This Session:   attend AA at least 1 times in the next week  use at least 5 coping skills for anxiety management in the next 10 weeks  Increase interest, engagement, and pleasure in doing things  Decrease frequency and intensity of feeling down, depressed, hopeless  Improve quantity and quality of night time sleep / decrease daytime naps  Feel less tired and more energy during the day   Improve diet, appetite, mindful eating, and / or meal planning  Identify negative self-talk and behaviors: challenge core beliefs, myths, and actions  Improve concentration, focus, and mindfulness in daily activities   Feel less fidgety, restless or slow in daily activities / interpersonal interactions  Decrease thoughts that you'd be better off dead or of suicide / self-harm       Intervention:   Motivational Interviewing  MI Intervention: Expressed Empathy/Understanding, Supported Autonomy,  Collaboration, Evocation, Permission to raise concern or advise, Open-ended questions, Reflections: simple and complex, Change talk (evoked) and Reframe   Change Talk Expressed by the Patient: Desire to change Ability to change Reasons to change Need to change Committment to change Activation Taking steps  Provider Response to Change Talk: E - Evoked more info from patient about behavior change, A - Affirmed patient's thoughts, decisions, or attempts at behavior change, R - Reflected patient's change talk and S - Summarized patient's change talk statements     Assessments completed prior to visit:  The following assessments were completed by patient for this visit:  PHQ9:   PHQ-9 SCORE 2/9/2021 5/14/2021 8/25/2022 9/26/2022 10/10/2022 11/14/2022 2/20/2023   PHQ-9 Total Score - - - - - - -   PHQ-9 Total Score [facility] - - 12 (Moderate depression) 6 (Mild depression) 19 (Moderately severe depression) 2 (Minimal depression) 5 (Mild depression)   PHQ-9 Total Score 10 5 12 6 19 2 5     GAD2:   HONEY-2 9/8/2022 10/10/2022 10/10/2022 2/20/2023   Feeling nervous, anxious, or on edge 1 2 2 2   Not being able to stop or control worrying 1 3 3 1   HONEY-2 Total Score 2 5 5 3     CAGE-AID:   CAGE-AID Total Score 8/6/2020 8/18/2020   Total Score 3 4     PROMIS 10-Global Health (only subscores and total score):   PROMIS-10 Scores Only 9/8/2022 2/20/2023   Global Mental Health Score 10 13   Global Physical Health Score 14 16   PROMIS TOTAL - SUBSCORES 24 29     [place] Suicide Severity Rating Scale (Lifetime/Recent)  [place] Suicide Severity Rating (Lifetime/Recent) 8/6/2020 8/18/2020   Wish to be Dead (Lifetime) - Yes   Comments - after relapse/divorce threat in late July 2020   Non-Specific Active Suicidal Thoughts (Lifetime) - Yes   Non-Specific Active Suicidal Thought Description (Lifetime) - Hopelessness/depression - passive   Most Severe Ideation Rating (Lifetime) - 3   Most Severe Ideation Description (Lifetime) - Overdosed on  alcohol, SSRIs and a few opioids 7/29/20   Frequency (Lifetime) - 4   Duration (Lifetime) - 4   Controllability (Lifetime) - 5   Protective Factors  (Lifetime) - 3   Reasons for Ideation (Lifetime) - 3   Q1 Wished to be Dead (Past Month) yes -   Q2 Suicidal Thoughts (Past Month) yes -   Q3 Suicidal Thought Method yes -   Q4 Suicidal Intent without Specific Plan no -   Q5 Suicide Intent with Specific Plan yes -   Q6 Suicide Behavior (Lifetime) yes -   RETIRED: 1. Wish to be Dead (Recent) - Yes   RETIRED: Wish to be Dead Description (Recent) - after relapse/divorce threat in late July 2020   RETIRED: 2. Non-Specific Active Suicidal Thoughts (Recent) - Yes   Non-Specific Active Suicidal Thought Description (Recent) - Recent suicide attempt on 7/29/20   3. Active Suicidal Ideation with any Methods (Not Plan) Without Intent to Act (Lifetime) - No   RETIRED: 3. Active Suicidal Ideation with any Methods (Not Plan) Without Intent to Act (Recent) - No   RETIRE: 4. Active Suicidal Ideation with Some Intent to Act, Without Specific Plan (Lifetime) - No   4. Active Suicidal Ideation with Some Intent to Act, Without Specific Plan (Recent) - No   RETIRE: 5. Active Suicidal Ideation with Specific Plan and Intent (Lifetime) - Yes   RETIRE: Active Suicidal Ideation with Specific Plan and Intent Description (Lifetime) - Attempt 7/29/20   RETIRED: 5. Active Suicidal Ideation with Specific Plan and Intent (Recent) - Yes   RETIRED: Active Suicidal Ideation with Specific Plan and Intent Description (Recent) - Attempt 7/29/20   Most Severe Ideation Rating (Past Month) - 3   Most Severe Ideation Description (Past Month) - Overdosed on alcohol, SSRIs and a few opioids 7/29/20   Frequency (Past Month) - 4   Duration (Past Month) - 4   Controllability (Past Month) - 5   Protective Factors (Past Month) - 3   Reasons for Ideation (Past Month) - 3   Actual Attempt (Lifetime) - Yes   Actual Attempt Description (Lifetime) - 7/29/20 after relapsing  "on alcohol   Total Number of Actual Attempts (Lifetime) - 3   Comments - 2012, 2014 & 2020 all when drinking   Actual Attempt (Past 3 Months) - Yes   Actual Attempt Description (Past 3 Months) - 7/29/20 after relapsing on alcohol   Total Number of Actual Attempts (Past 3 Months) - 1   Has subject engaged in non-suicidal self-injurious behavior? (Lifetime) - Yes   Comments - Cutting as a teen   Has subject engaged in non-suicidal self-injurious behavior? (Past 3 Months) - No   Interrupted Attempts (Lifetime) - No   Interrupted Attempts (Past 3 Months) - No   Aborted or Self-Interrupted Attempt (Lifetime) - No   Aborted or Self-Interrupted Attempt (Past 3 Months) - No   Preparatory Acts or Behavior (Lifetime) - No   Preparatory Acts or Behavior (Past 3 Months) - No   Most Recent Attempt Date - 65589   Most Recent Attempt Actual Lethality Code - 3   Most Lethal Attempt Actual Lethality Code - 3   Initial/First Attempt Date - 65589   Initial/First Attempt Actual Lethality Code - 3         ASSESSMENT: Current Emotional / Mental Status (status of significant symptoms):   Risk status (Self / Other harm or suicidal ideation)   Patient denies current fears or concerns for personal safety.   Patient reports the following current or recent suicidal ideation or behaviors: passive suicidal ideation is intermittent during a typical week, without intention, plan, or attempt..   Patient reports current or recent homicidal ideation or behaviors including She has fantasies of her brother-in-law's girlfriend having disappointment or \"bad things happening\" but no urge, plan, intent, action towards harm of her nor homicidal ideation.   Patient denies current or recent self injurious behavior or ideation.   Patient denies other safety concerns.   Patient reports there has been no change in risk factors since their last session.     Patient reports there has been no change in protective factors since their last session.     Recommended " that patient call 911 or go to the local ED should there be a change in any of these risk factors.     A safety and risk management plan has been developed including: Patient has no change in safety concerns. Committed to safety and agreed to follow previously developed safety plan. Patient consented to co-developed safety plan.  Safety and risk management plan was completed.  Patient agreed to use safety plan should any safety concerns arise.  A copy was given to the patient.  Patient consented to co-developed safety plan on 8/25/2022.  Safety and risk management plan was reviewed.   Patient agreed to use safety plan should any safety concerns arise.  A copy was made available to the patient.       Appearance:   Appropriate    Eye Contact:   Good    Psychomotor Behavior: Slowed    Attitude:   Cooperative  Guarded     Orientation:   Person Place Time Situation   Speech    Rate / Production: Emotional    Volume:  Normal    Mood:    Anxious  Depressed  Sad  Apathetic   Affect:    Tearful Worrisome    Thought Content:  Magical Ideations  Perseverative   Thought Form:  Obsessive    Insight:    Good      Medication Review:   No changes to current psychiatric medication(s)     Medication Compliance:   Yes     Changes in Health Issues:   None reported     Chemical Use Review:   Substance Use: Problem use continues with no change since last session, Stage of Change: Action        Tobacco Use: No change in amount of tobacco use since last session.  Action    Diagnosis:  1. Major depressive disorder, recurrent episode, moderate (H)    2. HONEY (generalized anxiety disorder)    3. Alcohol use disorder, moderate, in early remission (H)     Rule out for Obsessive Compulsive disorder (excoriation and symmetry and intrusive thoughts).    Collateral Reports Completed:   Not Applicable    PLAN: (Patient Tasks / Therapist Tasks / Other)  Client will discuss her feelings and needs with her partner including her need for feeling defended  or protected from the emotional threat she feels from the affair partner.  She agreed to also address tension reduction strategies, emotional coaching and logic replacements of distortion cognitions.  Client will maintain sobriety from substances and will also initiate attendance in online AA groups or local AA meetings when possible.  Therapist will continue addressing emotion regulation strategies.          Cornelius Mckeon, LMFT                                                           ______________________________________________________________________    Individual Treatment Plan    Patient's Name: Kesha David  YOB: 1970    Date of Creation: 9/8/2022  Date Treatment Plan Last Reviewed/Revised: 3/1/2023  DSM5 Diagnoses:  1. Major depressive disorder, recurrent episode, moderate (H)    2. HONEY (generalized anxiety disorder)    3. Alcohol use disorder, moderate, in early remission (H)         Psychosocial / Contextual Factors: Past hospitalizations for substance misuse and mental health crises, current conflict with marital partner, support system conflicts  PROMIS (reviewed every 90 days): See above    Referral / Collaboration:  Referral to another professional/service is not indicated at this time..    Anticipated number of session for this episode of care: 9-12 sessions  Anticipation frequency of session: Weekly  Anticipated Duration of each session: 38-52 minutes  Treatment plan will be reviewed in 90 days or when goals have been changed.       MeasurableTreatment Goal(s) related to diagnosis / functional impairment(s)  Goal 1: Patient will maintain sobriety, decrease depressive symptoms, and increase personal confidence and stress/emotion regulation techniques.    I will know I've met my goal when not reported.      Objective #A (Patient Action)    Patient will attend AA at least 1 times in the next week  use at least 5 coping skills for anxiety management in the next 10 weeks  Increase  "interest, engagement, and pleasure in doing things  Decrease frequency and intensity of feeling down, depressed, hopeless  Improve quantity and quality of night time sleep / decrease daytime naps  Feel less tired and more energy during the day   Improve diet, appetite, mindful eating, and / or meal planning  Identify negative self-talk and behaviors: challenge core beliefs, myths, and actions  Improve concentration, focus, and mindfulness in daily activities   Feel less fidgety, restless or slow in daily activities / interpersonal interactions  Decrease thoughts that you'd be better off dead or of suicide / self-harm  Increase feelings of connection with spouse Rubio.  Status: Continued - Date(s): 3/1/2023: Fred completed alcohol inpatitent treatment and follow up sober living. She has built insight into her depressive and anxous symptoms, and has been sober from alcohol. She continues to focus on her 's invalidation, which leads to greater sense of depressed mood and frustration. She has been responsive to therapeutic suggestion and is showing willingness to include Rubio in sessions possibly in the future. She is aware of her intrusive thoughts and is starting to address her needs more assertively (as opposed to \"numbing out\" with alcohol).   Intervention(s)  Therapist will teach emotional regulation skills. Cognitive behavior therapy, emotion regulation strategies, and assertive communication will all be utilized to support treatment plan goals..    Patient has reviewed and agreed to the above plan.      JESSICA Rojas  3/01/23      Safety Plan: To be completed upon next session as client did not complete fully.  Adult Short Safety Plan:   Name: Kesha David  YOB: 1970  Date: August 25, 2022   My primary care provider: Tanvir Peterson  My primary care clinic: [facility]  My prescriber: Dr. Tanvir Benavidez  Other care team support: JESSICA Schwartz   My Triggers:  " Relationship conflict With extended family including brother-in-law's partner, Home environment Disruption due to, Medical Health Escalating rumination and Substance Use Relapse if occurring     Additional People, Places, and Things that I can access for support: Spouse, friends, clinical provider         What is important to me and makes life worth living: To be determined.         GREEN    Good Control  1. I feel good  2. No suicidal thoughts   3. Can work, sleep and play      Action Steps  1. Self-care: balanced meals, exercising, sleep practices, etc.  2. Take your medications as prescribed.  3. Continue meetings with therapist and prescriber.  4.  Do the healthy things that I enjoy.  5.  Stay active           YELLOW  Getting Worse  I have ANY of these:  1. I do not feel good  2. Difficulty Concentrating  3. Sleep is changing  4. Increase/Change in my thoughts to hurt self and/or others, but I can still manage and not act on it.   5. Not taking care of self.  6.  Feeling triggered             Action Steps (in addition to the above):  1. Inform your therapist and psychiatric prescriber/PCP.  2. Keep taking your medications as prescribed.    3. Turn to people you can ask for help.  4. Use internal coping strategies -see below.  5. Create safe environment: store firearms for safety, lock and limit medications and notify friends/family of increase in symptoms  6.  Communicate with supportive natural supports in your environment.           RED  Get Help  If I have ANY of these:  1. Current and uncontrollable thoughts and/or behaviors to hurt self and/or others.   2.  Current and/or uncontrollable thoughts to use substances   Actions to manage my safety  1. Contact your emergency person partner/spouse  2. Call or Text 535  3. Call my crisis team- [facility] [phone] [facility]  3. Or Call 911 or go to the emergency room right away  4.  Call crisis hotline        My Internal Coping  Strategies include the following:  take a bath, belly breathing, arts and crafts, color, fidget toys, use my coping box, exercise and use my coping skills    [End for Brief Safety Plan]     Safety Concerns  How To Identify Situations That Make Your Mental Health Worse:  Triggers are things that make your mental health worse.  Look at the list below to help you find your triggers and what you can do about them.     1. Identify Early Warning Signs:    Sometimes symptoms return, even when people do their best to stay well. Symptoms can develop over a short period of time with little or no warning, but most of the time they emerge gradually over several weeks.  Early warning signs are changes that people experience when a relapse is starting. Some early warning signs are common and others are not as common.   Common Early Warning Signs:    Feeling tense or nervous, Trouble sleeping -either too much or too little sleep, Feeling depressed or low, Feeling irritable, Trouble concentrating, Urges to harm self, Urges to harm others and Urges to use drugs or alcohol     2. Identify action steps to take when warning signs are noticed:    Taking Action- It is important to take action if you are experiencing early warning signs of a relapse.  The faster you act, the more likely it is that you can avoid a full relapse.  It is helpful to identify several specific ways to cope with symptoms.      The following is my list of symptoms and coping strategies that I can use when they are present:    Symptom Coping Strategies   Anxiety -Talk with someone in your support system and let him or her know how you are feeling.  -Use relaxation techniques such as deep breathing or imagery.  -Use positive affirmations to counteract negative self-talk such as  I am learning to let go of worry.    Depression - Schedule your day; include activities you have to do and activities you enjoy doing.  - Get some exercise - walk, run, bike, or swim.  -  Give yourself credit for even the smallest things you get done.   Sleep Difficulties   - Go to sleep at the same time every day.  - Do something relaxing before bed, such as drinking herbal tea or listening to music.  - Avoid having discussions about upsetting topics before going to bed.   Delusions   - Distract yourself from the disturbing thought by doing something that requires your attention such as a puzzle.  - Check out your beliefs by talking to someone you trust.    Hallucinations   - Use headphones to listen to music.  - Tell voices to  stop  or say to yourself,  I am safe.   - Ignore the hallucinations as much as possible; focus on other things.   Concentration Difficulties - Minimize distractions so there is only one thing for you to focus on at a time.    - Ask the person you are having a conversation with to slow down or repeat things you are unsure of.        Cornelius Mckeon [facility]  August 25, 2022          Answers for HPI/ROS submitted by the patient on 9/26/2022  If you checked off any problems, how difficult have these problems made it for you to do your work, take care of things at home, or get along with other people?: Somewhat difficult  PHQ9 TOTAL SCORE: 6    Answers for HPI/ROS submitted by the patient on 10/10/2022  If you checked off any problems, how difficult have these problems made it for you to do your work, take care of things at home, or get along with other people?: Very difficult  PHQ9 TOTAL SCORE: 19  HONEY 7 TOTAL SCORE: 15    Answers for HPI/ROS submitted by the patient on 2/20/2023  If you checked off any problems, how difficult have these problems made it for you to do your work, take care of things at home, or get along with other people?: Not difficult at all  PHQ9 TOTAL SCORE: 5  HONEY 7 TOTAL SCORE: 7

## 2023-03-15 ENCOUNTER — VIRTUAL VISIT (OUTPATIENT)
Dept: PSYCHOLOGY | Facility: CLINIC | Age: 53
End: 2023-03-15
Payer: COMMERCIAL

## 2023-03-15 DIAGNOSIS — F10.21 ALCOHOL USE DISORDER, MODERATE, IN EARLY REMISSION (H): ICD-10-CM

## 2023-03-15 DIAGNOSIS — F33.1 MAJOR DEPRESSIVE DISORDER, RECURRENT EPISODE, MODERATE (H): Primary | ICD-10-CM

## 2023-03-15 DIAGNOSIS — F41.1 GAD (GENERALIZED ANXIETY DISORDER): ICD-10-CM

## 2023-03-15 PROCEDURE — 90837 PSYTX W PT 60 MINUTES: CPT | Mod: VID | Performed by: MARRIAGE & FAMILY THERAPIST

## 2023-03-15 NOTE — PROGRESS NOTES
[facility]     Progress Note    Patient  Name:  Kesha David Date: 3/15/23      Service Type: Individual     Visit Start Time:      Visit End Time:     Visit #: 9    Attendees: Client and Spouse / Significant Other Rubio     Service Modality:  Video Visit:      Provider verified identity through the following two step process.  Patient provided:  Patient , Patient address and Patient was verified at admission/transfer    Telemedicine Visit: The patient's condition can be safely assessed and treated via synchronous audio and visual telemedicine encounter.      Reason for Telemedicine Visit: Patient has requested telehealth visit    Originating Site (Patient Location): Patient's home    Distant Site (Provider Location): [facility]    Consent:  The patient/guardian has verbally consented to: the potential risks and benefits of telemedicine (video visit) versus in person care; bill my insurance or make self-payment for services provided; and responsibility for payment of non-covered services.     Patient would like the video invitation sent by:  My Chart    Mode of Communication:  Video Conference via Amwell    As the provider I attest to compliance with applicable laws and regulations related to telemedicine.     DATA:   Extended Session (53+ minutes): PROLONGED SERVICE IN THE OUTPATIENT SETTING REQUIRING DIRECT (FACE-TO-FACE) PATIENT CONTACT BEYOND THE USUAL SERVICE:    - Treatment protocol required additional time to complete, due to the nature of diagnosis being treated.  See Interventions section for details   Interactive Complexity: No   Crisis: No     Presenting Concerns/  Current Stressors:    Progress Since Last Session (Related to Symptoms / Goals / Homework):   Symptoms: No change Depressive mood continues to respond to psychotropic medicatiosn. She stopped drinkng alcohol in 2022 and completed inpatient and aftercare programs, and  "returns to deal with these issues and marital concerns. Anxiety symptoms are daily, and she often finds herself frustrated and avoiding expression of anger.     Homework: Completed in session      Episode of Care Goals: Minimal progress - ACTION (Actively working towards change); Intervened by reinforcing change plan / affirming steps taken      Current / Ongoing Stressors and Concerns:  Client presented as a 53-year-old   cisgender heterosexual female from [place], [place] addressing major depressive disorder recurrent moderate, generalized anxiety disorder, and alcohol use disorder, in early remission (October 2022). 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 Client completed substance abuse counseling at [facility] in [place], [place] (November-February).     The client and her partner Rubio both presented to session to address the client's depressive anxious and alcohol sobriety challenges.   The client reported having positive interactions with Rubio last week, as they were able to enjoy a meal together out in the community.  The discussion was positive, and she felt appreciative of this.  Later, Rubio stated he wishes to address  patient's tendency of irritation and \"resentment\" from the past that influences her interactions with him.  Therapist and client discussed these issues as Rubio listed off different attributes.  The patient indicated she has numerous negative experiences, but frequently is not intending to communicate with negative tone or facial expressions.  Rubio continued to discuss his flashbacks and reexperiencing/hypervigilance of the patient engaging in substance dependent behavior from the past, and has frequent worries of this occurring again in the present.  Therapist then patient discussed openly ways that she is improving her sobriety plan, as well as how she is regulating her emotions.  She " demonstrated positive insight, and was sensitive to her partner's emotions.  Later in the session, the patient indicated she is going to see her father and mother down in [place] with her  coming with her.  Will be willing the first meeting she has had in multiple years, as the negativity between them has been detrimental to the patient's emotional wellbeing in the past.  She has cut him off emotionally, but will give him an opportunity to reconnect.  If it goes well, she hopes to enjoy a the rest of her travels with her , and she was asked to consider ways to make the experience positive for her and have strategies planned for positive and negative outcomes if possible.  This will be discussed upon next session.         Treatment Objective(s) Addressed in This Session:   attend AA at least 1 times in the next week  use at least 5 coping skills for anxiety management in the next 10 weeks  Increase interest, engagement, and pleasure in doing things  Decrease frequency and intensity of feeling down, depressed, hopeless  Improve quantity and quality of night time sleep / decrease daytime naps  Feel less tired and more energy during the day   Improve diet, appetite, mindful eating, and / or meal planning  Identify negative self-talk and behaviors: challenge core beliefs, myths, and actions  Improve concentration, focus, and mindfulness in daily activities   Feel less fidgety, restless or slow in daily activities / interpersonal interactions  Decrease thoughts that you'd be better off dead or of suicide / self-harm        Intervention:   Motivational Interviewing  MI Intervention: Expressed Empathy/Understanding, Supported Autonomy, Collaboration, Evocation, Permission to raise concern or advise, Open-ended questions, Reflections: simple and complex, Change talk (evoked) and Reframe   Change Talk Expressed by the Patient: Desire to change Ability to change Reasons to change Need to change Committment to  change Activation Taking steps  Provider Response to Change Talk: E - Evoked more info from patient about behavior change, A - Affirmed patient's thoughts, decisions, or attempts at behavior change, R - Reflected patient's change talk and S - Summarized patient's change talk statements     Assessments completed prior to visit:  The following assessments were completed by patient for this visit:  PHQ9:   PHQ-9 SCORE 2/9/2021 5/14/2021 8/25/2022 9/26/2022 10/10/2022 11/14/2022 2/20/2023   PHQ-9 Total Score - - - - - - -   PHQ-9 Total Score MyChart - - 12 (Moderate depression) 6 (Mild depression) 19 (Moderately severe depression) 2 (Minimal depression) 5 (Mild depression)   PHQ-9 Total Score 10 5 12 6 19 2 5     GAD2:   HONEY-2 9/8/2022 10/10/2022 10/10/2022 2/20/2023   Feeling nervous, anxious, or on edge 1 2 2 2   Not being able to stop or control worrying 1 3 3 1   HONEY-2 Total Score 2 5 5 3     CAGE-AID:   CAGE-AID Total Score 8/6/2020 8/18/2020   Total Score 3 4     PROMIS 10-Global Health (only subscores and total score):   PROMIS-10 Scores Only 9/8/2022 2/20/2023   Global Mental Health Score 10 13   Global Physical Health Score 14 16   PROMIS TOTAL - SUBSCORES 24 29     [place] Suicide Severity Rating Scale (Lifetime/Recent)  [place] Suicide Severity Rating (Lifetime/Recent) 8/6/2020 8/18/2020   Wish to be Dead (Lifetime) - Yes   Comments - after relapse/divorce threat in late July 2020   Non-Specific Active Suicidal Thoughts (Lifetime) - Yes   Non-Specific Active Suicidal Thought Description (Lifetime) - Hopelessness/depression - passive   Most Severe Ideation Rating (Lifetime) - 3   Most Severe Ideation Description (Lifetime) - Overdosed on alcohol, SSRIs and a few opioids 7/29/20   Frequency (Lifetime) - 4   Duration (Lifetime) - 4   Controllability (Lifetime) - 5   Protective Factors  (Lifetime) - 3   Reasons for Ideation (Lifetime) - 3   Q1 Wished to be Dead (Past Month) yes -   Q2 Suicidal Thoughts (Past  Month) yes -   Q3 Suicidal Thought Method yes -   Q4 Suicidal Intent without Specific Plan no -   Q5 Suicide Intent with Specific Plan yes -   Q6 Suicide Behavior (Lifetime) yes -   RETIRED: 1. Wish to be Dead (Recent) - Yes   RETIRED: Wish to be Dead Description (Recent) - after relapse/divorce threat in late July 2020   RETIRED: 2. Non-Specific Active Suicidal Thoughts (Recent) - Yes   Non-Specific Active Suicidal Thought Description (Recent) - Recent suicide attempt on 7/29/20   3. Active Suicidal Ideation with any Methods (Not Plan) Without Intent to Act (Lifetime) - No   RETIRED: 3. Active Suicidal Ideation with any Methods (Not Plan) Without Intent to Act (Recent) - No   RETIRE: 4. Active Suicidal Ideation with Some Intent to Act, Without Specific Plan (Lifetime) - No   4. Active Suicidal Ideation with Some Intent to Act, Without Specific Plan (Recent) - No   RETIRE: 5. Active Suicidal Ideation with Specific Plan and Intent (Lifetime) - Yes   RETIRE: Active Suicidal Ideation with Specific Plan and Intent Description (Lifetime) - Attempt 7/29/20   RETIRED: 5. Active Suicidal Ideation with Specific Plan and Intent (Recent) - Yes   RETIRED: Active Suicidal Ideation with Specific Plan and Intent Description (Recent) - Attempt 7/29/20   Most Severe Ideation Rating (Past Month) - 3   Most Severe Ideation Description (Past Month) - Overdosed on alcohol, SSRIs and a few opioids 7/29/20   Frequency (Past Month) - 4   Duration (Past Month) - 4   Controllability (Past Month) - 5   Protective Factors (Past Month) - 3   Reasons for Ideation (Past Month) - 3   Actual Attempt (Lifetime) - Yes   Actual Attempt Description (Lifetime) - 7/29/20 after relapsing on alcohol   Total Number of Actual Attempts (Lifetime) - 3   Comments - 2012, 2014 & 2020 all when drinking   Actual Attempt (Past 3 Months) - Yes   Actual Attempt Description (Past 3 Months) - 7/29/20 after relapsing on alcohol   Total Number of Actual Attempts (Past 3  "Months) - 1   Has subject engaged in non-suicidal self-injurious behavior? (Lifetime) - Yes   Comments - Cutting as a teen   Has subject engaged in non-suicidal self-injurious behavior? (Past 3 Months) - No   Interrupted Attempts (Lifetime) - No   Interrupted Attempts (Past 3 Months) - No   Aborted or Self-Interrupted Attempt (Lifetime) - No   Aborted or Self-Interrupted Attempt (Past 3 Months) - No   Preparatory Acts or Behavior (Lifetime) - No   Preparatory Acts or Behavior (Past 3 Months) - No   Most Recent Attempt Date - 65589   Most Recent Attempt Actual Lethality Code - 3   Most Lethal Attempt Actual Lethality Code - 3   Initial/First Attempt Date - 65589   Initial/First Attempt Actual Lethality Code - 3         ASSESSMENT: Current Emotional / Mental Status (status of significant symptoms):   Risk status (Self / Other harm or suicidal ideation)   Patient denies current fears or concerns for personal safety.   Patient reports the following current or recent suicidal ideation or behaviors: passive suicidal ideation is intermittent during a typical week, without intention, plan, or attempt..   Patient reports current or recent homicidal ideation or behaviors including She has fantasies of her brother-in-law's girlfriend having disappointment or \"bad things happening\" but no urge, plan, intent, action towards harm of her nor homicidal ideation.   Patient denies current or recent self injurious behavior or ideation.   Patient denies other safety concerns.   Patient reports there has been no change in risk factors since their last session.     Patient reports there has been no change in protective factors since their last session.     Recommended that patient call 911 or go to the local ED should there be a change in any of these risk factors.     A safety and risk management plan has been developed including: Patient has no change in safety concerns. Committed to safety and agreed to follow previously developed " safety plan. Patient consented to co-developed safety plan.  Safety and risk management plan was completed.  Patient agreed to use safety plan should any safety concerns arise.  A copy was given to the patient.  Patient consented to co-developed safety plan on 8/25/2022.  Safety and risk management plan was reviewed.   Patient agreed to use safety plan should any safety concerns arise.  A copy was made available to the patient.       Appearance:   Appropriate    Eye Contact:   Good    Psychomotor Behavior: Slowed    Attitude:   Cooperative  Guarded     Orientation:   Person Place Time Situation   Speech    Rate / Production: Normal/ Responsive    Volume:  Normal    Mood:    Anxious  Sad    Affect:    Worrisome    Thought Content:  Clear  Perseverative   Thought Form:  Coherent  Goal Directed  Logical    Insight:    Good      Medication Review:   No changes to current psychiatric medication(s)     Medication Compliance:   Yes     Changes in Health Issues:   None reported     Chemical Use Review:   Substance Use: Problem use continues with no change since last session, Stage of Change: Action        Tobacco Use: No change in amount of tobacco use since last session.  Action    Diagnosis:  1. Major depressive disorder, recurrent episode, moderate (H)    2. HONEY (generalized anxiety disorder)    3. Alcohol use disorder, moderate, in early remission (H)     Rule out for Obsessive Compulsive disorder (excoriation and symmetry and intrusive thoughts).    Collateral Reports Completed:   Not Applicable    PLAN: (Patient Tasks / Therapist Tasks / Other)  Client will discuss her feelings and needs with her partner including her need for feeling defended or protected from the emotional threat she feels from the affair partner.  She agreed to also address tension reduction strategies, emotional coaching and logic replacements of distortion cognitions.  Client will maintain sobriety from substances and will also initiate  attendance in online AA groups or local AA meetings when possible.  Therapist will continue addressing emotion regulation strategies.          Cornelius Mckeon, LMFT                                                           ______________________________________________________________________    Individual Treatment Plan    Patient's Name: Kesha David  YOB: 1970    Date of Creation: 9/8/2022  Date Treatment Plan Last Reviewed/Revised: 3/1/2023  DSM5 Diagnoses:  1. Major depressive disorder, recurrent episode, moderate (H)    2. HONEY (generalized anxiety disorder)    3. Alcohol use disorder, moderate, in early remission (H)         Psychosocial / Contextual Factors: Past hospitalizations for substance misuse and mental health crises, current conflict with marital partner, support system conflicts  PROMIS (reviewed every 90 days): See above    Referral / Collaboration:  Referral to another professional/service is not indicated at this time..    Anticipated number of session for this episode of care: 9-12 sessions  Anticipation frequency of session: Weekly  Anticipated Duration of each session: 38-52 minutes  Treatment plan will be reviewed in 90 days or when goals have been changed.       MeasurableTreatment Goal(s) related to diagnosis / functional impairment(s)  Goal 1: Patient will maintain sobriety, decrease depressive symptoms, and increase personal confidence and stress/emotion regulation techniques.    I will know I've met my goal when not reported.      Objective #A (Patient Action)    Patient will attend AA at least 1 times in the next week  use at least 5 coping skills for anxiety management in the next 10 weeks  Increase interest, engagement, and pleasure in doing things  Decrease frequency and intensity of feeling down, depressed, hopeless  Improve quantity and quality of night time sleep / decrease daytime naps  Feel less tired and more energy during the day   Improve diet, appetite,  "mindful eating, and / or meal planning  Identify negative self-talk and behaviors: challenge core beliefs, myths, and actions  Improve concentration, focus, and mindfulness in daily activities   Feel less fidgety, restless or slow in daily activities / interpersonal interactions  Decrease thoughts that you'd be better off dead or of suicide / self-harm  Increase feelings of connection with spouse Rubio.  Status: Continued - Date(s): 3/1/2023: Fred completed alcohol inpatitent treatment and follow up sober living. She has built insight into her depressive and anxous symptoms, and has been sober from alcohol. She continues to focus on her 's invalidation, which leads to greater sense of depressed mood and frustration. She has been responsive to therapeutic suggestion and is showing willingness to include Rubio in sessions possibly in the future. She is aware of her intrusive thoughts and is starting to address her needs more assertively (as opposed to \"numbing out\" with alcohol).   Intervention(s)  Therapist will teach emotional regulation skills. Cognitive behavior therapy, emotion regulation strategies, and assertive communication will all be utilized to support treatment plan goals..    Patient has reviewed and agreed to the above plan.      JESSICA Rojas  3/01/23      Safety Plan: To be completed upon next session as client did not complete fully.  Adult Short Safety Plan:   Name: Kesha David  YOB: 1970  Date: August 25, 2022   My primary care provider: Tanvir Peterson  My primary care clinic: [facility]  My prescriber: Dr. Tanvir Benavidez  Other care team support: JESSICA Schwartz   My Triggers:  Relationship conflict With extended family including brother-in-law's partner, Home environment Disruption due to, Medical Health Escalating rumination and Substance Use Relapse if occurring     Additional People, Places, and Things that I can access for support: Spouse, " friends, clinical provider         What is important to me and makes life worth living: To be determined.         GREEN    Good Control  1. I feel good  2. No suicidal thoughts   3. Can work, sleep and play      Action Steps  1. Self-care: balanced meals, exercising, sleep practices, etc.  2. Take your medications as prescribed.  3. Continue meetings with therapist and prescriber.  4.  Do the healthy things that I enjoy.  5.  Stay active           YELLOW  Getting Worse  I have ANY of these:  1. I do not feel good  2. Difficulty Concentrating  3. Sleep is changing  4. Increase/Change in my thoughts to hurt self and/or others, but I can still manage and not act on it.   5. Not taking care of self.  6.  Feeling triggered             Action Steps (in addition to the above):  1. Inform your therapist and psychiatric prescriber/PCP.  2. Keep taking your medications as prescribed.    3. Turn to people you can ask for help.  4. Use internal coping strategies -see below.  5. Create safe environment: store firearms for safety, lock and limit medications and notify friends/family of increase in symptoms  6.  Communicate with supportive natural supports in your environment.           RED  Get Help  If I have ANY of these:  1. Current and uncontrollable thoughts and/or behaviors to hurt self and/or others.   2.  Current and/or uncontrollable thoughts to use substances   Actions to manage my safety  1. Contact your emergency person partner/spouse  2. Call or Text [phone]  3. Call my crisis team- [facility] [phone] [facility]  3. Or Call 911 or go to the emergency room right away  4.  Call crisis hotline        My Internal Coping Strategies include the following:  take a bath, belly breathing, arts and crafts, color, fidget toys, use my coping box, exercise and use my coping skills    [End for Brief Safety Plan]     Safety Concerns  How To Identify Situations That Make Your Mental Health Worse:   Triggers are things that make your mental health worse.  Look at the list below to help you find your triggers and what you can do about them.     1. Identify Early Warning Signs:    Sometimes symptoms return, even when people do their best to stay well. Symptoms can develop over a short period of time with little or no warning, but most of the time they emerge gradually over several weeks.  Early warning signs are changes that people experience when a relapse is starting. Some early warning signs are common and others are not as common.   Common Early Warning Signs:    Feeling tense or nervous, Trouble sleeping -either too much or too little sleep, Feeling depressed or low, Feeling irritable, Trouble concentrating, Urges to harm self, Urges to harm others and Urges to use drugs or alcohol     2. Identify action steps to take when warning signs are noticed:    Taking Action- It is important to take action if you are experiencing early warning signs of a relapse.  The faster you act, the more likely it is that you can avoid a full relapse.  It is helpful to identify several specific ways to cope with symptoms.      The following is my list of symptoms and coping strategies that I can use when they are present:    Symptom Coping Strategies   Anxiety -Talk with someone in your support system and let him or her know how you are feeling.  -Use relaxation techniques such as deep breathing or imagery.  -Use positive affirmations to counteract negative self-talk such as  I am learning to let go of worry.    Depression - Schedule your day; include activities you have to do and activities you enjoy doing.  - Get some exercise - walk, run, bike, or swim.  - Give yourself credit for even the smallest things you get done.   Sleep Difficulties   - Go to sleep at the same time every day.  - Do something relaxing before bed, such as drinking herbal tea or listening to music.  - Avoid having discussions about upsetting topics before  going to bed.   Delusions   - Distract yourself from the disturbing thought by doing something that requires your attention such as a puzzle.  - Check out your beliefs by talking to someone you trust.    Hallucinations   - Use headphones to listen to music.  - Tell voices to  stop  or say to yourself,  I am safe.   - Ignore the hallucinations as much as possible; focus on other things.   Concentration Difficulties - Minimize distractions so there is only one thing for you to focus on at a time.    - Ask the person you are having a conversation with to slow down or repeat things you are unsure of.        Cornelius Mckeon, [facility]  August 25, 2022          Answers for HPI/ROS submitted by the patient on 9/26/2022  If you checked off any problems, how difficult have these problems made it for you to do your work, take care of things at home, or get along with other people?: Somewhat difficult  PHQ9 TOTAL SCORE: 6    Answers for HPI/ROS submitted by the patient on 10/10/2022  If you checked off any problems, how difficult have these problems made it for you to do your work, take care of things at home, or get along with other people?: Very difficult  PHQ9 TOTAL SCORE: 19  HONEY 7 TOTAL SCORE: 15    Answers for HPI/ROS submitted by the patient on 2/20/2023  If you checked off any problems, how difficult have these problems made it for you to do your work, take care of things at home, or get along with other people?: Not difficult at all  PHQ9 TOTAL SCORE: 5  HONEY 7 TOTAL SCORE: 7

## 2023-03-22 ENCOUNTER — VIRTUAL VISIT (OUTPATIENT)
Dept: PSYCHIATRY | Facility: CLINIC | Age: 53
End: 2023-03-22
Attending: PHYSICIAN ASSISTANT
Payer: COMMERCIAL

## 2023-03-22 DIAGNOSIS — J45.30 MILD PERSISTENT ASTHMA WITHOUT COMPLICATION: ICD-10-CM

## 2023-03-22 DIAGNOSIS — F33.0 MAJOR DEPRESSIVE DISORDER, RECURRENT EPISODE, MILD (H): ICD-10-CM

## 2023-03-22 DIAGNOSIS — F90.0 ATTENTION DEFICIT HYPERACTIVITY DISORDER (ADHD), PREDOMINANTLY INATTENTIVE TYPE: ICD-10-CM

## 2023-03-22 DIAGNOSIS — F10.21 ALCOHOL USE DISORDER, MODERATE, IN EARLY REMISSION (H): ICD-10-CM

## 2023-03-22 DIAGNOSIS — F33.1 MAJOR DEPRESSIVE DISORDER, RECURRENT EPISODE, MODERATE (H): ICD-10-CM

## 2023-03-22 DIAGNOSIS — R56.9 SEIZURE (H): ICD-10-CM

## 2023-03-22 DIAGNOSIS — F41.1 ANXIETY STATE: ICD-10-CM

## 2023-03-22 PROCEDURE — 99204 OFFICE O/P NEW MOD 45 MIN: CPT | Mod: VID | Performed by: PSYCHIATRY & NEUROLOGY

## 2023-03-22 RX ORDER — ATOMOXETINE 100 MG/1
100 CAPSULE ORAL DAILY
Qty: 30 CAPSULE | Refills: 1 | Status: SHIPPED | OUTPATIENT
Start: 2023-03-22 | End: 2023-05-01

## 2023-03-22 RX ORDER — GUANFACINE 1 MG/1
2 TABLET ORAL AT BEDTIME
Qty: 60 TABLET | Refills: 1 | Status: SHIPPED | OUTPATIENT
Start: 2023-03-22 | End: 2023-05-01

## 2023-03-22 ASSESSMENT — ANXIETY QUESTIONNAIRES
GAD7 TOTAL SCORE: 10
7. FEELING AFRAID AS IF SOMETHING AWFUL MIGHT HAPPEN: SEVERAL DAYS
1. FEELING NERVOUS, ANXIOUS, OR ON EDGE: MORE THAN HALF THE DAYS
IF YOU CHECKED OFF ANY PROBLEMS ON THIS QUESTIONNAIRE, HOW DIFFICULT HAVE THESE PROBLEMS MADE IT FOR YOU TO DO YOUR WORK, TAKE CARE OF THINGS AT HOME, OR GET ALONG WITH OTHER PEOPLE: SOMEWHAT DIFFICULT
8. IF YOU CHECKED OFF ANY PROBLEMS, HOW DIFFICULT HAVE THESE MADE IT FOR YOU TO DO YOUR WORK, TAKE CARE OF THINGS AT HOME, OR GET ALONG WITH OTHER PEOPLE?: SOMEWHAT DIFFICULT
5. BEING SO RESTLESS THAT IT IS HARD TO SIT STILL: SEVERAL DAYS
3. WORRYING TOO MUCH ABOUT DIFFERENT THINGS: SEVERAL DAYS
4. TROUBLE RELAXING: SEVERAL DAYS
6. BECOMING EASILY ANNOYED OR IRRITABLE: MORE THAN HALF THE DAYS
GAD7 TOTAL SCORE: 10
2. NOT BEING ABLE TO STOP OR CONTROL WORRYING: MORE THAN HALF THE DAYS
7. FEELING AFRAID AS IF SOMETHING AWFUL MIGHT HAPPEN: SEVERAL DAYS
GAD7 TOTAL SCORE: 10

## 2023-03-22 ASSESSMENT — PATIENT HEALTH QUESTIONNAIRE - PHQ9
SUM OF ALL RESPONSES TO PHQ QUESTIONS 1-9: 7
10. IF YOU CHECKED OFF ANY PROBLEMS, HOW DIFFICULT HAVE THESE PROBLEMS MADE IT FOR YOU TO DO YOUR WORK, TAKE CARE OF THINGS AT HOME, OR GET ALONG WITH OTHER PEOPLE: VERY DIFFICULT
SUM OF ALL RESPONSES TO PHQ QUESTIONS 1-9: 7

## 2023-03-22 NOTE — NURSING NOTE
Is the patient currently in the state of [place]? YES    Visit mode:VIDEO    If the visit is dropped, the patient can be reconnected by: VIDEO VISIT: Text to cell phone:   Telephone Information:   Mobile [phone]       Will anyone else be joining the visit? No  (If patient encounters technical issues they should call [phone])    How would you like to obtain your AVS? MyChart    Are changes needed to the allergy or medication list? NO    Rooming Documentation: Patient will complete qnrs in mychart    Reason for visit:  Follow Up    SIN Bee

## 2023-03-22 NOTE — Clinical Note
Tanvir,  Thank you for your consult and care of the patient.  Medications at least most of them seem to having at least partial efficacy.  Titrating atomoxetine a little further, then plan to titrate Intuniv to assess and with more time.  Sincerely, Grayson Uriarte M.D. Consultative Psychiatrist Program Medical Director, [facility]
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
## 2023-04-12 ENCOUNTER — VIRTUAL VISIT (OUTPATIENT)
Dept: PSYCHOLOGY | Facility: CLINIC | Age: 53
End: 2023-04-12
Payer: COMMERCIAL

## 2023-04-12 DIAGNOSIS — Z53.9 NO SHOW: Primary | ICD-10-CM

## 2023-04-12 NOTE — PROGRESS NOTES
Patient no-showed today's appointment; appointment was for 1:30 PM appointment.     Contacted Kesha David regarding today's appointment.  No answer. Message was left to call provider or reschedule with scheduling dept.

## 2023-04-19 ENCOUNTER — TELEPHONE (OUTPATIENT)
Dept: SURGERY | Facility: CLINIC | Age: 53
End: 2023-04-19
Payer: COMMERCIAL

## 2023-04-19 NOTE — TELEPHONE ENCOUNTER
Left voicemail for patient to switch from Dr. Linares on 4/25 to Ann on 4/25.    Iraida salazar Clinic Visit Facilitator- Surgical Specialties

## 2023-04-25 ENCOUNTER — OFFICE VISIT (OUTPATIENT)
Dept: SURGERY | Facility: CLINIC | Age: 53
End: 2023-04-25
Attending: PHYSICIAN ASSISTANT
Payer: COMMERCIAL

## 2023-04-25 VITALS — WEIGHT: 189.7 LBS | BODY MASS INDEX: 30.49 KG/M2 | HEIGHT: 66 IN

## 2023-04-25 DIAGNOSIS — A42.89 CUTANEOUS ACTINOMYCOTIC INFECTION: ICD-10-CM

## 2023-04-25 DIAGNOSIS — Z98.84 S/P GASTRIC BYPASS: ICD-10-CM

## 2023-04-25 DIAGNOSIS — E65 ABDOMINAL PANNUS: ICD-10-CM

## 2023-04-25 DIAGNOSIS — L73.2 HIDRADENITIS SUPPURATIVA: ICD-10-CM

## 2023-04-25 PROCEDURE — 99203 OFFICE O/P NEW LOW 30 MIN: CPT | Performed by: PHYSICIAN ASSISTANT
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
## 2023-04-25 NOTE — NURSING NOTE
"Kesha David's goals for this visit include:   Chief Complaint   Patient presents with     Consult     Excess skin, abdomin and thighs, hx of HS       She requests these members of her care team be copied on today's visit information: no    PCP: Tanvir Peterson    Referring Provider:  Tanvir Peterson PA-C  [address] [place] [id number]    Ht 1.67 m (5' 5.75\")   Wt 86 kg (189 lb 11.2 oz)   LMP 12/01/2016 (Approximate)   BMI 30.85 kg/m      Do you need any medication refills at today's visit? No    Dasia Lorenz LPN      "
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
## 2023-05-01 DIAGNOSIS — L73.2 HIDRADENITIS SUPPURATIVA: Primary | ICD-10-CM

## 2023-05-01 RX ORDER — DOXYCYCLINE 100 MG/1
100 CAPSULE ORAL 2 TIMES DAILY
Qty: 20 CAPSULE | Refills: 0 | Status: SHIPPED | OUTPATIENT
Start: 2023-05-01 | End: 2023-12-27

## 2023-05-09 ENCOUNTER — TELEPHONE (OUTPATIENT)
Dept: SURGERY | Facility: CLINIC | Age: 53
End: 2023-05-09

## 2023-05-09 NOTE — TELEPHONE ENCOUNTER
Patient called in and is wondering on the PA and if anything has been approved yet for surgery. Will route to clinic as writer is unsure if PA has been submitted or started. Patient noted in voicemail Ann Longoria, PA was going to initiate the PA.

## 2023-05-09 NOTE — TELEPHONE ENCOUNTER
Reviewed chart.    Staff message sent by MILIND Garcia to initiate prior authorization.    Pt needs quotes drafted and sent.  Annika Guzman RN

## 2023-05-23 ENCOUNTER — TELEPHONE (OUTPATIENT)
Dept: PLASTIC SURGERY | Facility: CLINIC | Age: 53
End: 2023-05-23
Payer: COMMERCIAL

## 2023-05-23 ENCOUNTER — MEDICAL CORRESPONDENCE (OUTPATIENT)
Dept: HEALTH INFORMATION MANAGEMENT | Facility: CLINIC | Age: 53
End: 2023-05-23
Payer: COMMERCIAL

## 2023-05-23 ENCOUNTER — TRANSFERRED RECORDS (OUTPATIENT)
Dept: HEALTH INFORMATION MANAGEMENT | Facility: CLINIC | Age: 53
End: 2023-05-23
Payer: COMMERCIAL

## 2023-05-23 NOTE — TELEPHONE ENCOUNTER
[facility] Call Center    Phone Message    May a detailed message be left on voicemail: yes     Reason for Call: Other: Patient attempted to schedule abdinoplasty and needs assistance     Action Taken: Message routed to:  [facility] plas surg recon    Travel Screening: Not Applicable

## 2023-05-23 NOTE — TELEPHONE ENCOUNTER
----- Message from Amy Jasmine RN sent at 5/19/2023  9:45 AM CDT -----  Regarding: FW: PA    ----- Message -----  From: Ann Longoria PA-C  Sent: 5/9/2023  12:14 PM CDT  To: [facility] [place]  Subject: MILIND Maldonado,     Please start prior auth for panniculectomy with completion abdominoplasty.    Please give patient quotes for panniculectomy  Abdominoplasty  Completion abdominoplasty   Medial thigh lift      Thank you! :)       Patient

## 2023-05-24 ENCOUNTER — TELEPHONE (OUTPATIENT)
Dept: SURGERY | Facility: CLINIC | Age: 53
End: 2023-05-24
Payer: COMMERCIAL

## 2023-05-24 NOTE — TELEPHONE ENCOUNTER
PB DOS: TBD *pending cpt codes  Type of Procedure: panniculectomy with completion abdominoplasty  CPT Codes:   ICD10 Codes:  Surgeon/Ordering provider: Milagros  Pre-cert/Authorization completed:    Payer: [employer]  Spoke to   Ref. #/ Auth #   Valid Dates:     This is not a guarantee of payment. Payment is subject to the patient's plan benefits, medical necessity and eligibility at the time services are rendered.

## 2023-05-24 NOTE — TELEPHONE ENCOUNTER
Received voicemail from patient on 5/24 regarding scheduling surgery with Dr. Linares.    Patient would like to schedule consult or next steps. Will route to [facility] team as patient was seen there.     No orders for surgery entered yet.  __    Nell Lyn, Senior Perioperative Coordinator, on 5/24/2023  P: [phone]

## 2023-05-24 NOTE — TELEPHONE ENCOUNTER
Writer called and spoke with patient and informed her we are submitting for a PA with the insurance company. Per FC note dated 5/23/23, codes are pending for PA. Patient informed contact will clinic her with approval or denial once we hear back. Patient verbalized understanding.    Will to  to inquire if any updates.    Dasia Lorenz LPN

## 2023-05-24 NOTE — TELEPHONE ENCOUNTER
Contacted pt to schedule a consult for General surgery for hemorrhoids that she was seen in the urgent care for on 5/23/23. Request was faxed to us from urgent care in [place]

## 2023-05-26 NOTE — TELEPHONE ENCOUNTER
PB DOS: TBD  Type of Procedure: panniculectomy with completion abdominoplasty  CPT Codes: 11891-  Excision, excessive skin and subcutaneous tissue (includes lipectomy); abdomen, infraumbilical panniculectomy   89679-  Excision, excessive skin and subcutaneous tissue (includes lipectomy), abdomen (eg, abdominoplasty) (includes umbilical transposition and fascial plication) (List separately in addition to code for primary procedure)     ICD10 Codes: E65, A42.89, L73.2, Z98.84  Surgeon/Ordering provider: Daria [id number]  Pre-cert/Authorization completed:  pending review  Payer: [employer]  [facility] to [employer] portal  Ref. #[id number], Tracking ID # [id number]/ Auth #   Valid Dates:      This is not a guarantee of payment. Payment is subject to the patient's plan benefits, medical necessity and eligibility at the time services are rendered.

## 2023-05-30 NOTE — TELEPHONE ENCOUNTER
PB DOS: TBD  Type of Procedure: panniculectomy with completion abdominoplasty  CPT Codes: 45677-  Excision, excessive skin and subcutaneous tissue (includes lipectomy); abdomen, infraumbilical panniculectomy   42287-  Excision, excessive skin and subcutaneous tissue (includes lipectomy), abdomen (eg, abdominoplasty) (includes umbilical transposition and fascial plication) (List separately in addition to code for primary procedure)     ICD10 Codes: E65, A42.89, L73.2, Z98.84  Surgeon/Ordering provider: Daria [id number]  Pre-cert/Authorization completed:  approved via fax   Payer: [employer]  Spoke to [employer] portal  Auth # [id number]  Valid Dates: 6/1/23-7/30/23     This is not a guarantee of payment. Payment is subject to the patient's plan benefits, medical necessity and eligibility at the time services are rendered.

## 2023-06-08 DIAGNOSIS — L98.7 EXCESS SKIN OF ABDOMEN: Primary | ICD-10-CM

## 2023-06-08 RX ORDER — CEFAZOLIN SODIUM 2 G/50ML
2 SOLUTION INTRAVENOUS SEE ADMIN INSTRUCTIONS
Status: CANCELLED | OUTPATIENT
Start: 2023-06-08

## 2023-06-08 RX ORDER — CEFAZOLIN SODIUM 2 G/50ML
2 SOLUTION INTRAVENOUS
Status: CANCELLED | OUTPATIENT
Start: 2023-06-08

## 2023-06-08 RX ORDER — ENOXAPARIN SODIUM 100 MG/ML
40 INJECTION SUBCUTANEOUS
Status: CANCELLED | OUTPATIENT
Start: 2023-06-08

## 2023-06-09 NOTE — TELEPHONE ENCOUNTER
Date Scheduled: 9-22-23  Facility: Surgery Locations: [facility]  Surgeon: Dr. Linares   Post-op appointment scheduled:    scheduled?: No  Surgery packet/instructions confirmed received?  Yes  Pre op physical/PAC appointment: Pt to schedule in [place]  Special Considerations:     Pre surgery clinic consult 9/15/23 1:00pm    Joana Biswas  Surgery Scheduling Coordinator  [phone]

## 2023-06-14 NOTE — TELEPHONE ENCOUNTER
Hello,      PB DOS: 9/22/2023*Spoke with Angi from [employer] and she has changed the auth dates to 8/15/2023- 10/13/2023 as patient is scheduled 9/22/2023.  New authorization letter will be faxed.    Type of Procedure: panniculectomy with completion abdominoplasty  CPT Codes: 41360- Excision, excessive skin and subcutaneous tissue (includes lipectomy); abdomen, infraumbilical panniculectomy   66319- Excision, excessive skin and subcutaneous tissue (includes lipectomy), abdomen (eg, abdominoplasty) (includes umbilical transposition and fascial plication) (List separately in addition to code for primary procedure)     ICD10 Codes: E65, A42.89, L73.2, Z98.84  Surgeon/Ordering provider: Daria [id number]  Pre-cert/Authorization completed: approved via fax   Payer: [employer]  Spoke to [employer] portal  Auth # [id number]  Valid Dates: 8/15/2023-10/13/2023     This is not a guarantee of payment. Payment is subject to the patient's plan benefits, medical necessity and eligibility at the time services are rendered.     Thanks!    Esperanza Deshpande    Financial Counselor  [address] [place], [place] [address] [place], [place] [address]  Phone- [phone]  Fax- [phone]

## 2023-06-20 NOTE — TELEPHONE ENCOUNTER
PB DOS: 9/22/2023     Type of Procedure: panniculectomy with completion abdominoplasty  CPT Codes: 09565- Excision, excessive skin and subcutaneous tissue (includes lipectomy); abdomen, infraumbilical panniculectomy   45278- Excision, excessive skin and subcutaneous tissue (includes lipectomy), abdomen (eg, abdominoplasty) (includes umbilical transposition and fascial plication) (List separately in addition to code for primary procedure)      ICD10 Codes: E65, A42.89, L73.2, Z98.84  Surgeon/Ordering provider: Daria [id number]  Pre-cert/Authorization completed: approved via fax   Payer: [employer]  Spoke to [employer] portal  Auth # [id number]  Valid Dates: 8/15/2023-10/13/2023     This is not a guarantee of payment. Payment is subject to the patient's plan benefits, medical necessity and eligibility at the time services are rendered.

## 2023-06-26 DIAGNOSIS — L73.2 HIDRADENITIS SUPPURATIVA: ICD-10-CM

## 2023-06-26 DIAGNOSIS — A42.89 CUTANEOUS ACTINOMYCOTIC INFECTION: ICD-10-CM

## 2023-06-26 DIAGNOSIS — Z98.84 S/P GASTRIC BYPASS: ICD-10-CM

## 2023-06-26 DIAGNOSIS — E65 ABDOMINAL PANNUS: ICD-10-CM

## 2023-06-26 RX ORDER — PHENTERMINE HYDROCHLORIDE 37.5 MG/1
TABLET ORAL
Qty: 30 TABLET | Refills: 0 | Status: SHIPPED | OUTPATIENT
Start: 2023-06-26 | End: 2023-10-09

## 2023-09-11 ASSESSMENT — PATIENT HEALTH QUESTIONNAIRE - PHQ9
10. IF YOU CHECKED OFF ANY PROBLEMS, HOW DIFFICULT HAVE THESE PROBLEMS MADE IT FOR YOU TO DO YOUR WORK, TAKE CARE OF THINGS AT HOME, OR GET ALONG WITH OTHER PEOPLE: SOMEWHAT DIFFICULT
SUM OF ALL RESPONSES TO PHQ QUESTIONS 1-9: 2
SUM OF ALL RESPONSES TO PHQ QUESTIONS 1-9: 2

## 2023-09-11 ASSESSMENT — ASTHMA QUESTIONNAIRES: ACT_TOTALSCORE: 25

## 2023-09-12 ENCOUNTER — TELEPHONE (OUTPATIENT)
Dept: FAMILY MEDICINE | Facility: CLINIC | Age: 53
End: 2023-09-12

## 2023-09-12 ENCOUNTER — OFFICE VISIT (OUTPATIENT)
Dept: FAMILY MEDICINE | Facility: CLINIC | Age: 53
End: 2023-09-12
Payer: COMMERCIAL

## 2023-09-12 VITALS
WEIGHT: 181 LBS | HEIGHT: 66 IN | BODY MASS INDEX: 29.09 KG/M2 | DIASTOLIC BLOOD PRESSURE: 84 MMHG | OXYGEN SATURATION: 100 % | SYSTOLIC BLOOD PRESSURE: 110 MMHG | HEART RATE: 80 BPM | TEMPERATURE: 97.3 F | RESPIRATION RATE: 18 BRPM

## 2023-09-12 DIAGNOSIS — Z98.84 S/P GASTRIC BYPASS: ICD-10-CM

## 2023-09-12 DIAGNOSIS — F10.21 ALCOHOL USE DISORDER, MODERATE, IN EARLY REMISSION (H): ICD-10-CM

## 2023-09-12 DIAGNOSIS — Z01.818 PREOP GENERAL PHYSICAL EXAM: Primary | ICD-10-CM

## 2023-09-12 DIAGNOSIS — R56.9 SEIZURE (H): ICD-10-CM

## 2023-09-12 DIAGNOSIS — E65 ABDOMINAL PANNUS: ICD-10-CM

## 2023-09-12 DIAGNOSIS — F33.0 MAJOR DEPRESSIVE DISORDER, RECURRENT EPISODE, MILD (H): ICD-10-CM

## 2023-09-12 DIAGNOSIS — J45.30 MILD PERSISTENT ASTHMA WITHOUT COMPLICATION: ICD-10-CM

## 2023-09-12 DIAGNOSIS — I10 HYPERTENSION GOAL BP (BLOOD PRESSURE) < 140/90: ICD-10-CM

## 2023-09-12 DIAGNOSIS — K21.00 GASTROESOPHAGEAL REFLUX DISEASE WITH ESOPHAGITIS WITHOUT HEMORRHAGE: ICD-10-CM

## 2023-09-12 LAB
ANION GAP SERPL CALCULATED.3IONS-SCNC: 12 MMOL/L (ref 7–15)
BASOPHILS # BLD AUTO: 0.1 10E3/UL (ref 0–0.2)
BASOPHILS NFR BLD AUTO: 1 %
BUN SERPL-MCNC: 13.4 MG/DL (ref 6–20)
CALCIUM SERPL-MCNC: 9.2 MG/DL (ref 8.6–10)
CHLORIDE SERPL-SCNC: 105 MMOL/L (ref 98–107)
CREAT SERPL-MCNC: 0.77 MG/DL (ref 0.51–0.95)
DEPRECATED HCO3 PLAS-SCNC: 24 MMOL/L (ref 22–29)
EGFRCR SERPLBLD CKD-EPI 2021: >90 ML/MIN/1.73M2
EOSINOPHIL # BLD AUTO: 0.3 10E3/UL (ref 0–0.7)
EOSINOPHIL NFR BLD AUTO: 4 %
ERYTHROCYTE [DISTWIDTH] IN BLOOD BY AUTOMATED COUNT: 12.5 % (ref 10–15)
GLUCOSE SERPL-MCNC: 88 MG/DL (ref 70–99)
HCT VFR BLD AUTO: 40.4 % (ref 35–47)
HGB BLD-MCNC: 13.9 G/DL (ref 11.7–15.7)
IMM GRANULOCYTES # BLD: 0 10E3/UL
IMM GRANULOCYTES NFR BLD: 0 %
LYMPHOCYTES # BLD AUTO: 2.3 10E3/UL (ref 0.8–5.3)
LYMPHOCYTES NFR BLD AUTO: 34 %
MCH RBC QN AUTO: 31 PG (ref 26.5–33)
MCHC RBC AUTO-ENTMCNC: 34.4 G/DL (ref 31.5–36.5)
MCV RBC AUTO: 90 FL (ref 78–100)
MONOCYTES # BLD AUTO: 0.5 10E3/UL (ref 0–1.3)
MONOCYTES NFR BLD AUTO: 7 %
NEUTROPHILS # BLD AUTO: 3.7 10E3/UL (ref 1.6–8.3)
NEUTROPHILS NFR BLD AUTO: 54 %
NRBC # BLD AUTO: 0 10E3/UL
NRBC BLD AUTO-RTO: 0 /100
PLATELET # BLD AUTO: 233 10E3/UL (ref 150–450)
POTASSIUM SERPL-SCNC: 4.7 MMOL/L (ref 3.4–5.3)
RBC # BLD AUTO: 4.49 10E6/UL (ref 3.8–5.2)
SODIUM SERPL-SCNC: 141 MMOL/L (ref 136–145)
WBC # BLD AUTO: 6.8 10E3/UL (ref 4–11)

## 2023-09-12 PROCEDURE — 99214 OFFICE O/P EST MOD 30 MIN: CPT | Performed by: NURSE PRACTITIONER

## 2023-09-12 PROCEDURE — 80048 BASIC METABOLIC PNL TOTAL CA: CPT | Performed by: NURSE PRACTITIONER

## 2023-09-12 PROCEDURE — 36415 COLL VENOUS BLD VENIPUNCTURE: CPT | Performed by: NURSE PRACTITIONER

## 2023-09-12 PROCEDURE — 85025 COMPLETE CBC W/AUTO DIFF WBC: CPT | Performed by: NURSE PRACTITIONER

## 2023-09-12 ASSESSMENT — PAIN SCALES - GENERAL: PAINLEVEL: NO PAIN (0)

## 2023-09-12 NOTE — TELEPHONE ENCOUNTER
Patient Returning Call    Reason for call:  patient called and is wondering if provider Rosi Kyle would be a prescriber for centermine medication she met with Kalpana Hung on 9/12    Information relayed to patient:  n    Patient has additional questions:  No      Could we send this information to you in [place] or would you prefer to receive a phone call?:   Patient would prefer a phone call   Okay to leave a detailed message?: Yes at Home number on file [phone] (home)
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
## 2023-09-12 NOTE — PATIENT INSTRUCTIONS
For informational purposes only. Not to replace the advice of your health care provider. Copyright   2003,  [employer] [facility]. All rights reserved. Clinically reviewed by Cathy Maynard MD. [place] [id number] - REV .  Preparing for Your Surgery  Getting started  A nurse will call you to review your health history and instructions. They will give you an arrival time based on your scheduled surgery time. Please be ready to share:  Your doctor's clinic name and phone number  Your medical, surgical, and anesthesia history  A list of allergies and sensitivities  A list of medicines, including herbal treatments and over-the-counter drugs  Whether the patient has a legal guardian (ask how to send us the papers in advance)  Please tell us if you're pregnant--or if there's any chance you might be pregnant. Some surgeries may injure a fetus (unborn baby), so they require a pregnancy test. Surgeries that are safe for a fetus don't always need a test, and you can choose whether to have one.   If you have a child who's having surgery, please ask for a copy of Preparing for Your Child's Surgery.    Preparing for surgery  Within 10 to 30 days of surgery: Have a pre-op exam (sometimes called an H&P, or History and Physical). This can be done at a clinic or pre-operative center.  If you're having a , you may not need this exam. Talk to your care team.  At your pre-op exam, talk to your care team about all medicines you take. If you need to stop any medicines before surgery, ask when to start taking them again.  We do this for your safety. Many medicines can make you bleed too much during surgery. Some change how well surgery (anesthesia) drugs work.  Call your insurance company to let them know you're having surgery. (If you don't have insurance, call [phone].)  Call your clinic if there's any change in your health. This includes signs of a cold or flu (sore throat, runny nose, cough, rash, fever). It also  includes a scrape or scratch near the surgery site.  If you have questions on the day of surgery, call your hospital or surgery center.  Eating and drinking guidelines  For your safety: Unless your surgeon tells you otherwise, follow the guidelines below.  Eat and drink as usual until 8 hours before you arrive for surgery. After that, no food or milk.  Drink clear liquids until 2 hours before you arrive. These are liquids you can see through, like water, Gatorade, and Propel Water. They also include plain black coffee and tea (no cream or milk), candy, and breath mints. You can spit out gum when you arrive.  If you drink alcohol: Stop drinking it the night before surgery.  If your care team tells you to take medicine on the morning of surgery, it's okay to take it with a sip of water.  Preventing infection  Shower or bathe the night before and morning of your surgery. Follow the instructions your clinic gave you. (If no instructions, use regular soap.)  Don't shave or clip hair near your surgery site. We'll remove the hair if needed.  Don't smoke or vape the morning of surgery. You may chew nicotine gum up to 2 hours before surgery. A nicotine patch is okay.  Note: Some surgeries require you to completely quit smoking and nicotine. Check with your surgeon.  Your care team will make every effort to keep you safe from infection. We will:  Clean our hands often with soap and water (or an alcohol-based hand rub).  Clean the skin at your surgery site with a special soap that kills germs.  Give you a special gown to keep you warm. (Cold raises the risk of infection.)  Wear special hair covers, masks, gowns and gloves during surgery.  Give antibiotic medicine, if prescribed. Not all surgeries need antibiotics.  What to bring on the day of surgery  Photo ID and insurance card  Copy of your health care directive, if you have one  Glasses and hearing aids (bring cases)  You can't wear contacts during surgery  Inhaler and eye  drops, if you use them (tell us about these when you arrive)  CPAP machine or breathing device, if you use them  A few personal items, if spending the night  If you have . . .  A pacemaker, ICD (cardiac defibrillator) or other implant: Bring the ID card.  An implanted stimulator: Bring the remote control.  A legal guardian: Bring a copy of the certified (court-stamped) guardianship papers.  Please remove any jewelry, including body piercings. Leave jewelry and other valuables at home.  If you're going home the day of surgery  You must have a responsible adult drive you home. They should stay with you overnight as well.  If you don't have someone to stay with you, and you aren't safe to go home alone, we may keep you overnight. Insurance often won't pay for this.  After surgery  If it's hard to control your pain or you need more pain medicine, please call your surgeon's office.  Questions?   If you have any questions for your care team, list them here: _________________________________________________________________________________________________________________________________________________________________________ ____________________________________ ____________________________________ ____________________________________    How to Take Your Medication Before Surgery  - Take all of your medications before surgery as usual

## 2023-09-13 NOTE — TELEPHONE ENCOUNTER
Patient notified that she would need to set up an appointment with Rosi to further discuss starting of phentermine medication for weight loss. Appointment has been set up on 10/9 with a 10:20 am arrival. Sandra Coburn LPN

## 2023-09-13 NOTE — TELEPHONE ENCOUNTER
Patient can schedule an appointment with Rosi Soriano PA-C to discuss weight loss medications.  She cannot make a determination without meeting patient and discussing weight loss goals and reviewing medical history to determine if this is the correct medication for her or not.    Will have staff notify patient and schedule appointment for her if she wants.    Clint De La Paz MD

## 2023-09-15 ENCOUNTER — OFFICE VISIT (OUTPATIENT)
Dept: SURGERY | Facility: CLINIC | Age: 53
End: 2023-09-15
Payer: COMMERCIAL

## 2023-09-15 DIAGNOSIS — L98.7 EXCESS SKIN OF ABDOMEN: Primary | ICD-10-CM

## 2023-09-15 PROCEDURE — 99214 OFFICE O/P EST MOD 30 MIN: CPT | Performed by: PLASTIC SURGERY

## 2023-09-15 NOTE — NURSING NOTE
Kesha David's chief complaint for this visit includes:  Chief Complaint   Patient presents with    RECHECK     pre surgery consult abdominoplasty/panni 9/22     PCP: Tanvir Ovalle    Referring Provider:  No referring provider defined for this encounter.    LMP 12/01/2016 (Approximate)   Data Unavailable        Allergies   Allergen Reactions    Cats     Dogs     Grass     Morphine And Related      Tylenol #3-hives    Trees          Do you need any medication refills at today's visit? No    Iraida salazar Clinic Visit Facilitator- Surgical Specialties

## 2023-09-15 NOTE — PROGRESS NOTES
PREOPERATIVE VISIT NOTE     PRESENTING COMPLAINT:  Preop visit for upcoming abdominoplasty and panniculectomy covered by insurance scheduled for 9/22/2023.     HISTORY OF PRESENTING COMPLAINT: The patient is here for a pre-op visit for the patient's surgery.  The patient is being seen in the presence of my nurse. There is no change since the patient's consultation. Please see the consult note for details.     Patient was seen by my PA Ann cortez in April 2023.  Please see her note for details.  Patient was actively smoking at that time.  Her note states that she was counseled to quit smoking prior to surgery.  Over the ensuing months she got prior authorization for the above-named procedure.  She was then scheduled.  She is now seeing me 1 week prior to surgery and preop.  No change in history and physical exam.  Patient is smoking half a pack a day currently.     ASSESSMENT AND PLAN:  Based upon the above findings, the patient is here for a preop visit for upcoming surgery.     I had a denisa detailed discussion with the patient in the presence of my nurse and my resident Earnest.  I completely understood the frustration of the patient in the sense that it took a few months for her to get on the schedule and now is seeing me 1 week prior to the surgery with active smoking.  She was of the opinion that she needed to be off nicotine postoperatively only.  I counseled her that nicotine has serious deleterious effects on healing especially in surgeries like a tummy tuck.  I counseled her that the best and safest way to proceed would be to postpone surgery for at least 6 weeks as long as she quit smoking as of today completely.  I do not think it is wise to proceed with surgery in 1 week with her actively smoking as of today half a pack a day.  Had a long discussion about this in a sympathetic manner as possible.  Patient will think about it and let me know.  I have let my nurse manager know of this predicament and to  discuss with administration as well as legal as the patient wants me to proceed despite knowing the high risks for wound healing complications.  I am hesitant to do so and voiced this to the patient.    For now the patient will discuss this with her family think about what we discussed today and I will await the final advice from legal and my administrators next week.  From my standpoint the surgery should be postponed for at least 6 weeks.    In preparation for the future procedure:     I had a denisa, detailed discussion with the patient in the presence of my nurse (who was present from beginning to end) about the proposed surgery. I was very clear and detailed about overview of surgery, perioperative plans, and expectations. All risks, benefits and alternatives of the surgery including but not limited to pain, infection, bleeding, scarring, asymmetry, seromas, hematomas, wound breakdown, wound dehiscence, prominent scar, hypertrophic scar, keloid scar, requirement of further surgeries, skin/tissue necrosis, nerve/muscle/deeper structure injury, DVT, PE, MI, CVA, pneumonia, renal failure and death, were explained in detail. The patient agreed and understood them all and had all the questions answered to the patient's satisfaction, and the patient wants to proceed with surgery. I look forward to helping the patient out in the near future.  All questions were answered.  The patient was happy with the visit.    Total time spent in the encounter today including chart review, visit itself, and post-visit paperwork was 30 minutes.

## 2023-09-15 NOTE — LETTER
9/15/2023         RE: Kesha David  [address] [facility] [address]        Dear Colleague,    Thank you for referring your patient, Kesha David, to the [facility]. Please see a copy of my visit note below.    PREOPERATIVE VISIT NOTE     PRESENTING COMPLAINT:  Preop visit for upcoming abdominoplasty and panniculectomy covered by insurance scheduled for 9/22/2023.     HISTORY OF PRESENTING COMPLAINT: The patient is here for a pre-op visit for the patient's surgery.  The patient is being seen in the presence of my nurse. There is no change since the patient's consultation. Please see the consult note for details.     Patient was seen by my PA Ann back in April 2023.  Please see her note for details.  Patient was actively smoking at that time.  Her note states that she was counseled to quit smoking prior to surgery.  Over the ensuing months she got prior authorization for the above-named procedure.  She was then scheduled.  She is now seeing me 1 week prior to surgery and preop.  No change in history and physical exam.  Patient is smoking half a pack a day currently.     ASSESSMENT AND PLAN:  Based upon the above findings, the patient is here for a preop visit for upcoming surgery.     I had a denisa detailed discussion with the patient in the presence of my nurse and my resident Earnest.  I completely understood the frustration of the patient in the sense that it took a few months for her to get on the schedule and now is seeing me 1 week prior to the surgery with active smoking.  She was of the opinion that she needed to be off nicotine postoperatively only.  I counseled her that nicotine has serious deleterious effects on healing especially in surgeries like a tummy tuck.  I counseled her that the best and safest way to proceed would be to postpone surgery for at least 6 weeks as long as she quit smoking as of today completely.  I do not think it is wise to proceed with  surgery in 1 week with her actively smoking as of today half a pack a day.  Had a long discussion about this in a sympathetic manner as possible.  Patient will think about it and let me know.  I have let my nurse manager know of this predicament and to discuss with administration as well as legal as the patient wants me to proceed despite knowing the high risks for wound healing complications.  I am hesitant to do so and voiced this to the patient.    For now the patient will discuss this with her family think about what we discussed today and I will await the final advice from legal and my administrators next week.  From my standpoint the surgery should be postponed for at least 6 weeks.    In preparation for the future procedure:     I had a denisa, detailed discussion with the patient in the presence of my nurse (who was present from beginning to end) about the proposed surgery. I was very clear and detailed about overview of surgery, perioperative plans, and expectations. All risks, benefits and alternatives of the surgery including but not limited to pain, infection, bleeding, scarring, asymmetry, seromas, hematomas, wound breakdown, wound dehiscence, prominent scar, hypertrophic scar, keloid scar, requirement of further surgeries, skin/tissue necrosis, nerve/muscle/deeper structure injury, DVT, PE, MI, CVA, pneumonia, renal failure and death, were explained in detail. The patient agreed and understood them all and had all the questions answered to the patient's satisfaction, and the patient wants to proceed with surgery. I look forward to helping the patient out in the near future.  All questions were answered.  The patient was happy with the visit.    Total time spent in the encounter today including chart review, visit itself, and post-visit paperwork was 30 minutes.       Again, thank you for allowing me to participate in the care of your patient.        Sincerely,        TR Linares MD

## 2023-09-19 ENCOUNTER — TELEPHONE (OUTPATIENT)
Dept: PLASTIC SURGERY | Facility: CLINIC | Age: 53
End: 2023-09-19
Payer: COMMERCIAL

## 2023-09-19 NOTE — TELEPHONE ENCOUNTER
RN Care Coordinator: Amy Jasmine    Surgery is scheduled with Dr. Linares  Date: 1/26   Location: [facility]      H&P to be completed by: PAC clinic on 1/16     Surgical consult: Declined unless necessary     Post-op: 2/9 with Dr. Linares, [facility]      Patient will receive surgery arrival and start time from PAC. Approximate arrival time/surgery time given to patient: 6 AM. Patient aware times are subject to change up until day before surgery.         Spoke with the patient and was able to confirm all scheduled information.       Patient questions/concerns: N/A       Surgery packet: was sent via [employer]    __    Nell Lyn Senior Perioperative Coordinator, on 9/19/2023 at 9:37 AM  P: [phone]   needs device and assist

## 2023-09-20 NOTE — TELEPHONE ENCOUNTER
FUTURE VISIT INFORMATION      SURGERY INFORMATION:  Date: 24  Location:  OR  Surgeon:  TR Linares MD   Anesthesia Type:  General with block  Procedure: ABDOMINOPLASTY, WITH PANNICULECTOMY   Consult: ov 9/15/23    RECORDS REQUESTED FROM:       Primary Care Provider: [facility]    Pertinent Medical History: hypertension    Most recent EKG+ Tracin20- [facility]    Most recent Sleep Study:   19

## 2023-10-09 ENCOUNTER — OFFICE VISIT (OUTPATIENT)
Dept: FAMILY MEDICINE | Facility: CLINIC | Age: 53
End: 2023-10-09
Payer: COMMERCIAL

## 2023-10-09 VITALS
HEART RATE: 94 BPM | OXYGEN SATURATION: 98 % | DIASTOLIC BLOOD PRESSURE: 84 MMHG | BODY MASS INDEX: 29.18 KG/M2 | RESPIRATION RATE: 14 BRPM | WEIGHT: 180.8 LBS | SYSTOLIC BLOOD PRESSURE: 132 MMHG | TEMPERATURE: 98 F

## 2023-10-09 DIAGNOSIS — M25.552 HIP PAIN, LEFT: ICD-10-CM

## 2023-10-09 DIAGNOSIS — E65 ABDOMINAL PANNUS: ICD-10-CM

## 2023-10-09 DIAGNOSIS — Z98.84 S/P GASTRIC BYPASS: ICD-10-CM

## 2023-10-09 DIAGNOSIS — E66.3 OVERWEIGHT: Primary | ICD-10-CM

## 2023-10-09 DIAGNOSIS — L73.2 HIDRADENITIS SUPPURATIVA: ICD-10-CM

## 2023-10-09 DIAGNOSIS — F17.200 TOBACCO USE DISORDER: ICD-10-CM

## 2023-10-09 PROCEDURE — 99214 OFFICE O/P EST MOD 30 MIN: CPT | Performed by: PHYSICIAN ASSISTANT

## 2023-10-09 RX ORDER — PHENTERMINE HYDROCHLORIDE 37.5 MG/1
1 TABLET ORAL
Qty: 30 TABLET | Refills: 2 | Status: SHIPPED | OUTPATIENT
Start: 2023-10-09 | End: 2023-12-27

## 2023-10-09 RX ORDER — VARENICLINE TARTRATE 0.5 (11)-1
KIT ORAL
Qty: 53 TABLET | Refills: 0 | Status: SHIPPED | OUTPATIENT
Start: 2023-10-09 | End: 2024-03-11

## 2023-10-09 RX ORDER — VARENICLINE TARTRATE 1 MG/1
1 TABLET, FILM COATED ORAL 2 TIMES DAILY
Qty: 60 TABLET | Refills: 2 | Status: SHIPPED | OUTPATIENT
Start: 2023-10-09 | End: 2023-12-27

## 2023-10-09 RX ORDER — CYCLOBENZAPRINE HCL 10 MG
10 TABLET ORAL 3 TIMES DAILY PRN
Qty: 90 TABLET | Refills: 2 | Status: SHIPPED | OUTPATIENT
Start: 2023-10-09 | End: 2023-12-27

## 2023-10-09 NOTE — PROGRESS NOTES
Timi Rebolledo is a 53 year old, presenting for the following health issues:  Recheck Medication        10/9/2023    10:39 AM   Additional Questions   Roomed by Sandra pelaez       History of Present Illness       Reason for visit:  Med mgmt    She eats 2-3 servings of fruits and vegetables daily.She consumes 0 sweetened beverage(s) daily.She exercises with enough effort to increase her heart rate 10 to 19 minutes per day.  She exercises with enough effort to increase her heart rate 3 or less days per week.   She is taking medications regularly.     Patient presents today for follow-up regarding Phentermine. She recently restarted this. She is planning to have an abdominoplasty in January. She has history of gastric bypass with excess skin but would like to get weight down as much as possible before the surgery. She has been taking this without difficulty. She reports she generally eats well but does not enjoy exercise. She tries to get her exercise by working in the yard and keeping up with her 2 year old grandson.     She needs to quit smoking. They will not do the surgery unless she has. Has used Chantix in the past and tolerated this well.     She also notes having pain in her left hip and right shoulder. These feel more muscular and worsen during the day. Interferes with sleep at night.     Moods doing fine overall.     Review of Systems   Constitutional, HEENT, cardiovascular, pulmonary, GI, , musculoskeletal, neuro, skin, endocrine and psych systems are negative, except as otherwise noted.      Objective    /84   Pulse 94   Temp 98  F (36.7  C)   Resp 14   Wt 82 kg (180 lb 12.8 oz)   LMP 12/01/2016 (Approximate)   SpO2 98%   BMI 29.18 kg/m    Body mass index is 29.18 kg/m .  Physical Exam   GENERAL: healthy, alert and no distress  NECK: no adenopathy, no asymmetry, masses, or scars and thyroid normal to palpation  RESP: lungs clear to auscultation - no rales, rhonchi or wheezes  CV: regular  rate and rhythm, normal S1 S2, no S3 or S4, no murmur, click or rub, no peripheral edema and peripheral pulses strong  MS: no gross musculoskeletal defects noted, no edema  SKIN: no suspicious lesions or rashes  PSYCH: mentation appears normal, affect normal/bright        Assessment & Plan     Overweight  Has recently started Phentermine again as this has been successful for her in the past. She is wanting to get her weight down as much as she can prior to her surgery scheduled in January. Appropriate use and side effects again reviewed. Encouraged ongoing diet and exercise modifications.     Hidradenitis suppurativa  - phentermine (ADIPEX-P) 37.5 MG tablet; Take 1 tablet (37.5 mg) by mouth daily before breakfast    Abdominal pannus  - phentermine (ADIPEX-P) 37.5 MG tablet; Take 1 tablet (37.5 mg) by mouth daily before breakfast    S/P gastric bypass  - phentermine (ADIPEX-P) 37.5 MG tablet; Take 1 tablet (37.5 mg) by mouth daily before breakfast    Tobacco use disorder  Tips for successful smoking cessation discussed today. Chantix started as below. Side effects including close monitoring of mental health discussed.   - varenicline (CHANTIX TOÑA) 0.5 MG X 11 & 1 MG X 42 tablet; Take 0.5 mg tab daily for 3 days, THEN 0.5 mg tab twice daily for 4 days, THEN 1 mg twice daily.  - varenicline (CHANTIX) 1 MG tablet; Take 1 tablet (1 mg) by mouth 2 times daily    Hip pain, left  Will trial Flexeril. May need to consider PT and/or ortho consult if symptoms persisting. Continue supportive cares.   - cyclobenzaprine (FLEXERIL) 10 MG tablet; Take 1 tablet (10 mg) by mouth 3 times daily as needed for muscle spasms    The patient indicates understanding of these issues and agrees with the plan.    CHARLEY Gna [facility]

## 2023-10-16 ENCOUNTER — PATIENT OUTREACH (OUTPATIENT)
Dept: CARE COORDINATION | Facility: CLINIC | Age: 53
End: 2023-10-16
Payer: COMMERCIAL

## 2023-10-23 ENCOUNTER — PATIENT OUTREACH (OUTPATIENT)
Dept: CARE COORDINATION | Facility: CLINIC | Age: 53
End: 2023-10-23
Payer: COMMERCIAL

## 2023-10-30 ENCOUNTER — PATIENT OUTREACH (OUTPATIENT)
Dept: CARE COORDINATION | Facility: CLINIC | Age: 53
End: 2023-10-30
Payer: COMMERCIAL

## 2023-12-11 ENCOUNTER — TELEPHONE (OUTPATIENT)
Dept: SURGERY | Facility: CLINIC | Age: 53
End: 2023-12-11
Payer: COMMERCIAL

## 2023-12-11 NOTE — TELEPHONE ENCOUNTER
Pt called, no answer. VM Id's pt. Left detailed message with reason for call and  for pt to call the [facility] back at [phone]..Rafael message sent as well TR Cohn RN, MD Tschida, Kayla; Dasia Lorenz LPN; Amy Jasmine, RN; Lyudmila Guillory, RN  Agreed.    We need to call her 6 weeks pre-surgery date to make sure she id off ALL nicotine. We MUST let her know about that for sure.    Thanks    Kvng

## 2023-12-14 NOTE — TELEPHONE ENCOUNTER
Pt responded to RN's [place] message and [facility] message routed to  as an FYI. Closing this encounter.Amy Jasmine RN

## 2023-12-27 ENCOUNTER — MYC REFILL (OUTPATIENT)
Dept: FAMILY MEDICINE | Facility: CLINIC | Age: 53
End: 2023-12-27
Payer: COMMERCIAL

## 2023-12-27 ENCOUNTER — MYC REFILL (OUTPATIENT)
Dept: FAMILY MEDICINE | Facility: OTHER | Age: 53
End: 2023-12-27
Payer: COMMERCIAL

## 2023-12-27 DIAGNOSIS — L73.2 HIDRADENITIS SUPPURATIVA: ICD-10-CM

## 2023-12-27 DIAGNOSIS — Z98.84 S/P GASTRIC BYPASS: ICD-10-CM

## 2023-12-27 DIAGNOSIS — F17.200 TOBACCO USE DISORDER: ICD-10-CM

## 2023-12-27 DIAGNOSIS — M25.552 HIP PAIN, LEFT: ICD-10-CM

## 2023-12-27 DIAGNOSIS — F41.1 GAD (GENERALIZED ANXIETY DISORDER): ICD-10-CM

## 2023-12-27 DIAGNOSIS — Z87.898 HISTORY OF CHRONIC COUGH: ICD-10-CM

## 2023-12-27 DIAGNOSIS — F33.0 MAJOR DEPRESSIVE DISORDER, RECURRENT EPISODE, MILD (H): ICD-10-CM

## 2023-12-27 DIAGNOSIS — E65 ABDOMINAL PANNUS: ICD-10-CM

## 2023-12-27 DIAGNOSIS — J45.30 MILD PERSISTENT ASTHMA WITHOUT COMPLICATION: ICD-10-CM

## 2023-12-27 DIAGNOSIS — F10.10 ETOH ABUSE: ICD-10-CM

## 2023-12-27 DIAGNOSIS — F10.21 ALCOHOL USE DISORDER, MODERATE, IN EARLY REMISSION (H): ICD-10-CM

## 2023-12-27 RX ORDER — LAMOTRIGINE 25 MG/1
50 TABLET ORAL DAILY
Qty: 180 TABLET | Refills: 0 | Status: SHIPPED | OUTPATIENT
Start: 2023-12-27 | End: 2024-03-11

## 2023-12-27 RX ORDER — PHENTERMINE HYDROCHLORIDE 37.5 MG/1
1 TABLET ORAL
Qty: 30 TABLET | Refills: 2 | Status: SHIPPED | OUTPATIENT
Start: 2023-12-27 | End: 2024-03-11

## 2023-12-27 RX ORDER — DULOXETIN HYDROCHLORIDE 60 MG/1
60 CAPSULE, DELAYED RELEASE ORAL DAILY
Qty: 90 CAPSULE | Refills: 3 | Status: SHIPPED | OUTPATIENT
Start: 2023-12-27 | End: 2024-03-11

## 2023-12-27 RX ORDER — DOXYCYCLINE 100 MG/1
100 CAPSULE ORAL 2 TIMES DAILY
Qty: 20 CAPSULE | Refills: 0 | Status: SHIPPED | OUTPATIENT
Start: 2023-12-27 | End: 2024-02-06

## 2023-12-27 RX ORDER — NALTREXONE HYDROCHLORIDE 50 MG/1
50 TABLET, FILM COATED ORAL DAILY
Qty: 90 TABLET | Refills: 0 | Status: SHIPPED | OUTPATIENT
Start: 2023-12-27 | End: 2024-03-11

## 2023-12-27 RX ORDER — CYCLOBENZAPRINE HCL 10 MG
10 TABLET ORAL 3 TIMES DAILY PRN
Qty: 90 TABLET | Refills: 2 | Status: SHIPPED | OUTPATIENT
Start: 2023-12-27 | End: 2024-07-10

## 2023-12-27 RX ORDER — VARENICLINE TARTRATE 1 MG/1
1 TABLET, FILM COATED ORAL 2 TIMES DAILY
Qty: 60 TABLET | Refills: 2 | Status: SHIPPED | OUTPATIENT
Start: 2023-12-27 | End: 2024-04-01

## 2023-12-27 RX ORDER — ALBUTEROL SULFATE 0.83 MG/ML
2.5 SOLUTION RESPIRATORY (INHALATION) EVERY 6 HOURS PRN
Qty: 180 ML | Refills: 3 | Status: SHIPPED | OUTPATIENT
Start: 2023-12-27

## 2023-12-27 RX ORDER — ALBUTEROL SULFATE 90 UG/1
2 AEROSOL, METERED RESPIRATORY (INHALATION) EVERY 6 HOURS
Qty: 18 G | Refills: 0 | Status: SHIPPED | OUTPATIENT
Start: 2023-12-27 | End: 2024-03-11

## 2023-12-27 RX ORDER — FLUTICASONE FUROATE AND VILANTEROL 200; 25 UG/1; UG/1
1 POWDER RESPIRATORY (INHALATION) DAILY
Qty: 60 EACH | Refills: 0 | Status: SHIPPED | OUTPATIENT
Start: 2023-12-27 | End: 2024-03-01

## 2023-12-27 NOTE — TELEPHONE ENCOUNTER
varenicline (CHANTIX) 1 MG tablet [Rosi Soriano]      Patient Comment: Please send through saravanan scott who is now my primary care physician. I now use [facility] [place] for my pharmacy.     Routing refill request to provider for review/approval because:  Drug not on the [facility], [facility] or [facility] refill protocol or controlled substance           phentermine (ADIPEX-P) 37.5 MG tablet [Rosi Soriano]      Patient Comment: Please send through saravanan scott who is now my primary care physician. I now use [facility] [place] for my pharmacy.     Routing refill request to provider for review/approval because:  Drug not on the [facility], [facility] or [facility] refill protocol or controlled substance          cyclobenzaprine (FLEXERIL) 10 MG tablet [Rosi Soriano]      Patient Comment: Please send through saravanan scott who is now my primary care physician. I now use [facility] in [place] for my pharmacy.          Routing refill request to provider for review/approval because:  Drug not on the [facility], [facility] or [facility] refill protocol or controlled substance    Preferred pharmacy: [facility] [place], [address]

## 2024-01-02 ENCOUNTER — CARE COORDINATION (OUTPATIENT)
Dept: SURGERY | Facility: CLINIC | Age: 54
End: 2024-01-02

## 2024-01-06 ENCOUNTER — HEALTH MAINTENANCE LETTER (OUTPATIENT)
Age: 54
End: 2024-01-06

## 2024-01-16 ENCOUNTER — ANESTHESIA EVENT (OUTPATIENT)
Dept: SURGERY | Facility: AMBULATORY SURGERY CENTER | Age: 54
End: 2024-01-16
Payer: COMMERCIAL

## 2024-01-16 ENCOUNTER — VIRTUAL VISIT (OUTPATIENT)
Dept: SURGERY | Facility: CLINIC | Age: 54
End: 2024-01-16
Payer: COMMERCIAL

## 2024-01-16 ENCOUNTER — PRE VISIT (OUTPATIENT)
Dept: SURGERY | Facility: CLINIC | Age: 54
End: 2024-01-16

## 2024-01-16 DIAGNOSIS — L98.7 EXCESS SKIN OF ABDOMEN: ICD-10-CM

## 2024-01-16 DIAGNOSIS — Z01.818 PREOP EXAMINATION: Primary | ICD-10-CM

## 2024-01-16 PROCEDURE — 99202 OFFICE O/P NEW SF 15 MIN: CPT | Mod: 95 | Performed by: PHYSICIAN ASSISTANT

## 2024-01-16 ASSESSMENT — LIFESTYLE VARIABLES: TOBACCO_USE: 1

## 2024-01-16 ASSESSMENT — ENCOUNTER SYMPTOMS: SEIZURES: 1

## 2024-01-16 NOTE — PATIENT INSTRUCTIONS
Name:  Kesha David   MRN:  [id number]   :  1970   Today's Date:  2024         You were seen today for a pre-operative assessment in the:    Pre-operative Anesthesia Assessment Center(PAC)  [facility]  [address] [place] [id number]  phone [phone]      You will be receiving a call with location, date, arrival time and diet instructions from Preadmission Nursing at your surgical site:    -[facility]: [phone]       Anesthesia recommendations for medications:    Hold Aspirin for 7 days before procedure.  Hold Multivitamins for 7 days before procedure.   Hold Herbal medications and Supplements for 7 days before procedure.  Hold Ibuprofen for 1 day before procedure.   Hold Naproxen for 4 days before procedure.   Acetaminophen (Tylenol) is okay to take if needed.    No alcohol or cannabis products for 24 hours before your procedure     Hold Phentermine for 7 days prior to surgery. Take the last Phentermine on 24, and then hold until surgery.    Hold Naltrexone (Depade/Revia) for 3 days prior to surgery. Take the last Naltrexone 24, and then hold until surgery.      Please DO NOT take the following medications the day of procedure:  Cetirizine (Zyrtec)  Varenicline (Chantix)      Please take these medications the day of procedure:  Doxycycline Hyclate (Vibramycin)  Duloxetine (Cymbalta)  Fluticasone-Vilanterol (BREO Ellipta) inhaler  Lamotrigine (Lamictal)  Omeprazole (Prilosec)  Albuterol inhaler if needed  Albuterol neb if needed  Cyclobenzaprine (Flexeril) if needed  Fluticasone (Flonase) nasal spray if needed    Bring Albuterol inhaler.      How do I prepare myself?  - Please take 2 showers (one the night prior to surgery and one the morning of surgery) using Scrubcare or Hibiclens soap.    Use this soap only from the neck to your toes.     Leave the soap on your skin for one minute--then rinse thoroughly.      You may use your own shampoo  and conditioner. No other hair products.   - Please remove all jewelry and body piercings.  - No lotions, deodorants or fragrance.  - No makeup or fingernail polish.   - Bring your ID and insurance card.    -If you have a Deep Brain Stimulator, a Spinal Cord Stimulator, or any implanted Neuro Device, you must bring the remote to your appointment       For further questions regarding your surgery please call your surgeon's office.
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
## 2024-01-16 NOTE — PROGRESS NOTES
Kesha is a 53 year old who is being evaluated via a billable video visit.      How would you like to obtain your AVS? Gabriel Capellan   Kesha is a 53 year old, presenting for the following health issues:  Pre-Op Exam        ELEAZAR Anderson LPN

## 2024-01-16 NOTE — OR NURSING
Was asked by Lindsey CARDENAS to go over medications with Kesha David for the upcoming surgery at [facility] on 1-26-24. Verbally given medication instructions and instructions sent via My Chart. Kesha has no questions at this time.

## 2024-01-26 ENCOUNTER — ANESTHESIA (OUTPATIENT)
Dept: SURGERY | Facility: AMBULATORY SURGERY CENTER | Age: 54
End: 2024-01-26
Payer: COMMERCIAL

## 2024-01-26 ENCOUNTER — HOSPITAL ENCOUNTER (OUTPATIENT)
Facility: AMBULATORY SURGERY CENTER | Age: 54
Discharge: HOME | End: 2024-01-26
Attending: PLASTIC SURGERY | Admitting: PLASTIC SURGERY
Payer: COMMERCIAL

## 2024-01-26 VITALS
BODY MASS INDEX: 29.73 KG/M2 | WEIGHT: 185 LBS | HEIGHT: 66 IN | DIASTOLIC BLOOD PRESSURE: 78 MMHG | OXYGEN SATURATION: 98 % | RESPIRATION RATE: 15 BRPM | HEART RATE: 90 BPM | SYSTOLIC BLOOD PRESSURE: 142 MMHG | TEMPERATURE: 97.2 F

## 2024-01-26 DIAGNOSIS — L98.7 EXCESS SKIN OF ABDOMEN: ICD-10-CM

## 2024-01-26 PROCEDURE — 15847 EXC SKIN ABD ADD-ON: CPT | Mod: GC | Performed by: PLASTIC SURGERY

## 2024-01-26 PROCEDURE — 15830 EXC EXCESSIVE SKIN ABDOMEN: CPT

## 2024-01-26 PROCEDURE — 15847 EXC SKIN ABD ADD-ON: CPT

## 2024-01-26 PROCEDURE — G8907 PT DOC NO EVENTS ON DISCHARG: HCPCS

## 2024-01-26 PROCEDURE — G8916 PT W IV AB GIVEN ON TIME: HCPCS

## 2024-01-26 PROCEDURE — 15830 EXC EXCESSIVE SKIN ABDOMEN: CPT | Mod: GC | Performed by: PLASTIC SURGERY

## 2024-01-26 RX ORDER — AMOXICILLIN 250 MG
1-2 CAPSULE ORAL 2 TIMES DAILY
Qty: 30 TABLET | Refills: 0 | Status: SHIPPED | OUTPATIENT
Start: 2024-01-26

## 2024-01-26 RX ORDER — CEFAZOLIN SODIUM 2 G/50ML
2 SOLUTION INTRAVENOUS SEE ADMIN INSTRUCTIONS
Status: DISCONTINUED | OUTPATIENT
Start: 2024-01-26 | End: 2024-01-27 | Stop reason: HOSPADM

## 2024-01-26 RX ORDER — FENTANYL CITRATE 50 UG/ML
25-50 INJECTION, SOLUTION INTRAMUSCULAR; INTRAVENOUS
Status: DISCONTINUED | OUTPATIENT
Start: 2024-01-26 | End: 2024-01-27 | Stop reason: HOSPADM

## 2024-01-26 RX ORDER — DEXMEDETOMIDINE HYDROCHLORIDE 4 UG/ML
INJECTION, SOLUTION INTRAVENOUS PRN
Status: DISCONTINUED | OUTPATIENT
Start: 2024-01-26 | End: 2024-01-26

## 2024-01-26 RX ORDER — FENTANYL CITRATE 50 UG/ML
50 INJECTION, SOLUTION INTRAMUSCULAR; INTRAVENOUS EVERY 5 MIN PRN
Status: DISCONTINUED | OUTPATIENT
Start: 2024-01-26 | End: 2024-01-27 | Stop reason: HOSPADM

## 2024-01-26 RX ORDER — ENOXAPARIN SODIUM 100 MG/ML
40 INJECTION SUBCUTANEOUS
Status: DISCONTINUED | OUTPATIENT
Start: 2024-01-26 | End: 2024-01-26

## 2024-01-26 RX ORDER — ONDANSETRON 2 MG/ML
4 INJECTION INTRAMUSCULAR; INTRAVENOUS EVERY 30 MIN PRN
Status: DISCONTINUED | OUTPATIENT
Start: 2024-01-26 | End: 2024-01-27 | Stop reason: HOSPADM

## 2024-01-26 RX ORDER — SODIUM CHLORIDE, SODIUM LACTATE, POTASSIUM CHLORIDE, CALCIUM CHLORIDE 600; 310; 30; 20 MG/100ML; MG/100ML; MG/100ML; MG/100ML
INJECTION, SOLUTION INTRAVENOUS CONTINUOUS
Status: DISCONTINUED | OUTPATIENT
Start: 2024-01-26 | End: 2024-01-27 | Stop reason: HOSPADM

## 2024-01-26 RX ORDER — ONDANSETRON 2 MG/ML
INJECTION INTRAMUSCULAR; INTRAVENOUS PRN
Status: DISCONTINUED | OUTPATIENT
Start: 2024-01-26 | End: 2024-01-26

## 2024-01-26 RX ORDER — NALOXONE HYDROCHLORIDE 0.4 MG/ML
0.2 INJECTION, SOLUTION INTRAMUSCULAR; INTRAVENOUS; SUBCUTANEOUS
Status: DISCONTINUED | OUTPATIENT
Start: 2024-01-26 | End: 2024-01-27 | Stop reason: HOSPADM

## 2024-01-26 RX ORDER — FENTANYL CITRATE 50 UG/ML
25-100 INJECTION, SOLUTION INTRAMUSCULAR; INTRAVENOUS
Status: DISCONTINUED | OUTPATIENT
Start: 2024-01-26 | End: 2024-01-27 | Stop reason: HOSPADM

## 2024-01-26 RX ORDER — NALOXONE HYDROCHLORIDE 0.4 MG/ML
0.4 INJECTION, SOLUTION INTRAMUSCULAR; INTRAVENOUS; SUBCUTANEOUS
Status: DISCONTINUED | OUTPATIENT
Start: 2024-01-26 | End: 2024-01-27 | Stop reason: HOSPADM

## 2024-01-26 RX ORDER — ONDANSETRON 4 MG/1
4 TABLET, ORALLY DISINTEGRATING ORAL EVERY 30 MIN PRN
Status: DISCONTINUED | OUTPATIENT
Start: 2024-01-26 | End: 2024-01-27 | Stop reason: HOSPADM

## 2024-01-26 RX ORDER — OXYCODONE HYDROCHLORIDE 5 MG/1
5 TABLET ORAL
Status: DISCONTINUED | OUTPATIENT
Start: 2024-01-26 | End: 2024-01-27 | Stop reason: HOSPADM

## 2024-01-26 RX ORDER — FLUMAZENIL 0.1 MG/ML
0.2 INJECTION, SOLUTION INTRAVENOUS
Status: DISCONTINUED | OUTPATIENT
Start: 2024-01-26 | End: 2024-01-27 | Stop reason: HOSPADM

## 2024-01-26 RX ORDER — FENTANYL CITRATE 50 UG/ML
25 INJECTION, SOLUTION INTRAMUSCULAR; INTRAVENOUS EVERY 5 MIN PRN
Status: DISCONTINUED | OUTPATIENT
Start: 2024-01-26 | End: 2024-01-27 | Stop reason: HOSPADM

## 2024-01-26 RX ORDER — ONDANSETRON 4 MG/1
4 TABLET, ORALLY DISINTEGRATING ORAL EVERY 8 HOURS PRN
Qty: 4 TABLET | Refills: 0 | Status: SHIPPED | OUTPATIENT
Start: 2024-01-26 | End: 2024-03-11

## 2024-01-26 RX ORDER — CEFAZOLIN SODIUM 2 G/50ML
2 SOLUTION INTRAVENOUS
Status: COMPLETED | OUTPATIENT
Start: 2024-01-26 | End: 2024-01-26

## 2024-01-26 RX ORDER — PROPOFOL 10 MG/ML
INJECTION, EMULSION INTRAVENOUS CONTINUOUS PRN
Status: DISCONTINUED | OUTPATIENT
Start: 2024-01-26 | End: 2024-01-26

## 2024-01-26 RX ORDER — HEPARIN SODIUM 5000 [USP'U]/ML
5000 INJECTION, SOLUTION INTRAVENOUS; SUBCUTANEOUS ONCE
Status: COMPLETED | OUTPATIENT
Start: 2024-01-26 | End: 2024-01-26

## 2024-01-26 RX ORDER — ACETAMINOPHEN 325 MG/1
975 TABLET ORAL ONCE
Status: COMPLETED | OUTPATIENT
Start: 2024-01-26 | End: 2024-01-26

## 2024-01-26 RX ORDER — LIDOCAINE HYDROCHLORIDE 20 MG/ML
INJECTION, SOLUTION INFILTRATION; PERINEURAL PRN
Status: DISCONTINUED | OUTPATIENT
Start: 2024-01-26 | End: 2024-01-26

## 2024-01-26 RX ORDER — OXYCODONE HYDROCHLORIDE 5 MG/1
5-10 TABLET ORAL EVERY 6 HOURS PRN
Qty: 20 TABLET | Refills: 0 | Status: SHIPPED | OUTPATIENT
Start: 2024-01-26 | End: 2024-03-11

## 2024-01-26 RX ORDER — OXYCODONE HYDROCHLORIDE 5 MG/1
10 TABLET ORAL
Status: COMPLETED | OUTPATIENT
Start: 2024-01-26 | End: 2024-01-26

## 2024-01-26 RX ORDER — LIDOCAINE 40 MG/G
CREAM TOPICAL
Status: DISCONTINUED | OUTPATIENT
Start: 2024-01-26 | End: 2024-01-27 | Stop reason: HOSPADM

## 2024-01-26 RX ORDER — KETOROLAC TROMETHAMINE 30 MG/ML
30 INJECTION, SOLUTION INTRAMUSCULAR; INTRAVENOUS ONCE
Status: COMPLETED | OUTPATIENT
Start: 2024-01-26 | End: 2024-01-26

## 2024-01-26 RX ORDER — PROPOFOL 10 MG/ML
INJECTION, EMULSION INTRAVENOUS PRN
Status: DISCONTINUED | OUTPATIENT
Start: 2024-01-26 | End: 2024-01-26

## 2024-01-26 RX ORDER — BUPIVACAINE HYDROCHLORIDE AND EPINEPHRINE 2.5; 5 MG/ML; UG/ML
INJECTION, SOLUTION INFILTRATION; PERINEURAL
Status: COMPLETED | OUTPATIENT
Start: 2024-01-26 | End: 2024-01-26

## 2024-01-26 RX ORDER — DEXAMETHASONE SODIUM PHOSPHATE 4 MG/ML
INJECTION, SOLUTION INTRA-ARTICULAR; INTRALESIONAL; INTRAMUSCULAR; INTRAVENOUS; SOFT TISSUE PRN
Status: DISCONTINUED | OUTPATIENT
Start: 2024-01-26 | End: 2024-01-26

## 2024-01-26 RX ORDER — FENTANYL CITRATE 50 UG/ML
50 INJECTION, SOLUTION INTRAMUSCULAR; INTRAVENOUS
Status: COMPLETED | OUTPATIENT
Start: 2024-01-26 | End: 2024-01-26

## 2024-01-26 RX ADMIN — DEXMEDETOMIDINE HYDROCHLORIDE 8 MCG: 4 INJECTION, SOLUTION INTRAVENOUS at 08:20

## 2024-01-26 RX ADMIN — FENTANYL CITRATE 50 MCG: 50 INJECTION, SOLUTION INTRAMUSCULAR; INTRAVENOUS at 06:54

## 2024-01-26 RX ADMIN — Medication 0.5 MG: at 09:49

## 2024-01-26 RX ADMIN — KETOROLAC TROMETHAMINE 30 MG: 30 INJECTION, SOLUTION INTRAMUSCULAR; INTRAVENOUS at 10:42

## 2024-01-26 RX ADMIN — OXYCODONE HYDROCHLORIDE 10 MG: 5 TABLET ORAL at 09:58

## 2024-01-26 RX ADMIN — Medication 0.5 MG: at 10:17

## 2024-01-26 RX ADMIN — CEFAZOLIN SODIUM 2 G: 2 SOLUTION INTRAVENOUS at 07:05

## 2024-01-26 RX ADMIN — HEPARIN SODIUM 5000 UNITS: 5000 INJECTION, SOLUTION INTRAVENOUS; SUBCUTANEOUS at 06:42

## 2024-01-26 RX ADMIN — PROPOFOL 200 MG: 10 INJECTION, EMULSION INTRAVENOUS at 07:16

## 2024-01-26 RX ADMIN — FENTANYL CITRATE 50 MCG: 50 INJECTION, SOLUTION INTRAMUSCULAR; INTRAVENOUS at 07:47

## 2024-01-26 RX ADMIN — Medication 50 MG: at 07:16

## 2024-01-26 RX ADMIN — PROPOFOL 50 MCG/KG/MIN: 10 INJECTION, EMULSION INTRAVENOUS at 07:14

## 2024-01-26 RX ADMIN — FENTANYL CITRATE 50 MCG: 50 INJECTION, SOLUTION INTRAMUSCULAR; INTRAVENOUS at 07:15

## 2024-01-26 RX ADMIN — DEXAMETHASONE SODIUM PHOSPHATE 4 MG: 4 INJECTION, SOLUTION INTRA-ARTICULAR; INTRALESIONAL; INTRAMUSCULAR; INTRAVENOUS; SOFT TISSUE at 07:22

## 2024-01-26 RX ADMIN — SODIUM CHLORIDE, SODIUM LACTATE, POTASSIUM CHLORIDE, CALCIUM CHLORIDE: 600; 310; 30; 20 INJECTION, SOLUTION INTRAVENOUS at 06:27

## 2024-01-26 RX ADMIN — ACETAMINOPHEN 975 MG: 325 TABLET ORAL at 06:13

## 2024-01-26 RX ADMIN — ONDANSETRON 4 MG: 2 INJECTION INTRAMUSCULAR; INTRAVENOUS at 08:18

## 2024-01-26 RX ADMIN — Medication 0.2 MG: at 09:17

## 2024-01-26 RX ADMIN — ONDANSETRON 4 MG: 2 INJECTION INTRAMUSCULAR; INTRAVENOUS at 10:42

## 2024-01-26 RX ADMIN — Medication 0.3 MG: at 09:01

## 2024-01-26 RX ADMIN — LIDOCAINE HYDROCHLORIDE 100 MG: 20 INJECTION, SOLUTION INFILTRATION; PERINEURAL at 07:16

## 2024-01-26 RX ADMIN — Medication 10 MG: at 08:03

## 2024-01-26 RX ADMIN — BUPIVACAINE HYDROCHLORIDE AND EPINEPHRINE 20 ML: 2.5; 5 INJECTION, SOLUTION INFILTRATION; PERINEURAL at 06:58

## 2024-01-26 NOTE — ANESTHESIA PROCEDURE NOTES
"TAP Procedure Note    Pre-Procedure   Staff -        Anesthesiologist:  Jony Momin MD       Performed By: anesthesiologist       Location: pre-op       Pre-Anesthestic Checklist: patient identified, IV checked, site marked, risks and benefits discussed, informed consent, monitors and equipment checked, pre-op evaluation, at physician/surgeon's request and post-op pain management  Timeout:       Correct Patient: Yes        Correct Procedure: Yes        Correct Site: Yes        Correct Position: Yes        Correct Laterality: Yes        Site Marked: Yes  Procedure Documentation  Procedure: TAP       Laterality: bilateral       Patient Position: supine       Skin prep: Chloraprep       Needle Type: insulated       Needle Gauge: 20.        Needle Length (millimeters): 110        Ultrasound guided       1. Ultrasound was used to identify targeted nerve, plexus, vascular marker, or fascial plane and place a needle adjacent to it in real-time.       2. Ultrasound was used to visualize the spread of anesthetic in close proximity to the above referenced structure.       3. A permanent image is entered into the patient's record.    Assessment/Narrative         The placement was negative for: blood aspirated and painful injection       Paresthesias: No.       Bolus given via needle..        Secured via.        Insertion/Infusion Method: Single Shot    Medication(s) Administered   Bupivacaine 0.25% w/ 1:200K Epi (Injection) - Injection   20 mL - 1/26/2024 6:58:00 AM  Bupivacaine liposome (Exparel) 1.3% LA inj susp (Infiltration) - Infiltration   20 mL - 1/26/2024 7:00:00 AM   Comments:  Bilateral TAP blocks.  10 ml 0.25% bupivacaine with 1:200,000 EPI to each side  130 mg Exparel in 10 ml Normal Saline injected to each side  Ultrasound guided      FOR [facility] ([facility]) ONLY:   Pain Team Contact information: please page the Pain Team Via ralali. Search \"Pain\". During daytime hours, please page the attending first. At " night please page the resident first.

## 2024-01-26 NOTE — PROGRESS NOTES
"No change in History and Physical since the last visit.  I went over the planned procedure in detail today.  All risks, benefits and alternatives were explained in detail again.  All questions were answered.  The patient understood the plan and all that was discussed and wants to proceed with the planned procedure today.  The patient understands the team approach to care in the operating room, potential overlapping of cases in rooms and agrees to the procedure as such.  The patient has been cleared by medicine for this procedure and all laboratory tests are stable.    Re-iterated that she is at a higher risk for wound issues given her history of smoking.     She stated she wanted an \"outie\" belly button but we explained that that is not possible and that we make a standard \"innie\". She understood and agreed.  "

## 2024-01-26 NOTE — OP NOTE
PREOPERATIVE DIAGNOSIS:  Symptomatic excess abdominal wall skin.        POSTOPERATIVE DIAGNOSIS:  Symptomatic excess abdominal wall skin.        PROCEDURE:  Abdominoplasty and panniculectomy.        SURGEON:  Kvng Linares MD      RESIDENT: Albin Lnych MD.      ANESTHESIA:  General anesthesia with endotracheal intubation.        COMPLICATIONS:  Nil.        DRAINS:  None      SPECIMENS:  Nil.      BLOOD LOSS: 100 mL.      DESCRIPTION OF PROCEDURE:  After informed consent was taken from the patient, the proper site and procedure was ascertained with the patient, the patient was appropriately marked and taken to the operating room.  She was placed in a supine position with her knees comfortably flexed, pillows underneath them and pneumoboots placed and running prior to induction of anesthesia.  Preoperative antibiotics were given in the OR.  All pressure points were appropriately padded.  General anesthesia was administered without any complications. Her entire lower chest, abdomen, and upper thigh and groin area were prepped and draped in a standard surgical fashion.  She had preoperatively been marked for her abdominoplasty.  I went ahead and remarked the areas.  I then went ahead and made my inferior incision marked out by potential inferior incision by lifting up on the mons as well as on the lateral abdominal region on each side and while doing that made a symmetric marking about 7 cm from the external genitalia and a few centimeters from the pubic tubercle in the mons area.  A horizontal marking was made symmetrically on each side and then within the hair bearing area and then from that lateral aspect of each line marked out passing through the plane of anterior superior iliac spine to join the preop markings of the lateral extent of the excision.  Once this was done, I then made my incision, dissected using electrocautery, dissected through the subcutaneous tissues down to the Ritesh's layer, elevated the  skin and subcutaneous tissue in a cephalad direction superficial to the Ritesh's layer until I was outside the groin and then dove deep to the Ritesh's layer and elevated the skin and subcutaneous tissue in a cephalad direction just anterior to the anterior abdominal wall, leaving a thin layer of loose areolar tissue on the anterior abdominal wall.  All of this was done in an effort to decrease seroma formation.  During this dissection any large perforators were clipped, ligated and controlled.  Dissection was carried out in a cephalad direction elevating the skin and subcutaneous tissues towards the umbilical plane.  Prior to reaching the umbilicus, I then turned my attention to the umbilicus.  I went ahead and placed a 12 o'clock and 6 o'clock suture and then cut around the umbilicus.  I dissected along the stalk down to the anterior abdominal wall, taking care to feel for any obvious hernia and none was felt.  The lower skin flap was then divided and dissection was carried out around the umbilicus cephalad to the umbilicus I continued my dissection towards the xiphoid in the midline and to the costal margins laterally.  The further lateral I went the less dissection I did to keep the zone 3 intact.  Once dissection in a cephalad direction was done to a point where I felt that I could get plication as well as appropriate redraping of the abdominal wall I stopped this dissection.  I ensured hemostasis.  I then plicated the anterior abdominal wall.  I marked the midline then estimated the redundancy.  I made sure that the patient was relaxed and then used multiple 0 Ethibond suture in an interrupted fashion to plicate the anterior abdominal rectus sheath.  Once this was completed, I then turned my attention to the umbilicus.  I trimmed it appropriately, then tacked down the 3 o'clock, 6 o'clock and 9 o'clock portions to the anterior abdominal wall. I then turned my attention to the excess skin of the anterior  abdominal wall.  I flexed the patient to about 30 degrees and then estimated the excess skin and excised it.  Again, hemostasis was ensured.  Once this was completed, I then marked the new position where the umbilicus would come through. I then used 2-0 Strattafix sutures to quilt the abdominal wall.  I then closed the incision using #0 PDS suture in the superficial fascial system followed by 2-0 Monocryl sutures for the deep dermal layer and then 3-0 Monocryl Strattafix in a running intracuticular manner. The umbilical opening was then made and the umbilicus was sewn in with 2-0 Monocryl sutures and 4-0 plain gut sutures. The umbilicus was covered with antibiotic ointment and a dry dressing. Prineo was placed over the abdominal incision. An abdominal binder was placed.  The patient tolerated the procedure well.  All counts correct at the end of the case.  The patient was extubated and transferred to a hospital bed in a flexed position.  She was then transferred to the PACU in a stable condition.  All counts were correct at the end of the case.

## 2024-01-26 NOTE — ANESTHESIA PROCEDURE NOTES
Airway       Patient location during procedure: OR       Procedure Start/Stop Times: 1/26/2024 7:18 AM  Staff -        CRNA: Ambreen Marquez APRN CRNA       Performed By: CRNA  Consent for Airway        Urgency: elective  Indications and Patient Condition       Indications for airway management: michael-procedural       Induction type:intravenous       Mask difficulty assessment: 1 - vent by mask    Final Airway Details       Final airway type: endotracheal airway       Successful airway: ETT - single  Endotracheal Airway Details        ETT size (mm): 6.5       Cuffed: yes       Successful intubation technique: direct laryngoscopy       DL Blade Type: Zavaleta 2       Grade View of Cords: 1       Adjucts: stylet       Position: Right       Measured from: lips       Secured at (cm): 22       Bite block used: None    Post intubation assessment        Placement verified by: capnometry, equal breath sounds and chest rise        Number of attempts at approach: 1       Number of other approaches attempted: 0       Secured with: tape       Ease of procedure: easy       Dentition: Intact    Medication(s) Administered   Medication Administration Time: 1/26/2024 7:18 AM

## 2024-01-26 NOTE — ANESTHESIA POSTPROCEDURE EVALUATION
Patient: Kesha David    Procedure: Procedure(s):  ABDOMINOPLASTY, WITH PANNICULECTOMY       Anesthesia Type:  General    Note:  Disposition: Outpatient   Postop Pain Control: Uneventful            Sign Out: Well controlled pain   PONV: No   Neuro/Psych: Uneventful            Sign Out: Acceptable/Baseline neuro status   Airway/Respiratory: Uneventful            Sign Out: Acceptable/Baseline resp. status   CV/Hemodynamics: Uneventful            Sign Out: Acceptable CV status; No obvious hypovolemia; No obvious fluid overload   Other NRE: NONE   DID A NON-ROUTINE EVENT OCCUR?            Last vitals:  Vitals Value Taken Time   /83 01/26/24 0947   Temp 97.2  F (36.2  C) 01/26/24 0947   Pulse 79 01/26/24 1026   Resp 14 01/26/24 1026   SpO2 97 % 01/26/24 1026   Vitals shown include unfiled device data.    Electronically Signed By: Jony Momin MD  January 26, 2024  10:27 AM

## 2024-01-26 NOTE — BRIEF OP NOTE
[facility]    Brief Operative Note    Pre-operative diagnosis: Excess skin of abdomen [L98.7]  Post-operative diagnosis Same as pre-operative diagnosis    Procedure: ABDOMINOPLASTY, WITH PANNICULECTOMY, N/A - Abdomen    Surgeon: Surgeon(s) and Role:     * TR Linares MD - Primary  Anesthesia: General with Block   Estimated Blood Loss: 100 mL from 1/26/2024  7:10 AM to 1/26/2024  9:45 AM      Drains: None  Specimens: * No specimens in log *  Findings:   None.  Complications: None.  Implants: * No implants in log *        MINH Beltran, MS  Plastic Surgery, PGY-4

## 2024-01-26 NOTE — DISCHARGE INSTRUCTIONS
You had 975 mg of Tylenol at 6:30 AM. You may repeat this after 12:30 PM. Maximum amount of Tylenol/Acetaminophen in a 24 hour period is 4,000 mg.      ABDOMINOPLASTY and/or PANNICULECTOMY POST-OPERATIVE INSTRUCTIONS    Instructions  Depending on the complexity of your surgery, you may need to be    admitted to the hospital for a few days.   Have someone drive you home after surgery and help you at home for week.   Get plenty of rest.   Follow balanced diet.   Decreased activity may promote constipation, so you may want to add    more raw fruit to your diet, and be sure to increase fluid intake.   Take pain medication as prescribed. Do not take aspirin or any products     containing aspirin.   Do not drink alcohol when taking pain medications.   Even when not taking pain medications, no alcohol for 3 weeks as it     causes fluid retention.   Do not smoke, as smoking delays healing and increases the risk of  complications.   If you are provided with an abdominal binder, wear it as instructed. Do     not wear a compression garment unless approved by your physician.    Activities   Turning on your side and pushing off with your arm when getting out of     bed will reduce stress on your incision.   Start walking as soon as possible, as this helps to reduce swelling and     lowers the chance of blood clots.   Do not drive until you are no longer taking any pain medications    (narcotics).    Do not drive until you have full range of motion in your legs and no    discomfort in your abdomen when lifting your legs.   No lifting greater than 5 lbs. for 6 weeks.   Resume sexual activity as comfort permits, usually 2-3 weeks     postoperatively.   Avoid straining of abdominal muscles. Strenuous exercise and activities     are restricted for 6 weeks.   Return to work in 3- 6 weeks as directed by your surgeon.    Incision Care   Your surgeon may use staples or sutures to close your incision. You may also     have 2 to 4 drains  inserted following your surgery.   You may shower 48 hours after removal of the drainage tubes. Drains     may stay in for several weeks.   Avoid exposing scars to sun for at least 12 months.   Always use a strong sunblock, if sun exposure is unavoidable (SPF 50 or    greater).   Keep the tape on and allow it to peel off over time.   Keep incisions clean and inspect daily for signs of infection.   Start moisturizing your abdomen and scar by day 10 after surgery.   No tub soaking while sutures or drains are in place.   May wear soft support underpants for comfort; may pad incision with     dressings for comfort.   Sleep with pillow under knees and head elevated on 2 pillows.   No tub soaking, bathing, or swimming while sutures or drains are in place.   May wear soft support underpants for comfort; may pad incision with    dressings for comfort.   Sleep with pillow under knees and head elevated on 2 pillows.    What to Expect   You may experience temporary pain, soreness, numbness of abdominal     skin, incision discomfort.   Maximum discomfort will occur the first few days.   You will have bruising and swelling of the abdomen. The majority of     bruising and swelling will subside in 6-8 weeks.   You may feel tired for several weeks or months.   One or more of your drains may remain in for several weeks.    Appearance   This procedure only removes the pannus, it does not narrow your    waistline.   You will walk slightly bent forward and gradually return to normal  posture over next 3 weeks.   Scars will be reddened for 6 months. After that, they will fade and soften.   The scar will extend from near one hipbone to the other, low on the  abdomen.     Follow-Up Care   Abdominal drains removed when less than 30 ml for 24 hours.   Usually, absorbable stitches are used. Surface staples (if used) removed in 2 weeks but may remain in longer if there is concern of incision separation.    Please note my office will call you  1-2 business days after your procedure to check up on how you're doing and to schedule your post-operative appointment.     When to Call   If you have increased swelling or bruising.   If swelling and redness persist after a few days.   If you have increased redness along the incision. If you have severe or                increased pain not relieved by medication.   If you have any side effects to medications; such as, rash, nausea,    headache, vomiting.   If you have an oral temperature over 100.4 degrees.   If you have any yellowish or greenish drainage from the incisions or    notice a foul odor.     If you have bleeding from the incisions that is difficult to control with light pressure.   If you have loss of feeling or motion.     For Medical Questions, Please Call:   [phone], Monday-Friday, 8 a.m.- 4:30 pm    After hours and on weekends, call Hospital Paging at [phone] and ask for the Plastic Surgeon on call.

## 2024-01-26 NOTE — ANESTHESIA CARE TRANSFER NOTE
Patient: Kesha David    Procedure: Procedure(s):  ABDOMINOPLASTY, WITH PANNICULECTOMY       Diagnosis: Excess skin of abdomen [L98.7]  Diagnosis Additional Information: No value filed.    Anesthesia Type:   General     Note:    Oropharynx: oropharynx clear of all foreign objects  Level of Consciousness: awake  Oxygen Supplementation: face mask  Level of Supplemental Oxygen (L/min / FiO2): 8  Independent Airway: airway patency satisfactory and stable  Dentition: dentition unchanged  Vital Signs Stable: post-procedure vital signs reviewed and stable  Report to RN Given: handoff report given  Patient transferred to: PACU  Comments: Anesthesia Care Transfer Note    Patient: Kesha David    Transferred to: PACU with supplemental O2    Patient vital signs: stable    Airway: none    Monitors on, VSS, breathing spontaneously, report to RN      Ambreen Dixon CRNA   1/26/2024 9:51 AM   Handoff Report: Identifed the Patient, Identified the Reponsible Provider, Reviewed the pertinent medical history, Discussed the surgical course, Reviewed Intra-OP anesthesia mangement and issues during anesthesia, Set expectations for post-procedure period and Allowed opportunity for questions and acknowledgement of understanding    Vitals:  Vitals Value Taken Time   /83 01/26/24 0947   Temp     Pulse 83 01/26/24 0947   Resp 25 01/26/24 0949   SpO2 100 % 01/26/24 0949   Vitals shown include unfiled device data.    Electronically Signed By: MARANDA Giraldo CRNA  January 26, 2024  9:50 AM
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
## 2024-02-06 DIAGNOSIS — L73.2 HIDRADENITIS SUPPURATIVA: ICD-10-CM

## 2024-02-06 RX ORDER — DOXYCYCLINE 100 MG/1
100 CAPSULE ORAL 2 TIMES DAILY
Qty: 20 CAPSULE | Refills: 0 | Status: SHIPPED | OUTPATIENT
Start: 2024-02-06 | End: 2024-03-01

## 2024-02-09 ENCOUNTER — OFFICE VISIT (OUTPATIENT)
Dept: SURGERY | Facility: CLINIC | Age: 54
End: 2024-02-09
Payer: COMMERCIAL

## 2024-02-09 DIAGNOSIS — L98.7 EXCESS SKIN OF ABDOMEN: Primary | ICD-10-CM

## 2024-02-09 PROCEDURE — 99024 POSTOP FOLLOW-UP VISIT: CPT | Performed by: PLASTIC SURGERY

## 2024-02-09 NOTE — NURSING NOTE
Kesha David's goals for this visit include:   Chief Complaint   Patient presents with    RECHECK     2 week post-op: DOS 1/26 Abdominoplasty and Panniculectomy       She requests these members of her care team be copied on today's visit information:     PCP: Rosi Soriano    Referring Provider:  No referring provider defined for this encounter.    LMP 12/01/2016 (Approximate)     Do you need any medication refills at today's visit?     Anabel Veloz MA on 2/9/2024 at 2:55 PM

## 2024-02-09 NOTE — LETTER
2/9/2024         RE: Kesha David  [address] [facility] [address]        Dear Colleague,    Thank you for referring your patient, Kesha David, to the [facility]. Please see a copy of my visit note below.    PRESENTING COMPLAINT:  Post-operative visit s/p abdominoplasty and panniculectomy done 1/26/2024.     HISTORY OF PRESENTING COMPLAINT: The patient is here for post-operative visit.  The patient is being seen in the presence of my nurse.     Doing well.  No major issues.  Happy with results.    On exam: Vital signs stable afebrile.  Incision healing well.  No evidence for infection seroma hematoma.     ASSESSMENT AND PLAN:  Based upon the above findings, the patient is here for post-operative visit.     Advised aggressive moisturization.  4 more weeks of no heavy lifting or exercising.  See us back in 4 to 6 weeks.    All questions were answered.  The patient was happy with the visit.      Again, thank you for allowing me to participate in the care of your patient.        Sincerely,        TR Linares MD

## 2024-02-10 NOTE — PROGRESS NOTES
PRESENTING COMPLAINT:  Post-operative visit s/p abdominoplasty and panniculectomy done 1/26/2024.     HISTORY OF PRESENTING COMPLAINT: The patient is here for post-operative visit.  The patient is being seen in the presence of my nurse.     Doing well.  No major issues.  Happy with results.    On exam: Vital signs stable afebrile.  Incision healing well.  No evidence for infection seroma hematoma.     ASSESSMENT AND PLAN:  Based upon the above findings, the patient is here for post-operative visit.     Advised aggressive moisturization.  4 more weeks of no heavy lifting or exercising.  See us back in 4 to 6 weeks.    All questions were answered.  The patient was happy with the visit.

## 2024-03-01 DIAGNOSIS — J45.30 MILD PERSISTENT ASTHMA WITHOUT COMPLICATION: ICD-10-CM

## 2024-03-01 DIAGNOSIS — L73.2 HIDRADENITIS SUPPURATIVA: ICD-10-CM

## 2024-03-01 RX ORDER — FLUTICASONE FUROATE AND VILANTEROL 200; 25 UG/1; UG/1
1 POWDER RESPIRATORY (INHALATION) EVERY MORNING
Qty: 60 EACH | Refills: 0 | Status: SHIPPED | OUTPATIENT
Start: 2024-03-01 | End: 2024-03-11

## 2024-03-01 RX ORDER — DOXYCYCLINE 100 MG/1
100 CAPSULE ORAL 2 TIMES DAILY
Qty: 20 CAPSULE | Refills: 0 | Status: SHIPPED | OUTPATIENT
Start: 2024-03-01 | End: 2024-04-02

## 2024-03-11 ENCOUNTER — OFFICE VISIT (OUTPATIENT)
Dept: FAMILY MEDICINE | Facility: CLINIC | Age: 54
End: 2024-03-11
Payer: COMMERCIAL

## 2024-03-11 ENCOUNTER — HOSPITAL ENCOUNTER (OUTPATIENT)
Dept: GENERAL RADIOLOGY | Facility: CLINIC | Age: 54
Discharge: HOME | End: 2024-03-11
Attending: PHYSICIAN ASSISTANT | Admitting: PHYSICIAN ASSISTANT
Payer: COMMERCIAL

## 2024-03-11 VITALS
OXYGEN SATURATION: 99 % | RESPIRATION RATE: 16 BRPM | TEMPERATURE: 97.3 F | SYSTOLIC BLOOD PRESSURE: 124 MMHG | DIASTOLIC BLOOD PRESSURE: 72 MMHG | BODY MASS INDEX: 30.29 KG/M2 | HEART RATE: 80 BPM | WEIGHT: 193 LBS | HEIGHT: 67 IN

## 2024-03-11 DIAGNOSIS — E65 ABDOMINAL PANNUS: ICD-10-CM

## 2024-03-11 DIAGNOSIS — Z12.31 VISIT FOR SCREENING MAMMOGRAM: ICD-10-CM

## 2024-03-11 DIAGNOSIS — F33.1 MAJOR DEPRESSIVE DISORDER, RECURRENT EPISODE, MODERATE (H): Primary | ICD-10-CM

## 2024-03-11 DIAGNOSIS — M54.16 LUMBAR RADICULOPATHY: ICD-10-CM

## 2024-03-11 DIAGNOSIS — F41.1 GAD (GENERALIZED ANXIETY DISORDER): ICD-10-CM

## 2024-03-11 DIAGNOSIS — J45.30 MILD PERSISTENT ASTHMA WITHOUT COMPLICATION: ICD-10-CM

## 2024-03-11 DIAGNOSIS — Z87.898 HISTORY OF CHRONIC COUGH: ICD-10-CM

## 2024-03-11 DIAGNOSIS — F10.10 ETOH ABUSE: ICD-10-CM

## 2024-03-11 DIAGNOSIS — R56.9 SEIZURE (H): ICD-10-CM

## 2024-03-11 DIAGNOSIS — Z98.84 S/P GASTRIC BYPASS: ICD-10-CM

## 2024-03-11 DIAGNOSIS — L73.2 HIDRADENITIS SUPPURATIVA: ICD-10-CM

## 2024-03-11 PROBLEM — F10.21 ALCOHOL USE DISORDER, MODERATE, IN EARLY REMISSION (H): Status: RESOLVED | Noted: 2018-06-19 | Resolved: 2024-03-11

## 2024-03-11 PROCEDURE — 99214 OFFICE O/P EST MOD 30 MIN: CPT | Performed by: PHYSICIAN ASSISTANT

## 2024-03-11 PROCEDURE — 72100 X-RAY EXAM L-S SPINE 2/3 VWS: CPT

## 2024-03-11 RX ORDER — ONDANSETRON 4 MG/1
4 TABLET, ORALLY DISINTEGRATING ORAL EVERY 8 HOURS PRN
Qty: 12 TABLET | Refills: 0 | Status: SHIPPED | OUTPATIENT
Start: 2024-03-11 | End: 2024-06-03

## 2024-03-11 RX ORDER — LAMOTRIGINE 25 MG/1
50 TABLET ORAL DAILY
Qty: 180 TABLET | Refills: 1 | Status: SHIPPED | OUTPATIENT
Start: 2024-03-11 | End: 2024-09-23

## 2024-03-11 RX ORDER — FLUTICASONE FUROATE AND VILANTEROL 200; 25 UG/1; UG/1
1 POWDER RESPIRATORY (INHALATION) EVERY MORNING
Qty: 60 EACH | Refills: 3 | Status: SHIPPED | OUTPATIENT
Start: 2024-03-11 | End: 2024-05-14

## 2024-03-11 RX ORDER — ALBUTEROL SULFATE 90 UG/1
2 AEROSOL, METERED RESPIRATORY (INHALATION) EVERY 6 HOURS
Qty: 18 G | Refills: 1 | Status: SHIPPED | OUTPATIENT
Start: 2024-03-11

## 2024-03-11 RX ORDER — DULOXETIN HYDROCHLORIDE 60 MG/1
60 CAPSULE, DELAYED RELEASE ORAL DAILY
Qty: 90 CAPSULE | Refills: 3 | Status: SHIPPED | OUTPATIENT
Start: 2024-03-11

## 2024-03-11 RX ORDER — TOPIRAMATE 25 MG/1
25 TABLET, FILM COATED ORAL DAILY
Qty: 90 TABLET | Refills: 1 | Status: SHIPPED | OUTPATIENT
Start: 2024-03-11 | End: 2024-08-23

## 2024-03-11 RX ORDER — PHENTERMINE HYDROCHLORIDE 37.5 MG/1
1 TABLET ORAL
Qty: 30 TABLET | Refills: 2 | Status: SHIPPED | OUTPATIENT
Start: 2024-03-11 | End: 2024-06-26

## 2024-03-11 RX ORDER — NALTREXONE HYDROCHLORIDE 50 MG/1
50 TABLET, FILM COATED ORAL DAILY
Qty: 90 TABLET | Refills: 1 | Status: SHIPPED | OUTPATIENT
Start: 2024-03-11 | End: 2024-09-23

## 2024-03-11 ASSESSMENT — ASTHMA QUESTIONNAIRES: ACT_TOTALSCORE: 25

## 2024-03-11 ASSESSMENT — PAIN SCALES - GENERAL: PAINLEVEL: MODERATE PAIN (4)

## 2024-03-11 ASSESSMENT — PATIENT HEALTH QUESTIONNAIRE - PHQ9
10. IF YOU CHECKED OFF ANY PROBLEMS, HOW DIFFICULT HAVE THESE PROBLEMS MADE IT FOR YOU TO DO YOUR WORK, TAKE CARE OF THINGS AT HOME, OR GET ALONG WITH OTHER PEOPLE: SOMEWHAT DIFFICULT
SUM OF ALL RESPONSES TO PHQ QUESTIONS 1-9: 8
SUM OF ALL RESPONSES TO PHQ QUESTIONS 1-9: 8

## 2024-03-11 NOTE — PROGRESS NOTES
Timi Rebolledo is a 54 year old, presenting for the following health issues:  Recheck Medication and Musculoskeletal Problem        3/11/2024     9:45 AM   Additional Questions   Roomed by Andreina COATES     History of Present Illness       Reason for visit:  Medication review, hip pain,  Symptom onset:  More than a month  Symptoms include:  Both hips, left is worse  Symptom intensity:  Moderate  Symptom progression:  Worsening    She eats 2-3 servings of fruits and vegetables daily.She consumes 0 sweetened beverage(s) daily.She exercises with enough effort to increase her heart rate 9 or less minutes per day.  She exercises with enough effort to increase her heart rate 3 or less days per week. She is missing 1 dose(s) of medications per week.  She is not taking prescribed medications regularly due to remembering to take.     Patient reports that for the last 6+ months she has had worsening low back pain into her hips and makes her hips feel tight. She reports this worsens with activity. Radiates down left leg. Has been seen by PT, chiropractor and has done myofascial release. This is the only thing that seemed to help. She has not had any imaging thus far.     Moods overall doing well with current medications. Wonders about Genesite testing. Just feels like she takes so many medications.     Frustrated by her weight. Had bariatric surgery in 2008. In January had skin surgery. She reports that despite them taking off 8 pounds of skin her weight is still up. Taking Phentermine 37.5 mg daily. On Naltrexone for alcohol history. Snacking/cravings for sweets really hard in the afternoon.     Asthma has been stable on current dose of Breo.     Still taking Chantix. Quit smoking before her surgery in January. Hasn't restarted but still struggling with cravings.         Review of Systems  Constitutional, HEENT, cardiovascular, pulmonary, GI, , musculoskeletal, neuro, skin, endocrine and psych systems are negative,  "except as otherwise noted.      Objective    /72   Pulse 80   Temp 97.3  F (36.3  C) (Temporal)   Resp 16   Ht 1.7 m (5' 6.93\")   Wt 87.5 kg (193 lb)   LMP 12/01/2016 (Approximate)   SpO2 99%   BMI 30.29 kg/m    Body mass index is 30.29 kg/m .  Physical Exam   GENERAL: alert and no distress  EYES: Eyes grossly normal to inspection, PERRL and conjunctivae and sclerae normal  HENT: ear canals and TM's normal, nose and mouth without ulcers or lesions  NECK: no adenopathy, no asymmetry, masses, or scars  RESP: lungs clear to auscultation - no rales, rhonchi or wheezes  CV: regular rate and rhythm, normal S1 S2, no S3 or S4, no murmur, click or rub, no peripheral edema   MS: No midline back tenderness. Pain in the left SI joint. Full ROM however pain with flexion/extension. Negative straight leg raise.   SKIN: no suspicious lesions or rashes  NEURO: Normal strength and tone, mentation intact and speech normal  PSYCH: mentation appears normal, affect normal/bright        Assessment & Plan     Major depressive disorder, recurrent episode, moderate (H)  Moods overall stable. She reports depression not nearly as bad as it has been in the past during this time of year. Inquires about Genesite testing. Gave information for Razia Read in ER.   - DULoxetine (CYMBALTA) 60 MG capsule; Take 1 capsule (60 mg) by mouth daily  - lamoTRIgine (LAMICTAL) 25 MG tablet; Take 2 tablets (50 mg) by mouth daily    Mild persistent asthma without complication  Stable. Continue current treatment without change.   - fluticasone-vilanterol (BREO ELLIPTA) 200-25 MCG/ACT inhaler; Inhale 1 puff into the lungs every morning    Seizure (H)  History of - no ongoing concern.     History of chronic cough  - albuterol (PROAIR HFA/PROVENTIL HFA/VENTOLIN HFA) 108 (90 Base) MCG/ACT inhaler; Inhale 2 puffs into the lungs every 6 hours    HONEY (generalized anxiety disorder)  - DULoxetine (CYMBALTA) 60 MG capsule; Take 1 capsule (60 mg) by mouth " daily    ETOH abuse - history  Patient reports doing well - cravings controlled with Naltexone.   - naltrexone (DEPADE/REVIA) 50 MG tablet; Take 1 tablet (50 mg) by mouth daily    Hidradenitis suppurativa  - phentermine (ADIPEX-P) 37.5 MG tablet; Take 1 tablet (37.5 mg) by mouth daily before breakfast  - topiramate (TOPAMAX) 25 MG tablet; Take 1 tablet (25 mg) by mouth daily    Abdominal pannus  - phentermine (ADIPEX-P) 37.5 MG tablet; Take 1 tablet (37.5 mg) by mouth daily before breakfast  - topiramate (TOPAMAX) 25 MG tablet; Take 1 tablet (25 mg) by mouth daily    S/P gastric bypass  Will add Topiramate at this time to further help with cravings.  Appropriate use, side effects and dose titration if needed discussed.  - phentermine (ADIPEX-P) 37.5 MG tablet; Take 1 tablet (37.5 mg) by mouth daily before breakfast  - topiramate (TOPAMAX) 25 MG tablet; Take 1 tablet (25 mg) by mouth daily    Lumbar radiculopathy  Xray obtained today. She has tried PT, chiro and myofascial release. Continue supportive cares. Does have muscle relaxants if needed. May need to pursue MRI.   - XR Lumbar Spine 2/3 Views; Future    Visit for screening mammogram  - MA SCREENING DIGITAL BILAT - Future  (s+30); Future    The patient indicates understanding of these issues and agrees with the plan.

## 2024-03-13 ENCOUNTER — TELEPHONE (OUTPATIENT)
Dept: FAMILY MEDICINE | Facility: CLINIC | Age: 54
End: 2024-03-13
Payer: COMMERCIAL

## 2024-03-13 DIAGNOSIS — M54.16 LUMBAR RADICULOPATHY: Primary | ICD-10-CM

## 2024-03-13 NOTE — TELEPHONE ENCOUNTER
Patient notified of orders being placed for an MRI of her back. Warm transfer to Radiology scheduling. Sandra Coburn LPN

## 2024-03-13 NOTE — TELEPHONE ENCOUNTER
FYI - Status Update    Who is Calling: patient    Update: Returning a call stating that she would like to have a MRI    Does caller want a call/response back: Yes     Could we [facility] this information to you [facility] or would you prefer to receive a phone call?:   Patient would prefer a phone call   Okay to leave a detailed message?: Yes at Cell number on file:    Telephone Information:   Mobile [phone]

## 2024-03-16 ENCOUNTER — HEALTH MAINTENANCE LETTER (OUTPATIENT)
Age: 54
End: 2024-03-16

## 2024-03-21 ENCOUNTER — HOSPITAL ENCOUNTER (OUTPATIENT)
Dept: MRI IMAGING | Facility: CLINIC | Age: 54
Discharge: HOME | End: 2024-03-21
Attending: PHYSICIAN ASSISTANT | Admitting: PHYSICIAN ASSISTANT
Payer: COMMERCIAL

## 2024-03-21 DIAGNOSIS — M54.16 LUMBAR RADICULOPATHY: ICD-10-CM

## 2024-03-21 PROCEDURE — 72148 MRI LUMBAR SPINE W/O DYE: CPT

## 2024-03-29 ENCOUNTER — HOSPITAL ENCOUNTER (OUTPATIENT)
Dept: MAMMOGRAPHY | Facility: CLINIC | Age: 54
Discharge: HOME | End: 2024-03-29
Attending: PHYSICIAN ASSISTANT | Admitting: PHYSICIAN ASSISTANT
Payer: COMMERCIAL

## 2024-03-29 DIAGNOSIS — Z12.31 VISIT FOR SCREENING MAMMOGRAM: ICD-10-CM

## 2024-03-29 PROCEDURE — 77063 BREAST TOMOSYNTHESIS BI: CPT

## 2024-04-01 DIAGNOSIS — L73.2 HIDRADENITIS SUPPURATIVA: ICD-10-CM

## 2024-04-01 DIAGNOSIS — F17.200 TOBACCO USE DISORDER: ICD-10-CM

## 2024-04-01 RX ORDER — VARENICLINE TARTRATE 1 MG/1
1 TABLET, FILM COATED ORAL 2 TIMES DAILY
Qty: 60 TABLET | Refills: 2 | Status: SHIPPED | OUTPATIENT
Start: 2024-04-01 | End: 2024-06-26

## 2024-04-02 RX ORDER — DOXYCYCLINE 100 MG/1
100 CAPSULE ORAL 2 TIMES DAILY
Qty: 60 CAPSULE | Refills: 1 | Status: SHIPPED | OUTPATIENT
Start: 2024-04-02 | End: 2024-05-14

## 2024-04-05 ENCOUNTER — OFFICE VISIT (OUTPATIENT)
Dept: SURGERY | Facility: CLINIC | Age: 54
End: 2024-04-05
Payer: COMMERCIAL

## 2024-04-05 DIAGNOSIS — L98.7 EXCESS SKIN OF ABDOMEN: Primary | ICD-10-CM

## 2024-04-05 PROCEDURE — 99024 POSTOP FOLLOW-UP VISIT: CPT | Performed by: PLASTIC SURGERY

## 2024-04-05 NOTE — LETTER
4/5/2024         RE: Kesha David  [address] [facility] [address]        Dear Colleague,    Thank you for referring your patient, Kesha David, to the [facility]. Please see a copy of my visit note below.    PRESENTING COMPLAINT:  Post-operative visit s/p abdominoplasty and panniculectomy done 1/26/2024.     HISTORY OF PRESENTING COMPLAINT: The patient is here for post-operative visit.  The patient is being seen in the presence of my nurse.      Doing well.  No major issues.  Happy with results.     On exam: Vital signs stable afebrile.  Fully healed.     ASSESSMENT AND PLAN:  Based upon the above findings, the patient is here for post-operative visit.      Fredo healed.  No restrictions.  See us back as needed.    Patient is interested in a thigh lift.  Will send her quotes.  She will let me know if she wants to proceed.     All questions were answered.  The patient was happy with the visit.         Again, thank you for allowing me to participate in the care of your patient.        Sincerely,        TR Linares MD

## 2024-04-05 NOTE — NURSING NOTE
Kesha David's goals for this visit include:   Chief Complaint   Patient presents with    RECHECK     DOS 1/26 Abdominoplasty and Panniculectomy       She requests these members of her care team be copied on today's visit information:     PCP: Rosi Soriano    Referring Provider:  No referring provider defined for this encounter.    LMP 12/01/2016 (Approximate)     Do you need any medication refills at today's visit?     Anabel Veloz MA on 4/5/2024 at 1:10 PM

## 2024-04-05 NOTE — PROGRESS NOTES
PRESENTING COMPLAINT:  Post-operative visit s/p abdominoplasty and panniculectomy done 1/26/2024.     HISTORY OF PRESENTING COMPLAINT: The patient is here for post-operative visit.  The patient is being seen in the presence of my nurse.      Doing well.  No major issues.  Happy with results.     On exam: Vital signs stable afebrile.  Fully healed.     ASSESSMENT AND PLAN:  Based upon the above findings, the patient is here for post-operative visit.      Fredo healed.  No restrictions.  See us back as needed.    Patient is interested in a thigh lift.  Will send her quotes.  She will let me know if she wants to proceed.     All questions were answered.  The patient was happy with the visit.

## 2024-04-09 DIAGNOSIS — L98.7 EXCESS SKIN OF THIGH: Primary | ICD-10-CM

## 2024-04-16 ENCOUNTER — TELEPHONE (OUTPATIENT)
Dept: SURGERY | Facility: CLINIC | Age: 54
End: 2024-04-16
Payer: COMMERCIAL

## 2024-04-16 ENCOUNTER — HOSPITAL ENCOUNTER (OUTPATIENT)
Facility: AMBULATORY SURGERY CENTER | Age: 54
End: 2024-04-16
Attending: PLASTIC SURGERY | Admitting: PLASTIC SURGERY
Payer: COMMERCIAL

## 2024-04-16 PROBLEM — L98.7 EXCESS SKIN OF THIGH: Status: ACTIVE | Noted: 2024-04-09

## 2024-04-16 NOTE — TELEPHONE ENCOUNTER
Called and spoke with patient regarding scheduling surgery with Dr. Linares.    Surgery Date: 7/19/2024    Location: [facility]    H&P: patient verbally confirmed they will schedule with MILIND Leo at [facility] within 30 days prior to surgery.    Post-op: 8/2/2024, 4:00 PM in [place] with Dr. Linares.    Surgery Packet: sent via [employer]    Writer informed patient, pre-op nursing team will call 2-3 days prior to surgery with specific arrival and surgery times.    Patient had no further questions at this time.    Isaac Anne on 4/16/2024 at 10:40 AM

## 2024-04-23 ENCOUNTER — OFFICE VISIT (OUTPATIENT)
Dept: FAMILY MEDICINE | Facility: OTHER | Age: 54
End: 2024-04-23
Payer: COMMERCIAL

## 2024-04-23 VITALS
HEART RATE: 109 BPM | HEIGHT: 67 IN | DIASTOLIC BLOOD PRESSURE: 88 MMHG | SYSTOLIC BLOOD PRESSURE: 136 MMHG | WEIGHT: 193.5 LBS | RESPIRATION RATE: 18 BRPM | BODY MASS INDEX: 30.37 KG/M2 | TEMPERATURE: 97.5 F | OXYGEN SATURATION: 98 %

## 2024-04-23 DIAGNOSIS — F41.1 GAD (GENERALIZED ANXIETY DISORDER): Primary | ICD-10-CM

## 2024-04-23 DIAGNOSIS — F33.1 MAJOR DEPRESSIVE DISORDER, RECURRENT EPISODE, MODERATE (H): ICD-10-CM

## 2024-04-23 PROCEDURE — 99213 OFFICE O/P EST LOW 20 MIN: CPT | Performed by: PHYSICIAN ASSISTANT

## 2024-04-23 ASSESSMENT — PAIN SCALES - GENERAL: PAINLEVEL: NO PAIN (0)

## 2024-04-23 NOTE — PROGRESS NOTES
"  Assessment & Plan     HONEY (generalized anxiety disorder)  Major depressive disorder, recurrent episode, moderate (H)  We discussed potential cost of testing. We reviewed the purpose of genetic testing and the way in which we interpret the results and how they can help make adjustments to medication management. We did discuss limitations of the testing including that it will not help determine which medications she will have proper response to and there is still risks of adverse reactions with medications.   She signed the consent form, sample was obtained and information verified and sample was sent to [facility].   Will contact once results are back and send results to PCP to review and discuss.     Options for treatment and follow-up care were reviewed with the patient and/or guardian. Patient and/or guardian engaged in the decision making process and verbalized understanding of the options discussed and agreed with the final plan.      Timi Rebolledo is a 54 year old, presenting for the following health issues:  [facility]     History of Present Illness       Reason for visit:  Gene testing    She eats 2-3 servings of fruits and vegetables daily.She consumes 0 sweetened beverage(s) daily.She exercises with enough effort to increase her heart rate 9 or less minutes per day.  She exercises with enough effort to increase her heart rate 3 or less days per week. She is missing 1 dose(s) of medications per week.  She is not taking prescribed medications regularly due to remembering to take.     - Has been several medications in the past  - Right now the regimens seems to be working well but she would like to see if she can simplify her medication regimen.       Review of Systems  Constitutional and psych systems are negative, except as otherwise noted.      Objective    /88   Pulse 109   Temp 97.5  F (36.4  C) (Temporal)   Resp 18   Ht 1.705 m (5' 7.13\")   Wt 87.8 kg (193 lb 8 oz)   LMP 12/01/2016 " (Approximate)   SpO2 98%   BMI 30.19 kg/m    Body mass index is 30.19 kg/m .  Physical Exam   GENERAL: alert and no distress  MS: no gross musculoskeletal defects noted, no edema  PSYCH: mentation appears normal, affect normal/bright            Signed Electronically by: Razia Read PA-C

## 2024-04-25 ENCOUNTER — TRANSFERRED RECORDS (OUTPATIENT)
Dept: HEALTH INFORMATION MANAGEMENT | Facility: CLINIC | Age: 54
End: 2024-04-25
Payer: COMMERCIAL

## 2024-04-30 ENCOUNTER — TELEPHONE (OUTPATIENT)
Dept: FAMILY MEDICINE | Facility: OTHER | Age: 54
End: 2024-04-30
Payer: COMMERCIAL

## 2024-04-30 NOTE — TELEPHONE ENCOUNTER
[facility] results are available, will have team fax to JEANE Soriano PA-C the results to follow-up with patient and also have them send a copy to scanning.     Razia Read PA-C

## 2024-05-13 ENCOUNTER — PATIENT OUTREACH (OUTPATIENT)
Dept: CARE COORDINATION | Facility: CLINIC | Age: 54
End: 2024-05-13
Payer: COMMERCIAL

## 2024-05-13 SDOH — HEALTH STABILITY: PHYSICAL HEALTH: ON AVERAGE, HOW MANY DAYS PER WEEK DO YOU ENGAGE IN MODERATE TO STRENUOUS EXERCISE (LIKE A BRISK WALK)?: 0 DAYS

## 2024-05-13 SDOH — HEALTH STABILITY: PHYSICAL HEALTH: ON AVERAGE, HOW MANY MINUTES DO YOU ENGAGE IN EXERCISE AT THIS LEVEL?: 0 MIN

## 2024-05-13 ASSESSMENT — ASTHMA QUESTIONNAIRES
ACT_TOTALSCORE: 25
QUESTION_3 LAST FOUR WEEKS HOW OFTEN DID YOUR ASTHMA SYMPTOMS (WHEEZING, COUGHING, SHORTNESS OF BREATH, CHEST TIGHTNESS OR PAIN) WAKE YOU UP AT NIGHT OR EARLIER THAN USUAL IN THE MORNING: NOT AT ALL
ACT_TOTALSCORE: 25
QUESTION_5 LAST FOUR WEEKS HOW WOULD YOU RATE YOUR ASTHMA CONTROL: COMPLETELY CONTROLLED
QUESTION_4 LAST FOUR WEEKS HOW OFTEN HAVE YOU USED YOUR RESCUE INHALER OR NEBULIZER MEDICATION (SUCH AS ALBUTEROL): NOT AT ALL
QUESTION_1 LAST FOUR WEEKS HOW MUCH OF THE TIME DID YOUR ASTHMA KEEP YOU FROM GETTING AS MUCH DONE AT WORK, SCHOOL OR AT HOME: NONE OF THE TIME
QUESTION_2 LAST FOUR WEEKS HOW OFTEN HAVE YOU HAD SHORTNESS OF BREATH: NOT AT ALL

## 2024-05-13 ASSESSMENT — PATIENT HEALTH QUESTIONNAIRE - PHQ9
SUM OF ALL RESPONSES TO PHQ QUESTIONS 1-9: 3
SUM OF ALL RESPONSES TO PHQ QUESTIONS 1-9: 3
10. IF YOU CHECKED OFF ANY PROBLEMS, HOW DIFFICULT HAVE THESE PROBLEMS MADE IT FOR YOU TO DO YOUR WORK, TAKE CARE OF THINGS AT HOME, OR GET ALONG WITH OTHER PEOPLE: SOMEWHAT DIFFICULT

## 2024-05-13 ASSESSMENT — SOCIAL DETERMINANTS OF HEALTH (SDOH): HOW OFTEN DO YOU GET TOGETHER WITH FRIENDS OR RELATIVES?: ONCE A WEEK

## 2024-05-14 ENCOUNTER — OFFICE VISIT (OUTPATIENT)
Dept: FAMILY MEDICINE | Facility: CLINIC | Age: 54
End: 2024-05-14
Payer: COMMERCIAL

## 2024-05-14 VITALS
BODY MASS INDEX: 30.31 KG/M2 | TEMPERATURE: 97.5 F | RESPIRATION RATE: 18 BRPM | HEART RATE: 94 BPM | DIASTOLIC BLOOD PRESSURE: 84 MMHG | HEIGHT: 67 IN | SYSTOLIC BLOOD PRESSURE: 132 MMHG | WEIGHT: 193.13 LBS | OXYGEN SATURATION: 98 %

## 2024-05-14 DIAGNOSIS — B07.0 PLANTAR WARTS: ICD-10-CM

## 2024-05-14 DIAGNOSIS — L73.2 HIDRADENITIS SUPPURATIVA: ICD-10-CM

## 2024-05-14 DIAGNOSIS — Z13.220 LIPID SCREENING: ICD-10-CM

## 2024-05-14 DIAGNOSIS — Z13.1 SCREENING FOR DIABETES MELLITUS: ICD-10-CM

## 2024-05-14 DIAGNOSIS — R10.12 LUQ ABDOMINAL PAIN: ICD-10-CM

## 2024-05-14 DIAGNOSIS — F33.1 MAJOR DEPRESSIVE DISORDER, RECURRENT EPISODE, MODERATE (H): ICD-10-CM

## 2024-05-14 DIAGNOSIS — J45.30 MILD PERSISTENT ASTHMA WITHOUT COMPLICATION: ICD-10-CM

## 2024-05-14 DIAGNOSIS — Z23 NEED FOR HEPATITIS B BOOSTER VACCINATION: ICD-10-CM

## 2024-05-14 DIAGNOSIS — N95.2 ATROPHIC VAGINITIS: ICD-10-CM

## 2024-05-14 DIAGNOSIS — Z00.00 ROUTINE GENERAL MEDICAL EXAMINATION AT A HEALTH CARE FACILITY: Primary | ICD-10-CM

## 2024-05-14 DIAGNOSIS — G89.29 CHRONIC BILATERAL LOW BACK PAIN WITHOUT SCIATICA: ICD-10-CM

## 2024-05-14 DIAGNOSIS — M54.50 CHRONIC BILATERAL LOW BACK PAIN WITHOUT SCIATICA: ICD-10-CM

## 2024-05-14 PROBLEM — F33.0 MAJOR DEPRESSIVE DISORDER, RECURRENT EPISODE, MILD (H): Status: RESOLVED | Noted: 2023-02-13 | Resolved: 2024-05-14

## 2024-05-14 LAB
ALBUMIN SERPL BCG-MCNC: 4.2 G/DL (ref 3.5–5.2)
ALP SERPL-CCNC: 96 U/L (ref 40–150)
ALT SERPL W P-5'-P-CCNC: 11 U/L (ref 0–50)
ANION GAP SERPL CALCULATED.3IONS-SCNC: 8 MMOL/L (ref 7–15)
AST SERPL W P-5'-P-CCNC: 13 U/L (ref 0–45)
BILIRUB SERPL-MCNC: 0.4 MG/DL
BUN SERPL-MCNC: 14.9 MG/DL (ref 6–20)
CALCIUM SERPL-MCNC: 9.3 MG/DL (ref 8.6–10)
CHLORIDE SERPL-SCNC: 108 MMOL/L (ref 98–107)
CHOLEST SERPL-MCNC: 221 MG/DL
CREAT SERPL-MCNC: 0.91 MG/DL (ref 0.51–0.95)
DEPRECATED HCO3 PLAS-SCNC: 25 MMOL/L (ref 22–29)
EGFRCR SERPLBLD CKD-EPI 2021: 75 ML/MIN/1.73M2
FASTING STATUS PATIENT QL REPORTED: YES
FASTING STATUS PATIENT QL REPORTED: YES
GLUCOSE SERPL-MCNC: 98 MG/DL (ref 70–99)
HDLC SERPL-MCNC: 79 MG/DL
LDLC SERPL CALC-MCNC: 125 MG/DL
LIPASE SERPL-CCNC: 41 U/L (ref 13–60)
NONHDLC SERPL-MCNC: 142 MG/DL
POTASSIUM SERPL-SCNC: 3.6 MMOL/L (ref 3.4–5.3)
PROT SERPL-MCNC: 7.3 G/DL (ref 6.4–8.3)
SODIUM SERPL-SCNC: 141 MMOL/L (ref 135–145)
TRIGL SERPL-MCNC: 84 MG/DL

## 2024-05-14 PROCEDURE — 99396 PREV VISIT EST AGE 40-64: CPT | Mod: 25 | Performed by: PHYSICIAN ASSISTANT

## 2024-05-14 PROCEDURE — 80061 LIPID PANEL: CPT | Performed by: PHYSICIAN ASSISTANT

## 2024-05-14 PROCEDURE — 90746 HEPB VACCINE 3 DOSE ADULT IM: CPT | Performed by: PHYSICIAN ASSISTANT

## 2024-05-14 PROCEDURE — 99214 OFFICE O/P EST MOD 30 MIN: CPT | Mod: 25 | Performed by: PHYSICIAN ASSISTANT

## 2024-05-14 PROCEDURE — 36415 COLL VENOUS BLD VENIPUNCTURE: CPT | Performed by: PHYSICIAN ASSISTANT

## 2024-05-14 PROCEDURE — 80053 COMPREHEN METABOLIC PANEL: CPT | Performed by: PHYSICIAN ASSISTANT

## 2024-05-14 PROCEDURE — 90471 IMMUNIZATION ADMIN: CPT | Performed by: PHYSICIAN ASSISTANT

## 2024-05-14 PROCEDURE — 17110 DESTRUCTION B9 LES UP TO 14: CPT | Performed by: PHYSICIAN ASSISTANT

## 2024-05-14 PROCEDURE — 83690 ASSAY OF LIPASE: CPT | Performed by: PHYSICIAN ASSISTANT

## 2024-05-14 RX ORDER — DOXYCYCLINE 100 MG/1
100 CAPSULE ORAL 2 TIMES DAILY
Qty: 60 CAPSULE | Refills: 1 | Status: SHIPPED | OUTPATIENT
Start: 2024-05-14 | End: 2024-08-02

## 2024-05-14 RX ORDER — ESTRADIOL 0.1 MG/G
2 CREAM VAGINAL
Qty: 42.5 G | Refills: 4 | Status: SHIPPED | OUTPATIENT
Start: 2024-05-16

## 2024-05-14 RX ORDER — FLUTICASONE PROPIONATE AND SALMETEROL 250; 50 UG/1; UG/1
1 POWDER RESPIRATORY (INHALATION) EVERY 12 HOURS
Qty: 60 EACH | Refills: 3 | Status: SHIPPED | OUTPATIENT
Start: 2024-05-14

## 2024-05-14 ASSESSMENT — PAIN SCALES - GENERAL: PAINLEVEL: NO PAIN (0)
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
## 2024-06-03 DIAGNOSIS — L98.7 EXCESSIVE AND REDUNDANT SKIN AND SUBCUTANEOUS TISSUE: ICD-10-CM

## 2024-06-03 RX ORDER — ONDANSETRON 4 MG/1
4 TABLET, ORALLY DISINTEGRATING ORAL EVERY 8 HOURS PRN
Qty: 12 TABLET | Refills: 0 | Status: SHIPPED | OUTPATIENT
Start: 2024-06-03 | End: 2024-07-08

## 2024-06-17 ENCOUNTER — TELEPHONE (OUTPATIENT)
Dept: PLASTIC SURGERY | Facility: CLINIC | Age: 54
End: 2024-06-17
Payer: COMMERCIAL

## 2024-06-17 NOTE — TELEPHONE ENCOUNTER
Other: pt calling in regards to prior authorization, would like for care team to call back who handles the prior auths code [id number] excessive skin from thigh surgery. DOS is scheduled for 07/19.     Could we send this information to you in [employer] or would you prefer to receive a phone call?:   Patient would prefer a phone call   Okay to leave a detailed message?: Yes at Home number on file [phone] (home)

## 2024-06-19 NOTE — TELEPHONE ENCOUNTER
This writer spoke to patient today, in regards to her upcoming procedure (Thigh Lift-medial, bilateral) with Dr. Linares, which is currently scheduled for July 19th, 2024 [place].        Patient is calling today to inquire about the insurance coverage at this point in time.  She stated that she spoke to her insurance company, which has reviewed the above procedure will NOT be covered by insurance at this time.      Patient expressed to this nurse frustrations with the overall process of prior authorization.  She states she is frustrated as she isn't sure what information was sent from her healthcare team to the insurance company in order for the insurance company to make a decision to approve or deny procedure.      Encourage patient to call insurance company with specific questions.  Reviewed this nurse will connect with our Prior Authorization team to review patient's question about asking for an appeal.  Patient stated that she agrees with plan moving forward.        Will connect with PA team to discuss patient asking for appeal.    Reviewed if patient doesn't hear back by Friday, 6/21/24 to call team member to discuss.    Lyudmila Guillory RN on 6/19/2024 at 9:33 AM

## 2024-06-24 ENCOUNTER — MYC MEDICAL ADVICE (OUTPATIENT)
Dept: SURGERY | Facility: CLINIC | Age: 54
End: 2024-06-24
Payer: COMMERCIAL

## 2024-06-24 NOTE — TELEPHONE ENCOUNTER
This writer spoke to patient today.  Patient is requesting to appeal her insurance.  This nurse reviewed that we will have to review this with Dr. Linares.  Patient verbalized understanding.  She expressed frustration with the PA process, and stated when she spoke with her insurance company they specifically told her she could appeal for excessive skin from thigh surgery.      Reviewed that Dr. Linares is out of office for 2 weeks.  Will update Dr. Linares with patient request of wanting to pursue an appeal.  Reviewed that we will likely need to postpone her surgery date due to the appeal process.  Patient stated she is okay with this.    Will update Surgery Scheduler and Prior Authorization team.  Message also sent to Dr. Linares to review upon his return to office.     Lyudmila Guillory RN on 6/24/2024 at 4:52 PM

## 2024-06-24 NOTE — TELEPHONE ENCOUNTER
Called pt to discuss surgery.  Per PA team, we will not have an answer from insurance by her surgery date.     Essentially, her options are to postpone and obtain documents or keep surgery date and pay the cosmetic quote she received.     No answer, left generic voicemail for pt to return call to clinic.    [facility] message sent dated 6/24.  Annika Guzman RN

## 2024-06-25 NOTE — TELEPHONE ENCOUNTER
Spoke with patient    Rescheduled surgery per RN discussion with patient regarding insurance denial and timeline for appeal, as well as surgery currently being less than 30 days away    Offered first available date of 8/30, patient declined stating this is too close to labor day, and requested next available, which is 9/27    Surgery is rescheduled with Dr. Linares   Date: 9/27   Location: [facility]    H&P to be completed by: Primary Care team - patient instructed to schedule   per patient, this will be scheduled with Rosi Soriano PA-C        Surgical consult: scheduled on 8/2 with Dr. Linares    Post-op: rescheduled to 10/8 with Syeda Fragoso PA-C    Patient will receive a phone call from surgery center pre-operative nurses 3-5 days prior to surgery with arrival time and NPO instructions.     Patient questions/concerns: N/A     __    Nell Riki on 6/25/2024 at 11:14 AM  P: [phone]

## 2024-06-26 DIAGNOSIS — E65 ABDOMINAL PANNUS: ICD-10-CM

## 2024-06-26 DIAGNOSIS — F17.200 TOBACCO USE DISORDER: ICD-10-CM

## 2024-06-26 DIAGNOSIS — L73.2 HIDRADENITIS SUPPURATIVA: ICD-10-CM

## 2024-06-26 DIAGNOSIS — Z98.84 S/P GASTRIC BYPASS: ICD-10-CM

## 2024-06-26 RX ORDER — PHENTERMINE HYDROCHLORIDE 37.5 MG/1
1 TABLET ORAL
Qty: 30 TABLET | Refills: 2 | Status: SHIPPED | OUTPATIENT
Start: 2024-06-26 | End: 2024-09-28

## 2024-06-26 RX ORDER — VARENICLINE TARTRATE 1 MG/1
1 TABLET, FILM COATED ORAL 2 TIMES DAILY
Qty: 60 TABLET | Refills: 2 | Status: SHIPPED | OUTPATIENT
Start: 2024-06-26

## 2024-07-08 DIAGNOSIS — L98.7 EXCESSIVE AND REDUNDANT SKIN AND SUBCUTANEOUS TISSUE: ICD-10-CM

## 2024-07-08 RX ORDER — ONDANSETRON 4 MG/1
4 TABLET, ORALLY DISINTEGRATING ORAL EVERY 8 HOURS PRN
Qty: 12 TABLET | Refills: 3 | Status: SHIPPED | OUTPATIENT
Start: 2024-07-08

## 2024-07-17 ENCOUNTER — HOSPITAL ENCOUNTER (OUTPATIENT)
Dept: GENERAL RADIOLOGY | Facility: CLINIC | Age: 54
Discharge: HOME | End: 2024-07-17
Attending: PHYSICIAN ASSISTANT
Payer: COMMERCIAL

## 2024-07-17 ENCOUNTER — HOSPITAL ENCOUNTER (OUTPATIENT)
Dept: MRI IMAGING | Facility: CLINIC | Age: 54
Discharge: HOME | End: 2024-07-17
Attending: PHYSICIAN ASSISTANT
Payer: COMMERCIAL

## 2024-07-17 DIAGNOSIS — M54.50 LUMBAR BACK PAIN: Primary | ICD-10-CM

## 2024-07-17 DIAGNOSIS — G89.29 CHRONIC RIGHT SHOULDER PAIN: ICD-10-CM

## 2024-07-17 DIAGNOSIS — M25.511 CHRONIC RIGHT SHOULDER PAIN: ICD-10-CM

## 2024-07-17 DIAGNOSIS — M54.50 LUMBAR BACK PAIN: ICD-10-CM

## 2024-07-17 PROCEDURE — 73221 MRI JOINT UPR EXTREM W/O DYE: CPT | Mod: 26 | Performed by: RADIOLOGY

## 2024-07-17 PROCEDURE — 73221 MRI JOINT UPR EXTREM W/O DYE: CPT | Mod: RT

## 2024-07-17 PROCEDURE — 72100 X-RAY EXAM L-S SPINE 2/3 VWS: CPT

## 2024-07-29 ENCOUNTER — OFFICE VISIT (OUTPATIENT)
Dept: ORTHOPEDICS | Facility: CLINIC | Age: 54
End: 2024-07-29
Payer: COMMERCIAL

## 2024-07-29 ENCOUNTER — ANCILLARY PROCEDURE (OUTPATIENT)
Dept: GENERAL RADIOLOGY | Facility: CLINIC | Age: 54
End: 2024-07-29
Attending: PHYSICIAN ASSISTANT
Payer: COMMERCIAL

## 2024-07-29 VITALS
DIASTOLIC BLOOD PRESSURE: 82 MMHG | BODY MASS INDEX: 32.32 KG/M2 | TEMPERATURE: 98.2 F | SYSTOLIC BLOOD PRESSURE: 122 MMHG | HEIGHT: 66 IN | WEIGHT: 201.1 LBS

## 2024-07-29 DIAGNOSIS — M75.111 NONTRAUMATIC INCOMPLETE TEAR OF RIGHT ROTATOR CUFF: ICD-10-CM

## 2024-07-29 DIAGNOSIS — M75.21 TENDINITIS OF UPPER BICEPS TENDON OF RIGHT SHOULDER: ICD-10-CM

## 2024-07-29 DIAGNOSIS — M25.511 RIGHT SHOULDER PAIN: ICD-10-CM

## 2024-07-29 DIAGNOSIS — M75.81 RIGHT ROTATOR CUFF TENDINITIS: Primary | ICD-10-CM

## 2024-07-29 PROCEDURE — 73030 X-RAY EXAM OF SHOULDER: CPT | Mod: TC | Performed by: RADIOLOGY

## 2024-07-29 PROCEDURE — 99203 OFFICE O/P NEW LOW 30 MIN: CPT | Performed by: PHYSICIAN ASSISTANT

## 2024-07-29 ASSESSMENT — PAIN SCALES - GENERAL: PAINLEVEL: MILD PAIN (2)
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
## 2024-07-31 DIAGNOSIS — L73.2 HIDRADENITIS SUPPURATIVA: ICD-10-CM

## 2024-08-01 ENCOUNTER — HOSPITAL ENCOUNTER (OUTPATIENT)
Dept: MRI IMAGING | Facility: CLINIC | Age: 54
Discharge: HOME | End: 2024-08-01
Attending: PHYSICIAN ASSISTANT | Admitting: PHYSICIAN ASSISTANT
Payer: COMMERCIAL

## 2024-08-01 DIAGNOSIS — M54.50 LUMBAR BACK PAIN: ICD-10-CM

## 2024-08-01 DIAGNOSIS — R93.7 ABNORMAL X-RAY OF LUMBAR SPINE: ICD-10-CM

## 2024-08-01 PROCEDURE — 72148 MRI LUMBAR SPINE W/O DYE: CPT

## 2024-08-02 RX ORDER — DOXYCYCLINE 100 MG/1
100 CAPSULE ORAL 2 TIMES DAILY
Qty: 60 CAPSULE | Refills: 1 | Status: SHIPPED | OUTPATIENT
Start: 2024-08-02 | End: 2024-09-23

## 2024-08-04 DIAGNOSIS — S32.020D COMPRESSION FRACTURE OF L2 VERTEBRA WITH ROUTINE HEALING, SUBSEQUENT ENCOUNTER: Primary | ICD-10-CM

## 2024-08-06 NOTE — TELEPHONE ENCOUNTER
REASON FOR VISIT: Compression fracture of L2 vertebra with routine healing    DATE OF APPT: 8/27/2024   NOTES (FOR ALL VISITS) STATUS DETAILS   OFFICE NOTE from referring provider Internal [facility]  Rosi Soriano PA-C   MEDICATION LIST Internal    IMAGING  (FOR ALL VISITS)     XR Internal [facility]  XR Lumbar spine 7/17/2024   MRI (HEAD, NECK, SPINE) Internal [facility]  MR Lumbar spine 8/01/2024   CT (HEAD, NECK, SPINE) N/A

## 2024-08-15 ENCOUNTER — MYC REFILL (OUTPATIENT)
Dept: FAMILY MEDICINE | Facility: CLINIC | Age: 54
End: 2024-08-15
Payer: COMMERCIAL

## 2024-08-15 ENCOUNTER — MYC MEDICAL ADVICE (OUTPATIENT)
Dept: FAMILY MEDICINE | Facility: CLINIC | Age: 54
End: 2024-08-15
Payer: COMMERCIAL

## 2024-08-15 DIAGNOSIS — M54.50 CHRONIC BILATERAL LOW BACK PAIN WITHOUT SCIATICA: ICD-10-CM

## 2024-08-15 DIAGNOSIS — G89.29 CHRONIC BILATERAL LOW BACK PAIN WITHOUT SCIATICA: ICD-10-CM

## 2024-08-16 NOTE — TELEPHONE ENCOUNTER
Staff will need to reach out to [facility] in Surgery Scheduling, phone [phone] and see if this has been received and is being addressed.    Lyudmila Mendoza XRO/

## 2024-08-16 NOTE — TELEPHONE ENCOUNTER
I sent a paper referral to Dr. Hernandez - not sure how we can check on this status.     Rosi Soriano PA-C

## 2024-08-23 DIAGNOSIS — L73.2 HIDRADENITIS SUPPURATIVA: ICD-10-CM

## 2024-08-23 DIAGNOSIS — E65 ABDOMINAL PANNUS: ICD-10-CM

## 2024-08-23 DIAGNOSIS — Z98.84 S/P GASTRIC BYPASS: ICD-10-CM

## 2024-08-23 RX ORDER — TOPIRAMATE 25 MG/1
25 TABLET, FILM COATED ORAL DAILY
Qty: 90 TABLET | Refills: 1 | Status: SHIPPED | OUTPATIENT
Start: 2024-08-23

## 2024-08-27 ENCOUNTER — PRE VISIT (OUTPATIENT)
Dept: NEUROSURGERY | Facility: CLINIC | Age: 54
End: 2024-08-27

## 2024-09-06 ENCOUNTER — TELEPHONE (OUTPATIENT)
Dept: PLASTIC SURGERY | Facility: CLINIC | Age: 54
End: 2024-09-06
Payer: COMMERCIAL

## 2024-09-06 NOTE — TELEPHONE ENCOUNTER
Received message from [facility] auth team stating that patient requested to cancel surgery due to insurance denial and inability to pay cosmetic cost.    Spoke with patient and verified she would like to cancel surgery at this time.    Cancelled surgery and post-op appointment.    Instructed patient to call clinic if she has future questions.    Nell Lyn on 9/6/2024 at 1:37 PM

## 2024-09-10 ENCOUNTER — OFFICE VISIT (OUTPATIENT)
Dept: FAMILY MEDICINE | Facility: CLINIC | Age: 54
End: 2024-09-10
Payer: COMMERCIAL

## 2024-09-10 VITALS
HEIGHT: 66 IN | DIASTOLIC BLOOD PRESSURE: 84 MMHG | BODY MASS INDEX: 32.14 KG/M2 | RESPIRATION RATE: 16 BRPM | HEART RATE: 78 BPM | OXYGEN SATURATION: 98 % | TEMPERATURE: 97.6 F | SYSTOLIC BLOOD PRESSURE: 118 MMHG | WEIGHT: 200 LBS

## 2024-09-10 DIAGNOSIS — M54.50 CHRONIC BILATERAL LOW BACK PAIN WITHOUT SCIATICA: ICD-10-CM

## 2024-09-10 DIAGNOSIS — G89.29 CHRONIC BILATERAL LOW BACK PAIN WITHOUT SCIATICA: ICD-10-CM

## 2024-09-10 DIAGNOSIS — Z01.818 PREOP GENERAL PHYSICAL EXAM: Primary | ICD-10-CM

## 2024-09-10 LAB
HGB BLD-MCNC: 12.3 G/DL (ref 11.7–15.7)
POTASSIUM SERPL-SCNC: 4.4 MMOL/L (ref 3.4–5.3)

## 2024-09-10 PROCEDURE — 99213 OFFICE O/P EST LOW 20 MIN: CPT

## 2024-09-10 PROCEDURE — 84132 ASSAY OF SERUM POTASSIUM: CPT

## 2024-09-10 PROCEDURE — 36415 COLL VENOUS BLD VENIPUNCTURE: CPT

## 2024-09-10 PROCEDURE — G2211 COMPLEX E/M VISIT ADD ON: HCPCS

## 2024-09-10 PROCEDURE — 85018 HEMOGLOBIN: CPT

## 2024-09-10 ASSESSMENT — PAIN SCALES - GENERAL: PAINLEVEL: MILD PAIN (2)

## 2024-09-10 NOTE — PATIENT INSTRUCTIONS
Stop taking phentermine 7 days prior to the procedure    You May take the rest of your medications as prescribed the day of the procedure    Preop completed today     Lab work if needed    EKG if needed     Contact your surgical team with any questions or concerns     Do not eat after midnight the night prior to procedure.  You may take a small sip of water in the morning if needed.     Follow-up if you become ill or have a change in health status between now and your procedure     Follow-up with any questions or concerns     Follow-up with any new, worsening, or persistent symptoms     Go to the ER or call 911 in the case of an emergency     Instructed the patient to cancel surgery and reschedule if develops high fever or is vomiting 1-3 days before surgery. Preoperative History and Physical is good for 30 days. May need additional blood work--contact primary provider.    Other Pre-Op Orders for when to stop eating the night before surgery, time of surgery, where & when to check in, parking, and any other specific pre-operative orders come from the surgeon's office. You should hear from them at least one day before surgery if not sooner for these specific instructions.    STOP any types of over the counter blood thinning medications (such as aspirin, Motrin/ibuprofen, Aleve/naproxen, vitamin E, or fish oil) 1 week prior to surgery. Tylenol (or acetaminophen) is okay to take for pain if needed   Recommend no alcohol consumption at least 48 hours prior to surgery        Pre-operative (pre-op) instructions are crucial for ensuring a safe and successful surgical procedure. These instructions help prepare the patient physically and mentally and minimize the risk of complications. Here is a comprehensive guide to common pre-op instructions:    ### 1. **Medical and Health Assessments**    - **Pre-Op Evaluation**: Attend any scheduled pre-operative appointments for physical exams and lab tests (blood work, ECG, chest  X-ray, etc.).  - **Medical History**: Provide a complete medical history, including any chronic conditions, previous surgeries, and current medications.  - **Medication Review**: Inform your doctor of all medications, supplements, and over-the-counter drugs you are taking.    ### 2. **Medication Instructions**    - **Stop Certain Medications**: You may need to stop taking blood thinners (e.g., aspirin, warfarin, clopidogrel), NSAIDs (e.g., ibuprofen), and certain supplements (e.g., vitamin E, fish oil) several days before surgery to reduce the risk of bleeding.  - **Continue Essential Medications**: Take essential medications (e.g., for blood pressure, heart conditions) as instructed. Your doctor will advise which medications you can continue and which you should stop.  - **Special Instructions**: For diabetes, adjust insulin or oral hypoglycemic medications as instructed by your healthcare provider.    ### 3. **Dietary Restrictions**    - **Fasting**: Do not eat or drink anything after midnight the night before the surgery. This includes water, gum, and candy. Follow specific fasting instructions provided by your surgeon or anesthesiologist.  - **Clear Liquids**: Some procedures may allow clear liquids up to a few hours before surgery. Confirm with your healthcare provider.    ### 4. **Day of Surgery Preparation**    - **Shower**: Take a shower using antibacterial soap the night before and the morning of surgery to reduce the risk of infection.  - **No Cosmetics or Jewelry**: Do not wear makeup, nail polish, lotions, perfumes, or jewelry on the day of surgery.  - **Comfortable Clothing**: Wear loose, comfortable clothing that is easy to remove. Bring a change of clothes if needed.    ### 5. **Transportation and Support**    - **Arrange Transportation**: Ensure you have someone to drive you to and from the hospital or surgical center, as you will not be able to drive yourself.  - **Support Person**: Have a friend or  family member available to assist you after the procedure, especially if you are having outpatient surgery.    ### 6. **Special Instructions**    - **Pre-Op Cleansing**: Follow any special instructions for skin preparation, such as using specific cleansing wipes provided by your surgical team.  - **Bowel Preparation**: For certain abdominal or colorectal surgeries, you may need to follow a bowel preparation regimen as instructed.    ### 7. **Administrative Preparations**    - **Insurance and Paperwork**: Bring your insurance information, ID, and any required paperwork.  - **Consent Forms**: Sign all necessary consent forms prior to the procedure.    ### 8. **Personal Health Considerations**    - **Report Illness**: Inform your surgeon if you develop any signs of illness (e.g., fever, cold, infection) before the surgery.  - **Smoking and Alcohol**: Stop smoking and limit alcohol intake several weeks before surgery to improve healing and recovery.    ### 9. **Mental and Emotional Preparation**    - **Understand the Procedure**: Educate yourself about the surgery, including the procedure, risks, benefits, and expected recovery.  - **Ask Questions**: Discuss any concerns or questions with your surgeon or healthcare team to feel more comfortable and prepared.    ### Summary    Pre-operative instructions are tailored to each individual and the specific type of surgery. Following these guidelines closely helps ensure a smooth surgical experience and reduces the risk of complications. Always follow the specific instructions provided by your surgical team, as they may have additional or different requirements based on your personal health and the procedure you are undergoing.    Patient understood and verbally consented to the treatment plan. Discussed symptoms that would warrant an urgent or emergent visit. All of the patients' questions were answered. Patient was instructed to contact the clinic if questions or concerns  arise. Recommend follow up appointments if symptoms worsen or fail to improve. Recommend follow up as needed. Recommend ER in the case of an emergency.    Nakia Do PA-C    Please note: Voice recognition software may have been used in preparing this note, unintended word substitutions may be present.
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
## 2024-09-19 ENCOUNTER — ANESTHESIA EVENT (OUTPATIENT)
Dept: SURGERY | Facility: CLINIC | Age: 54
End: 2024-09-19
Payer: COMMERCIAL

## 2024-09-19 ASSESSMENT — ENCOUNTER SYMPTOMS: SEIZURES: 1

## 2024-09-19 ASSESSMENT — LIFESTYLE VARIABLES: TOBACCO_USE: 1

## 2024-09-20 ENCOUNTER — APPOINTMENT (OUTPATIENT)
Dept: GENERAL RADIOLOGY | Facility: CLINIC | Age: 54
End: 2024-09-20
Attending: ANESTHESIOLOGY
Payer: COMMERCIAL

## 2024-09-20 ENCOUNTER — HOSPITAL ENCOUNTER (OUTPATIENT)
Facility: CLINIC | Age: 54
Discharge: HOME | End: 2024-09-20
Attending: ANESTHESIOLOGY | Admitting: ANESTHESIOLOGY
Payer: COMMERCIAL

## 2024-09-20 ENCOUNTER — ANESTHESIA (OUTPATIENT)
Dept: SURGERY | Facility: CLINIC | Age: 54
End: 2024-09-20
Payer: COMMERCIAL

## 2024-09-20 VITALS
SYSTOLIC BLOOD PRESSURE: 120 MMHG | DIASTOLIC BLOOD PRESSURE: 85 MMHG | TEMPERATURE: 97.9 F | RESPIRATION RATE: 15 BRPM | OXYGEN SATURATION: 98 % | HEART RATE: 85 BPM

## 2024-09-20 DIAGNOSIS — S32.020A: ICD-10-CM

## 2024-09-20 PROCEDURE — 250N000011 HC RX IP 250 OP 636: Performed by: NURSE ANESTHETIST, CERTIFIED REGISTERED

## 2024-09-20 PROCEDURE — 250N000011 HC RX IP 250 OP 636: Performed by: ANESTHESIOLOGY

## 2024-09-20 PROCEDURE — 370N000017 HC ANESTHESIA TECHNICAL FEE, PER MIN: Performed by: ANESTHESIOLOGY

## 2024-09-20 PROCEDURE — 250N000009 HC RX 250: Performed by: ANESTHESIOLOGY

## 2024-09-20 PROCEDURE — 22514 PERQ VERTEBRAL AUGMENTATION: CPT | Performed by: ANESTHESIOLOGY

## 2024-09-20 PROCEDURE — 77003 FLUOROGUIDE FOR SPINE INJECT: CPT | Mod: TC

## 2024-09-20 PROCEDURE — C1776 JOINT DEVICE (IMPLANTABLE): HCPCS | Performed by: ANESTHESIOLOGY

## 2024-09-20 PROCEDURE — 250N000009 HC RX 250: Performed by: NURSE ANESTHETIST, CERTIFIED REGISTERED

## 2024-09-20 PROCEDURE — 999N000141 HC STATISTIC PRE-PROCEDURE NURSING ASSESSMENT: Performed by: ANESTHESIOLOGY

## 2024-09-20 PROCEDURE — 710N000012 HC RECOVERY PHASE 2, PER MINUTE: Performed by: ANESTHESIOLOGY

## 2024-09-20 PROCEDURE — 272N000001 HC OR GENERAL SUPPLY STERILE: Performed by: ANESTHESIOLOGY

## 2024-09-20 PROCEDURE — 360N000084 HC SURGERY LEVEL 4 W/ FLUORO, PER MIN: Performed by: ANESTHESIOLOGY

## 2024-09-20 RX ORDER — NALOXONE HYDROCHLORIDE 0.4 MG/ML
0.1 INJECTION, SOLUTION INTRAMUSCULAR; INTRAVENOUS; SUBCUTANEOUS
Status: DISCONTINUED | OUTPATIENT
Start: 2024-09-20 | End: 2024-09-20 | Stop reason: HOSPADM

## 2024-09-20 RX ORDER — IOPAMIDOL 612 MG/ML
INJECTION, SOLUTION INTRATHECAL PRN
Status: DISCONTINUED | OUTPATIENT
Start: 2024-09-20 | End: 2024-09-20 | Stop reason: HOSPADM

## 2024-09-20 RX ORDER — LIDOCAINE HYDROCHLORIDE 20 MG/ML
INJECTION, SOLUTION INFILTRATION; PERINEURAL PRN
Status: DISCONTINUED | OUTPATIENT
Start: 2024-09-20 | End: 2024-09-20

## 2024-09-20 RX ORDER — PROPOFOL 10 MG/ML
INJECTION, EMULSION INTRAVENOUS CONTINUOUS PRN
Status: DISCONTINUED | OUTPATIENT
Start: 2024-09-20 | End: 2024-09-20

## 2024-09-20 RX ORDER — PROPOFOL 10 MG/ML
INJECTION, EMULSION INTRAVENOUS PRN
Status: DISCONTINUED | OUTPATIENT
Start: 2024-09-20 | End: 2024-09-20

## 2024-09-20 RX ORDER — OXYCODONE HYDROCHLORIDE 5 MG/1
5 TABLET ORAL
Status: DISCONTINUED | OUTPATIENT
Start: 2024-09-20 | End: 2024-09-20 | Stop reason: HOSPADM

## 2024-09-20 RX ORDER — DIMENHYDRINATE 50 MG/ML
25 INJECTION, SOLUTION INTRAMUSCULAR; INTRAVENOUS
Status: DISCONTINUED | OUTPATIENT
Start: 2024-09-20 | End: 2024-09-20 | Stop reason: HOSPADM

## 2024-09-20 RX ORDER — LAMOTRIGINE 100 MG/1
100 TABLET ORAL DAILY
COMMUNITY
Start: 2024-03-01

## 2024-09-20 RX ORDER — ONDANSETRON 2 MG/ML
4 INJECTION INTRAMUSCULAR; INTRAVENOUS EVERY 30 MIN PRN
Status: DISCONTINUED | OUTPATIENT
Start: 2024-09-20 | End: 2024-09-20 | Stop reason: HOSPADM

## 2024-09-20 RX ORDER — FENTANYL CITRATE 50 UG/ML
INJECTION, SOLUTION INTRAMUSCULAR; INTRAVENOUS PRN
Status: DISCONTINUED | OUTPATIENT
Start: 2024-09-20 | End: 2024-09-20

## 2024-09-20 RX ORDER — BUPIVACAINE HYDROCHLORIDE 5 MG/ML
INJECTION, SOLUTION PERINEURAL PRN
Status: DISCONTINUED | OUTPATIENT
Start: 2024-09-20 | End: 2024-09-20 | Stop reason: HOSPADM

## 2024-09-20 RX ORDER — ONDANSETRON 4 MG/1
4 TABLET, ORALLY DISINTEGRATING ORAL EVERY 30 MIN PRN
Status: DISCONTINUED | OUTPATIENT
Start: 2024-09-20 | End: 2024-09-20 | Stop reason: HOSPADM

## 2024-09-20 RX ORDER — FENTANYL CITRATE 50 UG/ML
50 INJECTION, SOLUTION INTRAMUSCULAR; INTRAVENOUS
Status: DISCONTINUED | OUTPATIENT
Start: 2024-09-20 | End: 2024-09-20 | Stop reason: HOSPADM

## 2024-09-20 RX ORDER — OXYCODONE HYDROCHLORIDE 5 MG/1
10 TABLET ORAL
Status: DISCONTINUED | OUTPATIENT
Start: 2024-09-20 | End: 2024-09-20 | Stop reason: HOSPADM

## 2024-09-20 RX ADMIN — PROPOFOL 150 MCG/KG/MIN: 10 INJECTION, EMULSION INTRAVENOUS at 07:38

## 2024-09-20 RX ADMIN — LIDOCAINE HYDROCHLORIDE 50 MG: 20 INJECTION, SOLUTION INFILTRATION; PERINEURAL at 07:33

## 2024-09-20 RX ADMIN — FENTANYL CITRATE 50 MCG: 50 INJECTION INTRAMUSCULAR; INTRAVENOUS at 07:28

## 2024-09-20 RX ADMIN — PROPOFOL 50 MG: 10 INJECTION, EMULSION INTRAVENOUS at 07:36

## 2024-09-20 RX ADMIN — PROPOFOL 50 MG: 10 INJECTION, EMULSION INTRAVENOUS at 07:34

## 2024-09-20 RX ADMIN — MIDAZOLAM 1 MG: 1 INJECTION INTRAMUSCULAR; INTRAVENOUS at 07:28

## 2024-09-20 RX ADMIN — PROPOFOL 50 MG: 10 INJECTION, EMULSION INTRAVENOUS at 07:35

## 2024-09-20 ASSESSMENT — ACTIVITIES OF DAILY LIVING (ADL)
ADLS_ACUITY_SCORE: 35
ADLS_ACUITY_SCORE: 35

## 2024-09-20 NOTE — DISCHARGE INSTRUCTIONS
Home Care Instructions                Procedure: Kyphoplasty    Activity:  Rest today  Do not work today  Do not lift greater than 10 pounds for 2 weeks.    Pain:  You may experience soreness at the injection site for 1 to 3 days.  You may use an ice pack for 20 minutes every 2 hours for the first 24 hours  You may use a heating pad after the first 24 hours  You may use Tylenol  (acetaminophen) every 4 hours or other pain medicines as directed by your physician    Safety  Sedation medicine, if given may remain active for many hours.    It is important for the next 24 hours that you do not:  Drive a car  Operate machines or power tools  Consume alcohol, including beer  Sign any important papers or legal documents    You may experience numbness radiating into your legs or arms, (depending on the procedure location)  This numbness may last several hours.  Until the numb sensation returns to normal please use caution in walking, climbing stairs, stepping out of your vehicle, etc.        Please contact us if you have:  Severe pain   Fever more than 101.5 degrees Fahrenheit  Signs of infection (redness, swelling or drainage)       PLEASE GO TO THE NEAREST EMERGENCY ROOM IF YOU HAVE:   - Increasing numbness or weakness in your arms or legs or increasingly severe pain (greater than 4 hours after your injection)    If you have questions during normal business hours (8am-5pm Monday-Friday) contact the [facility] at [phone] to access the [facility] . If you need help after hours, we recommend that you go to a hospital emergency room or dial 911.

## 2024-09-20 NOTE — OP NOTE
Preoperative diagnosis: Osteoporotic compression fracture at L2    Postoperative diagnosis: Same as preoperative diagnosis    Blood loss: 0 cc    Anesthesia: Monitored anesthetic care    Procedure: L2 kyphoplasty    Technique: After written and verbal informed consent was obtained including risks of infection, bleeding, no help, worse pain, allergic reactions, spinal cord injury, she was brought to the procedure area placed in the prone position and sterilely prepped and draped in usual fashion using ChloraPrep.  1% lidocaine was used for skin anesthesia.  I then used a 22-gauge 3-1/2 inch Quincke needle and advanced down to the right L2 pedicle and anesthetized this area with a mixture of 5 cc of 1% lidocaine with 0.5% bupivacaine.  Then a 11-gauge Lisandro introducer needle was placed into the right L2 pedicle making sure I did not breach the medial border of the pedicle until into the vertebral body.  Once into the vertebral body the introducer needle was advanced into the posterior one third of the vertebral body.  The curved stylette needle was placed and tapped into place until it was cross the midline and into the posterior third of the vertebral body.  The sheath was left in place and then through the sheath the [place] kyphoplasty balloon was placed and then the sheath was retracted exposing the balloon.  The balloon was inflated to a maximum of 250 mmHg.  A total of 2.5 cc of contrast was injected into the balloon and then deflated.  We then mixed the methyl methacrylate cement for 30 seconds.  Through the injection sheath the cement was injected in the lateral image.  A total of 3 cc of methylmethacrylate cement was injected.  There was good spread bilaterally and up to the posterior one third of the vertebral body.  Once cement injection was ceased the sheath and injection apparatus was removed.  The stylette was placed into the introducer needle and in a counterclockwise fashion the introducer needle was  rotated making sure I did not have any needle cement tracks coming out of the pedicle to the skin.  The patient was then allowed to lay in the prone position for 10 minutes and a sterile bandage was placed over the needle insertion site.  She was brought to the cover room in stable condition.    Complications: None    Follow-up: She will follow-up with me on an as-needed basis.  Discharged home with cautions for infection and bleeding and told to seek medical attention as soon as possible if the symptoms were to develop.
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
## 2024-09-20 NOTE — ANESTHESIA CARE TRANSFER NOTE
Patient: Kesha David    Procedure: Procedure(s):  Kyphoplasty lumbar 2       Diagnosis: Compression fracture of L2 lumbar vertebra (H) [S32.020A]  Diagnosis Additional Information: No value filed.    Anesthesia Type:   MAC     Note:    Oropharynx: oropharynx clear of all foreign objects and spontaneously breathing  Level of Consciousness: drowsy  Oxygen Supplementation: room air    Independent Airway: airway patency satisfactory and stable  Dentition: dentition unchanged  Vital Signs Stable: post-procedure vital signs reviewed and stable  Report to RN Given: handoff report given  Patient transferred to: PACU    Handoff Report: Identifed the Patient, Identified the Reponsible Provider, Reviewed the pertinent medical history, Discussed the surgical course, Reviewed Intra-OP anesthesia mangement and issues during anesthesia, Set expectations for post-procedure period and Allowed opportunity for questions and acknowledgement of understanding  Vitals:  Vitals Value Taken Time   BP     Temp     Pulse     Resp     SpO2 91 % 09/20/24 0820   Vitals shown include unfiled device data.    Electronically Signed By: MARANDA Davies CRNA  September 20, 2024  8:21 AM

## 2024-09-20 NOTE — ANESTHESIA POSTPROCEDURE EVALUATION
Patient: Kesha David    Procedure: Procedure(s):  Kyphoplasty lumbar 2       Anesthesia Type:  MAC    Note:  Disposition: Outpatient   Postop Pain Control: Uneventful            Sign Out: Well controlled pain   PONV: No   Neuro/Psych: Uneventful            Sign Out: Acceptable/Baseline neuro status   Airway/Respiratory: Uneventful            Sign Out: Acceptable/Baseline resp. status   CV/Hemodynamics: Uneventful            Sign Out: Acceptable CV status   Other NRE: NONE   DID A NON-ROUTINE EVENT OCCUR? No    Event details/Postop Comments:  Pt was happy with anesthesia care.  No complications.  I will follow up with the pt if needed.       Last vitals:  Vitals Value Taken Time   /77 09/20/24 0840   Temp     Pulse 82 09/20/24 0840   Resp 15 09/20/24 0830   SpO2 99 % 09/20/24 0841   Vitals shown include unfiled device data.    Electronically Signed By: MARANDA Davies CRNA  September 20, 2024  8:51 AM

## 2024-09-22 DIAGNOSIS — F33.1 MAJOR DEPRESSIVE DISORDER, RECURRENT EPISODE, MODERATE (H): ICD-10-CM

## 2024-09-22 DIAGNOSIS — F10.10 ETOH ABUSE: ICD-10-CM

## 2024-09-22 DIAGNOSIS — L73.2 HIDRADENITIS SUPPURATIVA: ICD-10-CM

## 2024-09-23 RX ORDER — LAMOTRIGINE 25 MG/1
50 TABLET ORAL DAILY
Qty: 180 TABLET | Refills: 1 | Status: SHIPPED | OUTPATIENT
Start: 2024-09-23

## 2024-09-23 RX ORDER — DOXYCYCLINE 100 MG/1
100 CAPSULE ORAL 2 TIMES DAILY
Qty: 60 CAPSULE | Refills: 1 | Status: SHIPPED | OUTPATIENT
Start: 2024-09-23

## 2024-09-23 RX ORDER — NALTREXONE HYDROCHLORIDE 50 MG/1
50 TABLET, FILM COATED ORAL DAILY
Qty: 90 TABLET | Refills: 1 | Status: SHIPPED | OUTPATIENT
Start: 2024-09-23

## 2024-09-28 DIAGNOSIS — E65 ABDOMINAL PANNUS: ICD-10-CM

## 2024-09-28 DIAGNOSIS — Z98.84 S/P GASTRIC BYPASS: ICD-10-CM

## 2024-09-28 DIAGNOSIS — L73.2 HIDRADENITIS SUPPURATIVA: ICD-10-CM

## 2024-09-28 RX ORDER — PHENTERMINE HYDROCHLORIDE 37.5 MG/1
1 TABLET ORAL
Qty: 30 TABLET | Refills: 2 | Status: SHIPPED | OUTPATIENT
Start: 2024-09-28

## 2024-10-16 ENCOUNTER — OFFICE VISIT (OUTPATIENT)
Dept: ORTHOPEDICS | Facility: CLINIC | Age: 54
End: 2024-10-16
Payer: COMMERCIAL

## 2024-10-16 VITALS — WEIGHT: 200 LBS | BODY MASS INDEX: 32.28 KG/M2

## 2024-10-16 DIAGNOSIS — G56.21 ULNAR NEUROPATHY AT ELBOW OF RIGHT UPPER EXTREMITY: Primary | ICD-10-CM

## 2024-10-16 PROCEDURE — 99204 OFFICE O/P NEW MOD 45 MIN: CPT | Performed by: STUDENT IN AN ORGANIZED HEALTH CARE EDUCATION/TRAINING PROGRAM

## 2024-10-16 ASSESSMENT — PAIN SCALES - GENERAL: PAINLEVEL: MILD PAIN (2)
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
## 2024-10-24 DIAGNOSIS — M54.50 CHRONIC BILATERAL LOW BACK PAIN WITHOUT SCIATICA: ICD-10-CM

## 2024-10-24 DIAGNOSIS — G89.29 CHRONIC BILATERAL LOW BACK PAIN WITHOUT SCIATICA: ICD-10-CM
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
## 2024-11-17 ASSESSMENT — ASTHMA QUESTIONNAIRES
QUESTION_4 LAST FOUR WEEKS HOW OFTEN HAVE YOU USED YOUR RESCUE INHALER OR NEBULIZER MEDICATION (SUCH AS ALBUTEROL): NOT AT ALL
QUESTION_5 LAST FOUR WEEKS HOW WOULD YOU RATE YOUR ASTHMA CONTROL: COMPLETELY CONTROLLED
QUESTION_2 LAST FOUR WEEKS HOW OFTEN HAVE YOU HAD SHORTNESS OF BREATH: NOT AT ALL
ACT_TOTALSCORE: 25
QUESTION_1 LAST FOUR WEEKS HOW MUCH OF THE TIME DID YOUR ASTHMA KEEP YOU FROM GETTING AS MUCH DONE AT WORK, SCHOOL OR AT HOME: NONE OF THE TIME
QUESTION_3 LAST FOUR WEEKS HOW OFTEN DID YOUR ASTHMA SYMPTOMS (WHEEZING, COUGHING, SHORTNESS OF BREATH, CHEST TIGHTNESS OR PAIN) WAKE YOU UP AT NIGHT OR EARLIER THAN USUAL IN THE MORNING: NOT AT ALL
ACT_TOTALSCORE: 25

## 2024-11-17 ASSESSMENT — ANXIETY QUESTIONNAIRES
GAD7 TOTAL SCORE: 5
3. WORRYING TOO MUCH ABOUT DIFFERENT THINGS: SEVERAL DAYS
GAD7 TOTAL SCORE: 5
8. IF YOU CHECKED OFF ANY PROBLEMS, HOW DIFFICULT HAVE THESE MADE IT FOR YOU TO DO YOUR WORK, TAKE CARE OF THINGS AT HOME, OR GET ALONG WITH OTHER PEOPLE?: SOMEWHAT DIFFICULT
6. BECOMING EASILY ANNOYED OR IRRITABLE: NOT AT ALL
IF YOU CHECKED OFF ANY PROBLEMS ON THIS QUESTIONNAIRE, HOW DIFFICULT HAVE THESE PROBLEMS MADE IT FOR YOU TO DO YOUR WORK, TAKE CARE OF THINGS AT HOME, OR GET ALONG WITH OTHER PEOPLE: SOMEWHAT DIFFICULT
4. TROUBLE RELAXING: SEVERAL DAYS
2. NOT BEING ABLE TO STOP OR CONTROL WORRYING: SEVERAL DAYS
1. FEELING NERVOUS, ANXIOUS, OR ON EDGE: SEVERAL DAYS
7. FEELING AFRAID AS IF SOMETHING AWFUL MIGHT HAPPEN: SEVERAL DAYS
5. BEING SO RESTLESS THAT IT IS HARD TO SIT STILL: NOT AT ALL
GAD7 TOTAL SCORE: 5
7. FEELING AFRAID AS IF SOMETHING AWFUL MIGHT HAPPEN: SEVERAL DAYS

## 2024-11-19 ENCOUNTER — OFFICE VISIT (OUTPATIENT)
Dept: FAMILY MEDICINE | Facility: CLINIC | Age: 54
End: 2024-11-19
Payer: COMMERCIAL

## 2024-11-19 VITALS
SYSTOLIC BLOOD PRESSURE: 134 MMHG | WEIGHT: 197 LBS | HEIGHT: 66 IN | DIASTOLIC BLOOD PRESSURE: 86 MMHG | OXYGEN SATURATION: 100 % | HEART RATE: 113 BPM | TEMPERATURE: 97.5 F | BODY MASS INDEX: 31.66 KG/M2

## 2024-11-19 DIAGNOSIS — L73.2 HIDRADENITIS SUPPURATIVA: ICD-10-CM

## 2024-11-19 DIAGNOSIS — E65 ABDOMINAL PANNUS: ICD-10-CM

## 2024-11-19 DIAGNOSIS — F41.1 GAD (GENERALIZED ANXIETY DISORDER): ICD-10-CM

## 2024-11-19 DIAGNOSIS — E66.811 CLASS 1 OBESITY WITHOUT SERIOUS COMORBIDITY WITH BODY MASS INDEX (BMI) OF 31.0 TO 31.9 IN ADULT, UNSPECIFIED OBESITY TYPE: ICD-10-CM

## 2024-11-19 DIAGNOSIS — I10 HYPERTENSION GOAL BP (BLOOD PRESSURE) < 140/90: ICD-10-CM

## 2024-11-19 DIAGNOSIS — Z98.84 S/P GASTRIC BYPASS: ICD-10-CM

## 2024-11-19 DIAGNOSIS — F33.1 MAJOR DEPRESSIVE DISORDER, RECURRENT EPISODE, MODERATE (H): Primary | ICD-10-CM

## 2024-11-19 PROBLEM — R56.9 SEIZURE (H): Status: RESOLVED | Noted: 2023-02-13 | Resolved: 2024-11-19

## 2024-11-19 PROBLEM — T43.222A: Status: RESOLVED | Noted: 2020-07-30 | Resolved: 2024-11-19

## 2024-11-19 PROBLEM — T14.91XA SUICIDE ATTEMPT (H): Status: RESOLVED | Noted: 2020-07-30 | Resolved: 2024-11-19

## 2024-11-19 PROCEDURE — G2211 COMPLEX E/M VISIT ADD ON: HCPCS | Performed by: PHYSICIAN ASSISTANT

## 2024-11-19 PROCEDURE — 99214 OFFICE O/P EST MOD 30 MIN: CPT | Performed by: PHYSICIAN ASSISTANT

## 2024-11-19 RX ORDER — TOPIRAMATE 25 MG/1
25 TABLET, FILM COATED ORAL 2 TIMES DAILY
Qty: 180 TABLET | Refills: 1 | Status: SHIPPED | OUTPATIENT
Start: 2024-11-19

## 2024-11-19 RX ORDER — DULOXETIN HYDROCHLORIDE 60 MG/1
60 CAPSULE, DELAYED RELEASE ORAL 2 TIMES DAILY
Qty: 180 CAPSULE | Refills: 1 | Status: SHIPPED | OUTPATIENT
Start: 2024-11-19

## 2024-11-19 ASSESSMENT — PAIN SCALES - GENERAL: PAINLEVEL_OUTOF10: MILD PAIN (2)

## 2024-11-19 ASSESSMENT — PATIENT HEALTH QUESTIONNAIRE - PHQ9: SUM OF ALL RESPONSES TO PHQ QUESTIONS 1-9: 8

## 2024-11-19 ASSESSMENT — ENCOUNTER SYMPTOMS: HIP PAIN: 1

## 2024-11-19 NOTE — PROGRESS NOTES
Timi Rebolledo is a 54 year old, presenting for the following health issues:   Follow Up, Weight Problem, Hip Pain, and Derm Problem      11/19/2024    10:02 AM   Additional Questions   Roomed by Rosi HERNANDEZ   Accompanied by grandson     Hip Pain    History of Present Illness       Mental Health Follow-up:  Patient presents to follow-up on Depression & Anxiety.Patient's depression since last visit has been:  Worse  The patient is not having other symptoms associated with depression.  Patient's anxiety since last visit has been:  Worse  The patient is not having other symptoms associated with anxiety.  Any significant life events: No and health concerns  Patient is feeling anxious or having panic attacks.  Patient has no concerns about alcohol or drug use.    Reason for visit:  Blackheads, hip pain, menopause, weight    She eats 0-1 servings of fruits and vegetables daily.She consumes 1 sweetened beverage(s) daily.She exercises with enough effort to increase her heart rate 10 to 19 minutes per day.  She exercises with enough effort to increase her heart rate 4 days per week. She is missing 1 dose(s) of medications per week.  She is not taking prescribed medications regularly due to remembering to take.     Moods doing OK - struggling with feeling down and having lack of motivation. Gets up with her  but then often goes back to bed until noon. Constantly feels fatigued and wore out.     Frustrated by her weight. Had previous bypass surgery. Weight has been increasing despite not eating much. Does endorse she does not get as much exercise as she should.     Has chronic irritation from her abdominal skin fold.     Would like to see dermatology as well regarding chronic hidradenitis.     Patient presents today for follow-up of blood pressure. Numbers have been well controlled. Tolerating medications well without side effects. Monitoring salt in diet. Patient denies headaches, vision changes, chest pain,  "shortness of breath or paresthesias.        Review of Systems  Constitutional, HEENT, cardiovascular, pulmonary, GI, , musculoskeletal, neuro, skin, endocrine and psych systems are negative, except as otherwise noted.      Objective    /86   Pulse 113   Temp 97.5  F (36.4  C) (Temporal)   Ht 1.676 m (5' 6\")   Wt 89.4 kg (197 lb)   LMP 12/01/2016 (Approximate)   SpO2 100%   BMI 31.80 kg/m    Body mass index is 31.8 kg/m .  Physical Exam   GENERAL: alert and no distress  HENT: ear canals and TM's normal, nose and mouth without ulcers or lesions  NECK: no adenopathy, no asymmetry, masses, or scars  RESP: lungs clear to auscultation - no rales, rhonchi or wheezes  CV: regular rate and rhythm, normal S1 S2, no S3 or S4, no murmur, click or rub, no peripheral edema   MS: no gross musculoskeletal defects noted, no edema  SKIN: no suspicious lesions or rashes  PSYCH: mentation appears normal, affect normal/bright        Assessment & Plan     Major depressive disorder, recurrent episode, moderate (H)  Dose increase to 60 mg BID at this time. Patient will follow-up in clinic if new symptoms develop or current symptoms fail to improve.  - DULoxetine (CYMBALTA) 60 MG capsule; Take 1 capsule (60 mg) by mouth 2 times daily.    S/P gastric bypass  We discussed starting Zepbound pending insurance coverage.  Appropriate use, side effects and dose titration if needed discussed. Encouraged ongoing diet and exercise modifications as able.   - topiramate (TOPAMAX) 25 MG tablet; Take 1 tablet (25 mg) by mouth 2 times daily.  - tirzepatide-Weight Management (ZEPBOUND) 2.5 MG/0.5ML prefilled pen; Inject 0.5 mLs (2.5 mg) subcutaneously every 7 days.    HONEY (generalized anxiety disorder)  - DULoxetine (CYMBALTA) 60 MG capsule; Take 1 capsule (60 mg) by mouth 2 times daily.    Hidradenitis suppurativa  Referral placed to dermatology.  - topiramate (TOPAMAX) 25 MG tablet; Take 1 tablet (25 mg) by mouth 2 times daily.  - Adult " Dermatology  Referral; Future    Abdominal pannus  - topiramate (TOPAMAX) 25 MG tablet; Take 1 tablet (25 mg) by mouth 2 times daily.    Class 1 obesity without serious comorbidity with body mass index (BMI) of 31.0 to 31.9 in adult, unspecified obesity type  Encouraged ongoing diet and exercise modifications as able.   - tirzepatide-Weight Management (ZEPBOUND) 2.5 MG/0.5ML prefilled pen; Inject 0.5 mLs (2.5 mg) subcutaneously every 7 days.    Hypertension goal BP (blood pressure) < 140/90  - tirzepatide-Weight Management (ZEPBOUND) 2.5 MG/0.5ML prefilled pen; Inject 0.5 mLs (2.5 mg) subcutaneously every 7 days.    The longitudinal plan of care for the diagnosis(es)/condition(s) as documented were addressed during this visit. Due to the added complexity in care, I will continue to support Kesha in the subsequent management and with ongoing continuity of care.    Signed Electronically by: Rosi Soriano PA-C

## 2024-11-20 DIAGNOSIS — L73.2 HIDRADENITIS SUPPURATIVA: ICD-10-CM

## 2024-11-21 RX ORDER — DOXYCYCLINE 100 MG/1
100 CAPSULE ORAL 2 TIMES DAILY
Qty: 60 CAPSULE | Refills: 1 | Status: SHIPPED | OUTPATIENT
Start: 2024-11-21

## 2024-12-03 ENCOUNTER — TELEPHONE (OUTPATIENT)
Dept: NURSING | Facility: CLINIC | Age: 54
End: 2024-12-03
Payer: COMMERCIAL

## 2024-12-04 NOTE — TELEPHONE ENCOUNTER
[employer] representative calling regarding a prior authorization.  Advised to call back in the morning when the clinic is open. Caller agreeable.

## 2024-12-22 DIAGNOSIS — E65 ABDOMINAL PANNUS: ICD-10-CM

## 2024-12-22 DIAGNOSIS — Z98.84 S/P GASTRIC BYPASS: ICD-10-CM

## 2024-12-22 DIAGNOSIS — J45.30 MILD PERSISTENT ASTHMA WITHOUT COMPLICATION: ICD-10-CM

## 2024-12-22 DIAGNOSIS — L73.2 HIDRADENITIS SUPPURATIVA: ICD-10-CM

## 2024-12-23 RX ORDER — PHENTERMINE HYDROCHLORIDE 37.5 MG/1
1 TABLET ORAL
Qty: 30 TABLET | Refills: 2 | Status: SHIPPED | OUTPATIENT
Start: 2024-12-23

## 2024-12-23 RX ORDER — FLUTICASONE PROPIONATE AND SALMETEROL 50; 250 UG/1; UG/1
1 POWDER RESPIRATORY (INHALATION) EVERY 12 HOURS
Qty: 60 EACH | Refills: 1 | Status: SHIPPED | OUTPATIENT
Start: 2024-12-23

## 2025-01-19 DIAGNOSIS — L73.2 HIDRADENITIS SUPPURATIVA: ICD-10-CM

## 2025-01-20 RX ORDER — DOXYCYCLINE 100 MG/1
100 CAPSULE ORAL 2 TIMES DAILY
Qty: 60 CAPSULE | Refills: 1 | Status: SHIPPED | OUTPATIENT
Start: 2025-01-20

## 2025-02-27 ENCOUNTER — PATIENT OUTREACH (OUTPATIENT)
Dept: CARE COORDINATION | Facility: CLINIC | Age: 55
End: 2025-02-27
Payer: COMMERCIAL

## 2025-02-27 DIAGNOSIS — G89.29 CHRONIC BILATERAL LOW BACK PAIN WITHOUT SCIATICA: ICD-10-CM

## 2025-02-27 DIAGNOSIS — M54.50 CHRONIC BILATERAL LOW BACK PAIN WITHOUT SCIATICA: ICD-10-CM

## 2025-02-27 DIAGNOSIS — J45.30 MILD PERSISTENT ASTHMA WITHOUT COMPLICATION: ICD-10-CM

## 2025-02-27 RX ORDER — FLUTICASONE PROPIONATE AND SALMETEROL 50; 250 UG/1; UG/1
1 POWDER RESPIRATORY (INHALATION) EVERY 12 HOURS
Qty: 60 EACH | Refills: 1 | Status: SHIPPED | OUTPATIENT
Start: 2025-02-27

## 2025-03-24 ENCOUNTER — MYC MEDICAL ADVICE (OUTPATIENT)
Dept: FAMILY MEDICINE | Facility: CLINIC | Age: 55
End: 2025-03-24
Payer: COMMERCIAL

## 2025-03-24 ENCOUNTER — TELEPHONE (OUTPATIENT)
Dept: FAMILY MEDICINE | Facility: CLINIC | Age: 55
End: 2025-03-24
Payer: COMMERCIAL

## 2025-03-24 NOTE — TELEPHONE ENCOUNTER
RN did talk with George from insurance again. She needed to clarify that Saxenda was being dispensed in ml or mg.  RN reviewed with George the prescription said to dispense 38 ml.  She verbalized understanding and denied any other questions.

## 2025-03-24 NOTE — TELEPHONE ENCOUNTER
Spoke with insurance, reviewed medication order and quantity for PA.    Jade Lopez RN on 3/24/2025 at 12:47 PM

## 2025-03-27 ENCOUNTER — PATIENT OUTREACH (OUTPATIENT)
Dept: CARE COORDINATION | Facility: CLINIC | Age: 55
End: 2025-03-27
Payer: COMMERCIAL

## 2025-03-31 ENCOUNTER — TELEPHONE (OUTPATIENT)
Dept: FAMILY MEDICINE | Facility: CLINIC | Age: 55
End: 2025-03-31
Payer: COMMERCIAL

## 2025-03-31 NOTE — TELEPHONE ENCOUNTER
Received call from [facility] regarding patient's weight loss and exercise. Did let them know the most recent visit and what she had tried as well as what was documented for her exercise levels at last visit.    Jade Lopez RN on 3/31/2025 at 8:29 AM

## 2025-04-14 ENCOUNTER — PATIENT OUTREACH (OUTPATIENT)
Dept: CARE COORDINATION | Facility: CLINIC | Age: 55
End: 2025-04-14
Payer: COMMERCIAL

## 2025-04-20 DIAGNOSIS — J45.30 MILD PERSISTENT ASTHMA WITHOUT COMPLICATION: ICD-10-CM

## 2025-04-20 DIAGNOSIS — E65 ABDOMINAL PANNUS: ICD-10-CM

## 2025-04-20 DIAGNOSIS — Z98.84 S/P GASTRIC BYPASS: ICD-10-CM

## 2025-04-20 DIAGNOSIS — L73.2 HIDRADENITIS SUPPURATIVA: ICD-10-CM

## 2025-04-21 RX ORDER — FLUTICASONE PROPIONATE AND SALMETEROL 50; 250 UG/1; UG/1
1 POWDER RESPIRATORY (INHALATION) EVERY 12 HOURS
Qty: 60 EACH | Refills: 1 | Status: SHIPPED | OUTPATIENT
Start: 2025-04-21

## 2025-04-21 RX ORDER — PHENTERMINE HYDROCHLORIDE 37.5 MG/1
1 TABLET ORAL
Qty: 30 TABLET | Refills: 2 | Status: SHIPPED | OUTPATIENT
Start: 2025-04-21

## 2025-04-26 DIAGNOSIS — Z87.898 HISTORY OF CHRONIC COUGH: ICD-10-CM

## 2025-04-28 RX ORDER — ALBUTEROL SULFATE 90 UG/1
2 AEROSOL, METERED RESPIRATORY (INHALATION) EVERY 6 HOURS
Qty: 18 G | Refills: 1 | Status: SHIPPED | OUTPATIENT
Start: 2025-04-28

## 2025-05-19 DIAGNOSIS — L73.2 HIDRADENITIS SUPPURATIVA: ICD-10-CM

## 2025-05-19 DIAGNOSIS — F33.1 MAJOR DEPRESSIVE DISORDER, RECURRENT EPISODE, MODERATE (H): ICD-10-CM

## 2025-05-19 DIAGNOSIS — F41.1 GAD (GENERALIZED ANXIETY DISORDER): ICD-10-CM

## 2025-05-19 ASSESSMENT — ASTHMA QUESTIONNAIRES
ACT_TOTALSCORE: 20
QUESTION_5 LAST FOUR WEEKS HOW WOULD YOU RATE YOUR ASTHMA CONTROL: WELL CONTROLLED
QUESTION_4 LAST FOUR WEEKS HOW OFTEN HAVE YOU USED YOUR RESCUE INHALER OR NEBULIZER MEDICATION (SUCH AS ALBUTEROL): ONCE A WEEK OR LESS
QUESTION_1 LAST FOUR WEEKS HOW MUCH OF THE TIME DID YOUR ASTHMA KEEP YOU FROM GETTING AS MUCH DONE AT WORK, SCHOOL OR AT HOME: A LITTLE OF THE TIME
QUESTION_2 LAST FOUR WEEKS HOW OFTEN HAVE YOU HAD SHORTNESS OF BREATH: ONCE OR TWICE A WEEK
QUESTION_3 LAST FOUR WEEKS HOW OFTEN DID YOUR ASTHMA SYMPTOMS (WHEEZING, COUGHING, SHORTNESS OF BREATH, CHEST TIGHTNESS OR PAIN) WAKE YOU UP AT NIGHT OR EARLIER THAN USUAL IN THE MORNING: ONCE OR TWICE

## 2025-05-19 ASSESSMENT — PATIENT HEALTH QUESTIONNAIRE - PHQ9
10. IF YOU CHECKED OFF ANY PROBLEMS, HOW DIFFICULT HAVE THESE PROBLEMS MADE IT FOR YOU TO DO YOUR WORK, TAKE CARE OF THINGS AT HOME, OR GET ALONG WITH OTHER PEOPLE: EXTREMELY DIFFICULT
SUM OF ALL RESPONSES TO PHQ QUESTIONS 1-9: 19
SUM OF ALL RESPONSES TO PHQ QUESTIONS 1-9: 19

## 2025-05-20 ENCOUNTER — OFFICE VISIT (OUTPATIENT)
Dept: FAMILY MEDICINE | Facility: CLINIC | Age: 55
End: 2025-05-20
Payer: COMMERCIAL

## 2025-05-20 VITALS
OXYGEN SATURATION: 99 % | DIASTOLIC BLOOD PRESSURE: 88 MMHG | HEART RATE: 74 BPM | RESPIRATION RATE: 16 BRPM | SYSTOLIC BLOOD PRESSURE: 134 MMHG | BODY MASS INDEX: 31.31 KG/M2 | HEIGHT: 66 IN | WEIGHT: 194.8 LBS | TEMPERATURE: 98.3 F

## 2025-05-20 DIAGNOSIS — R53.83 OTHER FATIGUE: ICD-10-CM

## 2025-05-20 DIAGNOSIS — I10 HYPERTENSION GOAL BP (BLOOD PRESSURE) < 140/90: ICD-10-CM

## 2025-05-20 DIAGNOSIS — K21.00 GASTROESOPHAGEAL REFLUX DISEASE WITH ESOPHAGITIS WITHOUT HEMORRHAGE: ICD-10-CM

## 2025-05-20 DIAGNOSIS — Z13.220 LIPID SCREENING: ICD-10-CM

## 2025-05-20 DIAGNOSIS — N89.8 VAGINAL IRRITATION: ICD-10-CM

## 2025-05-20 DIAGNOSIS — Z12.31 VISIT FOR SCREENING MAMMOGRAM: ICD-10-CM

## 2025-05-20 DIAGNOSIS — F33.1 MAJOR DEPRESSIVE DISORDER, RECURRENT, MODERATE (H): Primary | ICD-10-CM

## 2025-05-20 PROBLEM — L98.7 EXCESS SKIN OF ABDOMEN: Status: RESOLVED | Noted: 2023-06-08 | Resolved: 2025-05-20

## 2025-05-20 PROBLEM — E65 ABDOMINAL PANNUS: Status: RESOLVED | Noted: 2023-02-13 | Resolved: 2025-05-20

## 2025-05-20 PROBLEM — L98.7 EXCESS SKIN OF THIGH: Status: RESOLVED | Noted: 2024-04-09 | Resolved: 2025-05-20

## 2025-05-20 LAB
ALBUMIN SERPL BCG-MCNC: 4.5 G/DL (ref 3.5–5.2)
ALP SERPL-CCNC: 114 U/L (ref 40–150)
ALT SERPL W P-5'-P-CCNC: 10 U/L (ref 0–50)
ANION GAP SERPL CALCULATED.3IONS-SCNC: 13 MMOL/L (ref 7–15)
AST SERPL W P-5'-P-CCNC: 12 U/L (ref 0–45)
BASOPHILS # BLD AUTO: 0.1 10E3/UL (ref 0–0.2)
BASOPHILS NFR BLD AUTO: 1 %
BILIRUB SERPL-MCNC: 0.3 MG/DL
BUN SERPL-MCNC: 10.8 MG/DL (ref 6–20)
CALCIUM SERPL-MCNC: 9.4 MG/DL (ref 8.8–10.4)
CHLORIDE SERPL-SCNC: 103 MMOL/L (ref 98–107)
CHOLEST SERPL-MCNC: 268 MG/DL
CLUE CELLS: NORMAL
CREAT SERPL-MCNC: 0.88 MG/DL (ref 0.51–0.95)
EGFRCR SERPLBLD CKD-EPI 2021: 77 ML/MIN/1.73M2
EOSINOPHIL # BLD AUTO: 0.3 10E3/UL (ref 0–0.7)
EOSINOPHIL NFR BLD AUTO: 6 %
ERYTHROCYTE [DISTWIDTH] IN BLOOD BY AUTOMATED COUNT: 11.9 % (ref 10–15)
FASTING STATUS PATIENT QL REPORTED: NO
FASTING STATUS PATIENT QL REPORTED: NO
GLUCOSE SERPL-MCNC: 98 MG/DL (ref 70–99)
HCO3 SERPL-SCNC: 24 MMOL/L (ref 22–29)
HCT VFR BLD AUTO: 37.7 % (ref 35–47)
HDLC SERPL-MCNC: 76 MG/DL
HGB BLD-MCNC: 13.1 G/DL (ref 11.7–15.7)
IMM GRANULOCYTES # BLD: 0 10E3/UL
IMM GRANULOCYTES NFR BLD: 0 %
LDLC SERPL CALC-MCNC: 160 MG/DL
LYMPHOCYTES # BLD AUTO: 2.4 10E3/UL (ref 0.8–5.3)
LYMPHOCYTES NFR BLD AUTO: 45 %
MCH RBC QN AUTO: 29.6 PG (ref 26.5–33)
MCHC RBC AUTO-ENTMCNC: 34.7 G/DL (ref 31.5–36.5)
MCV RBC AUTO: 85 FL (ref 78–100)
MONOCYTES # BLD AUTO: 0.4 10E3/UL (ref 0–1.3)
MONOCYTES NFR BLD AUTO: 8 %
NEUTROPHILS # BLD AUTO: 2.2 10E3/UL (ref 1.6–8.3)
NEUTROPHILS NFR BLD AUTO: 40 %
NONHDLC SERPL-MCNC: 192 MG/DL
NRBC # BLD AUTO: 0 10E3/UL
NRBC BLD AUTO-RTO: 0 /100
PLATELET # BLD AUTO: 259 10E3/UL (ref 150–450)
POTASSIUM SERPL-SCNC: 4.7 MMOL/L (ref 3.4–5.3)
PROT SERPL-MCNC: 7.7 G/DL (ref 6.4–8.3)
RBC # BLD AUTO: 4.43 10E6/UL (ref 3.8–5.2)
SODIUM SERPL-SCNC: 140 MMOL/L (ref 135–145)
TRICHOMONAS, WET PREP: NORMAL
TRIGL SERPL-MCNC: 161 MG/DL
TSH SERPL DL<=0.005 MIU/L-ACNC: 3.66 UIU/ML (ref 0.3–4.2)
VIT B12 SERPL-MCNC: 313 PG/ML (ref 232–1245)
VIT D+METAB SERPL-MCNC: 42 NG/ML (ref 20–50)
WBC # BLD AUTO: 5.4 10E3/UL (ref 4–11)
WBC'S/HIGH POWER FIELD, WET PREP: NORMAL
YEAST, WET PREP: NORMAL

## 2025-05-20 PROCEDURE — 36415 COLL VENOUS BLD VENIPUNCTURE: CPT | Performed by: PHYSICIAN ASSISTANT

## 2025-05-20 PROCEDURE — 80053 COMPREHEN METABOLIC PANEL: CPT | Performed by: PHYSICIAN ASSISTANT

## 2025-05-20 PROCEDURE — 82607 VITAMIN B-12: CPT | Performed by: PHYSICIAN ASSISTANT

## 2025-05-20 PROCEDURE — 84443 ASSAY THYROID STIM HORMONE: CPT | Performed by: PHYSICIAN ASSISTANT

## 2025-05-20 PROCEDURE — 82306 VITAMIN D 25 HYDROXY: CPT | Performed by: PHYSICIAN ASSISTANT

## 2025-05-20 PROCEDURE — 85025 COMPLETE CBC W/AUTO DIFF WBC: CPT | Performed by: PHYSICIAN ASSISTANT

## 2025-05-20 PROCEDURE — G2211 COMPLEX E/M VISIT ADD ON: HCPCS | Performed by: PHYSICIAN ASSISTANT

## 2025-05-20 PROCEDURE — 87210 SMEAR WET MOUNT SALINE/INK: CPT | Performed by: PHYSICIAN ASSISTANT

## 2025-05-20 PROCEDURE — 99214 OFFICE O/P EST MOD 30 MIN: CPT | Performed by: PHYSICIAN ASSISTANT

## 2025-05-20 PROCEDURE — 80061 LIPID PANEL: CPT | Performed by: PHYSICIAN ASSISTANT

## 2025-05-20 RX ORDER — DULOXETIN HYDROCHLORIDE 60 MG/1
60 CAPSULE, DELAYED RELEASE ORAL 2 TIMES DAILY
Qty: 180 CAPSULE | Refills: 1 | Status: SHIPPED | OUTPATIENT
Start: 2025-05-20

## 2025-05-20 RX ORDER — DOXYCYCLINE 100 MG/1
100 CAPSULE ORAL 2 TIMES DAILY
Qty: 60 CAPSULE | Refills: 1 | Status: SHIPPED | OUTPATIENT
Start: 2025-05-20

## 2025-05-20 ASSESSMENT — PAIN SCALES - GENERAL: PAINLEVEL_OUTOF10: NO PAIN (0)

## 2025-05-20 NOTE — PROGRESS NOTES
"    Subjective   Kesha is a 55 year old, presenting for the following health issues:  Recheck Medication, Derm Problem, Weight Management, Wart (Bottom of right foot), Throat Problem (I get a dry tickle on my right side and I cough and cough then start gagging and dry heaving ), and Vaginal Problem        5/20/2025     7:55 AM   Additional Questions   Roomed by Alyse     History of Present Illness       Reason for visit:  Meds, wgt. loss meds, wart, throat gag She is missing 1 dose(s) of medications per week.  She is not taking prescribed medications regularly due to other.   Kesha David, a 55-year-old female, has been experiencing significant fatigue, spending most of her time in bed and feeling exhausted after participating in activities such as graduations and Easter celebrations over the past month. She reports that this level of fatigue is normal for her but not for others, and she often feels wiped out after social events. Kesha dislikes attending these events because she anticipates suffering afterward and prefers to return to bed as soon as possible. Her fatigue has affected her ability to perform basic household tasks and make decisions, such as what to eat for supper.    Kesha experiences interrupted sleep, averaging only 3 to 4 hours per night, which has been restless. She manages her mental health without major crashes but experiences significant episodes twice a year, during which she struggles to get out of bed and neglects personal hygiene, such as not showering for seven days. Her anxiety levels have been high throughout the past month, leading her to want to disappear and avoid upcoming social gatherings [place]. She denies active suicidal ideation but more just wishing \"she could disappear\".    She has a history of psychiatric care, including both inpatient and outpatient treatment, and is familiar with therapeutic techniques like CBT and DBT. Kesha suspects a " "chemical imbalance might be contributing to her symptoms since she hasn't experienced any changes or events that could explain her current state.    Recently, Kesha has experienced burning and itching in her vaginal area since Tuesday of last week, which she wonders might be related to her 's antibiotic treatment for prostatitis.    She also reports a persistent dry itch in her throat that triggers coughing fits, which began after consuming Grape[employer]uts cereal for fiber. Despite discontinuing the cereal, the dry cough persists without any sensation of something being stuck in her throat. She continues to take omeprazole daily but notes it hasn't been effective in the past couple of weeks. She is taking 20 mg once daily.    Kesha attempted weight loss medication last year but was unable to proceed due to insurance issues during the holidays. When she tried again this year, insurance denied coverage despite previous approval. Her weight has decreased slightly on its own recently. She is still frustrated with her weight overall however not actively doing any form of exercise. Previous history of gastric surgery.    Review of Systems  Constitutional, HEENT, cardiovascular, pulmonary, GI, , musculoskeletal, neuro, skin, endocrine and psych systems are negative, except as otherwise noted.      Objective    /88   Pulse 74   Temp 98.3  F (36.8  C) (Oral)   Resp 16   Ht 1.676 m (5' 6\")   Wt 88.4 kg (194 lb 12.8 oz)   LMP 12/01/2016 (Approximate)   SpO2 99%   BMI 31.44 kg/m    Body mass index is 31.44 kg/m .  Physical Exam   GENERAL: alert and no distress  HENT: ear canals and TM's normal, nose and mouth without ulcers or lesions  NECK: no adenopathy, no asymmetry, masses, or scars  RESP: lungs clear to auscultation - no rales, rhonchi or wheezes  CV: regular rate and rhythm, normal S1 S2, no S3 or S4, no murmur, click or rub, no peripheral edema   ABDOMEN: soft, nontender, no hepatosplenomegaly, no " masses and bowel sounds normal  MS: no gross musculoskeletal defects noted, no edema  SKIN: no suspicious lesions or rashes  PSYCH: mentation appears normal, affect normal/bright        Assessment & Plan     Major depressive disorder, recurrent, moderate (H)  We had a long conversation about mental health and that likely chronic fatigue syndrome related with this. She has had inpatient and outpatient treatments in the past. We have also had previous conversations about possible ADHD. Encouraged connecting back with a psychiatrist and patient agreeable.   - Adult Mental Health  Referral; Future    Other fatigue  Labs ordered today to help further evaluate however did encourage connecting with psychiatry as above as well.   - CBC with platelets and differential; Future  - Vitamin D Deficiency; Future  - TSH with free T4 reflex; Future  - Vitamin B12; Future  - CBC with platelets and differential  - Vitamin D Deficiency  - TSH with free T4 reflex  - Vitamin B12    Gastroesophageal reflux disease with esophagitis without hemorrhage  Suspect globus sensation she has is related to her GERD. Will have her trial increasing Omeprazole to 20 mg BID for 2 weeks. If still not improving further evaluation recommended.     Hypertension goal BP (blood pressure) < 140/90  High end of normal. Will continue to monitor as patient is hoping to continue working on weight loss.   - Comprehensive metabolic panel (BMP + Alb, Alk Phos, ALT, AST, Total. Bili, TP); Future  - Comprehensive metabolic panel (BMP + Alb, Alk Phos, ALT, AST, Total. Bili, TP)    Visit for screening mammogram  - MA Screening Bilateral w/ B[facility]; Future    Lipid screening  - Lipid panel reflex to direct LDL Fasting; Future  - Lipid panel reflex to direct LDL Fasting    Vaginal irritation  Wet prep collected today. Treatment pending these results.   - Wet prep - lab collect; Future  - Wet prep - lab collect    The longitudinal plan of care for the  diagnosis(es)/condition(s) as documented were addressed during this visit. Due to the added complexity in care, I will continue to support Kesha in the subsequent management and with ongoing continuity of care.    Signed Electronically by: Rosi Soriano PA-C

## 2025-05-21 ENCOUNTER — RESULTS FOLLOW-UP (OUTPATIENT)
Dept: FAMILY MEDICINE | Facility: CLINIC | Age: 55
End: 2025-05-21

## 2025-05-21 ENCOUNTER — PATIENT OUTREACH (OUTPATIENT)
Dept: CARE COORDINATION | Facility: CLINIC | Age: 55
End: 2025-05-21
Payer: COMMERCIAL

## 2025-06-14 ENCOUNTER — HEALTH MAINTENANCE LETTER (OUTPATIENT)
Age: 55
End: 2025-06-14

## 2025-06-15 DIAGNOSIS — Z87.898 HISTORY OF CHRONIC COUGH: ICD-10-CM

## 2025-06-16 RX ORDER — ALBUTEROL SULFATE 90 UG/1
2 INHALANT RESPIRATORY (INHALATION) EVERY 6 HOURS
Qty: 18 G | Refills: 1 | Status: SHIPPED | OUTPATIENT
Start: 2025-06-16

## 2025-06-18 ENCOUNTER — MYC MEDICAL ADVICE (OUTPATIENT)
Dept: FAMILY MEDICINE | Facility: CLINIC | Age: 55
End: 2025-06-18
Payer: COMMERCIAL

## 2025-06-18 DIAGNOSIS — B37.31 YEAST INFECTION OF THE VAGINA: Primary | ICD-10-CM

## 2025-06-19 RX ORDER — FLUCONAZOLE 150 MG/1
150 TABLET ORAL ONCE
Qty: 2 TABLET | Refills: 0 | Status: SHIPPED | OUTPATIENT
Start: 2025-06-19 | End: 2025-06-19

## 2025-07-18 DIAGNOSIS — L73.2 HIDRADENITIS SUPPURATIVA: ICD-10-CM

## 2025-07-21 RX ORDER — DOXYCYCLINE 100 MG/1
100 CAPSULE ORAL 2 TIMES DAILY
Qty: 60 CAPSULE | Refills: 1 | Status: SHIPPED | OUTPATIENT
Start: 2025-07-21

## 2025-08-12 DIAGNOSIS — Z87.898 HISTORY OF CHRONIC COUGH: ICD-10-CM

## 2025-08-12 RX ORDER — ALBUTEROL SULFATE 90 UG/1
2 INHALANT RESPIRATORY (INHALATION) EVERY 6 HOURS
Qty: 18 G | Refills: 1 | Status: SHIPPED | OUTPATIENT
Start: 2025-08-12

## 2025-08-17 DIAGNOSIS — J45.30 MILD PERSISTENT ASTHMA WITHOUT COMPLICATION: ICD-10-CM

## 2025-08-18 RX ORDER — FLUTICASONE PROPIONATE AND SALMETEROL 50; 250 UG/1; UG/1
1 POWDER RESPIRATORY (INHALATION) EVERY 12 HOURS
Qty: 180 EACH | Refills: 1 | Status: SHIPPED | OUTPATIENT
Start: 2025-08-18

## 2025-08-25 DIAGNOSIS — E65 ABDOMINAL PANNUS: ICD-10-CM

## 2025-08-25 DIAGNOSIS — Z98.84 S/P GASTRIC BYPASS: ICD-10-CM

## 2025-08-25 DIAGNOSIS — L73.2 HIDRADENITIS SUPPURATIVA: ICD-10-CM

## 2025-08-26 RX ORDER — PHENTERMINE HYDROCHLORIDE 37.5 MG/1
1 TABLET ORAL
Qty: 30 TABLET | Refills: 2 | Status: SHIPPED | OUTPATIENT
Start: 2025-08-26

## 2025-09-04 ENCOUNTER — OFFICE VISIT (OUTPATIENT)
Dept: DERMATOLOGY | Facility: CLINIC | Age: 55
End: 2025-09-04
Payer: COMMERCIAL

## 2025-09-04 DIAGNOSIS — L73.2 HIDRADENITIS SUPPURATIVA: Primary | ICD-10-CM

## 2025-09-04 DIAGNOSIS — Z51.81 THERAPEUTIC DRUG MONITORING: ICD-10-CM

## 2025-09-04 LAB
ALBUMIN SERPL BCG-MCNC: 4.5 G/DL (ref 3.5–5.2)
ALP SERPL-CCNC: 123 U/L (ref 40–150)
ALT SERPL W P-5'-P-CCNC: 10 U/L (ref 0–50)
ANION GAP SERPL CALCULATED.3IONS-SCNC: 14 MMOL/L (ref 7–15)
AST SERPL W P-5'-P-CCNC: 14 U/L (ref 0–45)
BASOPHILS # BLD AUTO: 0.05 10E3/UL (ref 0–0.2)
BASOPHILS NFR BLD AUTO: 0.8 %
BILIRUB SERPL-MCNC: 0.3 MG/DL
BUN SERPL-MCNC: 8.2 MG/DL (ref 6–20)
CALCIUM SERPL-MCNC: 9.1 MG/DL (ref 8.8–10.4)
CHLORIDE SERPL-SCNC: 104 MMOL/L (ref 98–107)
CREAT SERPL-MCNC: 1 MG/DL (ref 0.51–0.95)
EGFRCR SERPLBLD CKD-EPI 2021: 66 ML/MIN/1.73M2
EOSINOPHIL # BLD AUTO: 0.2 10E3/UL (ref 0–0.7)
EOSINOPHIL NFR BLD AUTO: 3.2 %
ERYTHROCYTE [DISTWIDTH] IN BLOOD BY AUTOMATED COUNT: 13.2 % (ref 10–15)
GLUCOSE SERPL-MCNC: 98 MG/DL (ref 70–99)
HCO3 SERPL-SCNC: 22 MMOL/L (ref 22–29)
HCT VFR BLD AUTO: 38.6 % (ref 35–47)
HGB BLD-MCNC: 13.4 G/DL (ref 11.7–15.7)
IMM GRANULOCYTES # BLD: <0.03 10E3/UL
IMM GRANULOCYTES NFR BLD: 0.3 %
LYMPHOCYTES # BLD AUTO: 1.83 10E3/UL (ref 0.8–5.3)
LYMPHOCYTES NFR BLD AUTO: 29.2 %
MCH RBC QN AUTO: 28.6 PG (ref 26.5–33)
MCHC RBC AUTO-ENTMCNC: 34.7 G/DL (ref 31.5–36.5)
MCV RBC AUTO: 82.3 FL (ref 78–100)
MONOCYTES # BLD AUTO: 0.29 10E3/UL (ref 0–1.3)
MONOCYTES NFR BLD AUTO: 4.6 %
NEUTROPHILS # BLD AUTO: 3.87 10E3/UL (ref 1.6–8.3)
NEUTROPHILS NFR BLD AUTO: 61.9 %
NRBC # BLD AUTO: <0.03 10E3/UL
NRBC BLD AUTO-RTO: 0 /100
PLATELET # BLD AUTO: 257 10E3/UL (ref 150–450)
POTASSIUM SERPL-SCNC: 3.4 MMOL/L (ref 3.4–5.3)
PROT SERPL-MCNC: 7.9 G/DL (ref 6.4–8.3)
RBC # BLD AUTO: 4.69 10E6/UL (ref 3.8–5.2)
SODIUM SERPL-SCNC: 140 MMOL/L (ref 135–145)
WBC # BLD AUTO: 6.26 10E3/UL (ref 4–11)

## 2025-09-04 RX ORDER — CLINDAMYCIN PHOSPHATE 10 MG/G
GEL TOPICAL PRN
COMMUNITY
Start: 2025-06-19

## 2025-09-04 RX ORDER — FLUCONAZOLE 150 MG/1
TABLET ORAL PRN
COMMUNITY
Start: 2025-06-19

## 2025-09-04 RX ORDER — RESORCINOL
POWDER (GRAM) MISCELLANEOUS
Qty: 30 G | Refills: 11 | Status: SHIPPED | OUTPATIENT
Start: 2025-09-04

## 2025-09-04 ASSESSMENT — PAIN SCALES - GENERAL: PAINLEVEL_OUTOF10: NO PAIN (0)

## (undated) DEVICE — DRAPE U SPLIT 74X120" 29440

## (undated) DEVICE — DRAPE C-ARM PACK 07PK803

## (undated) DEVICE — Device

## (undated) DEVICE — GOWN XLG DISP 9545

## (undated) DEVICE — PREP CHLORAPREP ORANGE 3ML  260415

## (undated) DEVICE — SU STRATAFIX 2-0 MH 36" SXPD2B412

## (undated) DEVICE — TUBING SUCTION 6"X3/16" N56A

## (undated) DEVICE — SYR 10ML LL W/O NDL

## (undated) DEVICE — GLOVE BIOGEL PI MICRO INDICATOR UNDERGLOVE SZ 7.5 48975

## (undated) DEVICE — TRAY EPDRL 3.5IN 17GA 19GA PRFX BPA DEHP 332079

## (undated) DEVICE — DRAPE SHEET HALF 40X60" 9358

## (undated) DEVICE — CLIP APPLIER 11" MED LIGACLIP MCM20

## (undated) DEVICE — SUCTION TIP YANKAUER W/O VENT K86

## (undated) DEVICE — DRSG GAUZE 4X4" 3033

## (undated) DEVICE — PREP CHLORAPREP 26ML TINTED ORANGE  260815

## (undated) DEVICE — SU MONOCRYL 2-0 SH 27" UND Y417H

## (undated) DEVICE — DRSG XEROFORM 5X9" 8884431605

## (undated) DEVICE — SOL WATER IRRIG 1000ML BOTTLE 07139-09

## (undated) DEVICE — SYR 05ML LL W/O NDL

## (undated) DEVICE — SU MONOCRYL 4-0 PS-2 18" UND Y496G

## (undated) DEVICE — STPL SKIN 35W 054887

## (undated) DEVICE — SU PDS II 0 CT-1 27" Z340H

## (undated) DEVICE — DRSG KERLIX 4 1/2"X4YDS ROLL 6715

## (undated) DEVICE — SOL WATER IRRIG 1000ML BOTTLE 2F7114

## (undated) DEVICE — TUBING EXTENSION SET MICROBORE 21CM LL 6N8374

## (undated) DEVICE — NDL COUNTER 20CT 31142493

## (undated) DEVICE — SU STRATAFIX MONOCRYL 3-0 SPIRAL PS-2 45CM SXMP1B107

## (undated) DEVICE — DRSG TEGADERM 4X4 3/4" 1626W

## (undated) DEVICE — SYR BULB IRRIG DOVER 60 ML LATEX FREE 67000

## (undated) DEVICE — DRAPE LAP PEDS 89209

## (undated) DEVICE — ESU PENCIL SMOKE EVAC W/ROCKER SWITCH 0703-047-000

## (undated) DEVICE — PACK MINOR SBA15MIFSE

## (undated) DEVICE — SPONGE LAP 18X18" 1515

## (undated) DEVICE — SU DERMABOND PRINEO 42CM CLR422US

## (undated) DEVICE — NEEDLE SPINAL DISP 22GA X 3.5" QUINCKE 333320

## (undated) DEVICE — GLOVE BIOGEL PI MICRO SZ 7.0 48570

## (undated) DEVICE — DRAPE IOBAN INCISE 23X17" 6650EZ

## (undated) DEVICE — DRSG ABDOMINAL 07 1/2X8" 7197D

## (undated) DEVICE — DRSG TEGADERM 2 3/8X2 3/4" 1624W

## (undated) DEVICE — GLOVE BIOGEL PI ULTRATOUCH G SZ 7.5 42175

## (undated) DEVICE — SOL NACL 0.9% IRRIG 1000ML BOTTLE 07138-09

## (undated) DEVICE — PACK SET-UP STD 9102

## (undated) DEVICE — BNDG ABDOMINAL BINDER 9X62-84" 79-89210

## (undated) DEVICE — MARKER SKIN DOUBLE TIP W/FLEXI-RULER W/LABELS

## (undated) DEVICE — SU ETHIBOND 0 CT-1 CR 8X18" CX21D

## (undated) DEVICE — KIT ENDO TURNOVER/PROCEDURE CARRY-ON 101822

## (undated) DEVICE — BLADE KNIFE SURG 10 371110

## (undated) RX ORDER — PROPOFOL 10 MG/ML
INJECTION, EMULSION INTRAVENOUS
Status: DISPENSED
Start: 2024-01-26

## (undated) RX ORDER — BUPIVACAINE HYDROCHLORIDE 2.5 MG/ML
INJECTION, SOLUTION INFILTRATION; PERINEURAL
Status: DISPENSED
Start: 2024-01-26

## (undated) RX ORDER — PROPOFOL 10 MG/ML
INJECTION, EMULSION INTRAVENOUS
Status: DISPENSED
Start: 2024-09-20

## (undated) RX ORDER — LIDOCAINE HYDROCHLORIDE 10 MG/ML
INJECTION, SOLUTION EPIDURAL; INFILTRATION; INTRACAUDAL; PERINEURAL
Status: DISPENSED
Start: 2024-09-20

## (undated) RX ORDER — KETOROLAC TROMETHAMINE 30 MG/ML
INJECTION, SOLUTION INTRAMUSCULAR; INTRAVENOUS
Status: DISPENSED
Start: 2024-01-26

## (undated) RX ORDER — FENTANYL CITRATE 50 UG/ML
INJECTION, SOLUTION INTRAMUSCULAR; INTRAVENOUS
Status: DISPENSED
Start: 2024-09-20

## (undated) RX ORDER — CEFAZOLIN SODIUM 2 G/50ML
SOLUTION INTRAVENOUS
Status: DISPENSED
Start: 2024-01-26

## (undated) RX ORDER — ONDANSETRON 2 MG/ML
INJECTION INTRAMUSCULAR; INTRAVENOUS
Status: DISPENSED
Start: 2024-01-26

## (undated) RX ORDER — HEPARIN SODIUM 5000 [USP'U]/.5ML
INJECTION, SOLUTION INTRAVENOUS; SUBCUTANEOUS
Status: DISPENSED
Start: 2024-01-26

## (undated) RX ORDER — OXYCODONE HYDROCHLORIDE 5 MG/1
TABLET ORAL
Status: DISPENSED
Start: 2024-01-26

## (undated) RX ORDER — DEXMEDETOMIDINE HYDROCHLORIDE 4 UG/ML
INJECTION, SOLUTION INTRAVENOUS
Status: DISPENSED
Start: 2024-01-26

## (undated) RX ORDER — FENTANYL CITRATE 50 UG/ML
INJECTION, SOLUTION INTRAMUSCULAR; INTRAVENOUS
Status: DISPENSED
Start: 2024-01-26

## (undated) RX ORDER — SEVOFLURANE 250 ML/250ML
LIQUID RESPIRATORY (INHALATION)
Status: DISPENSED
Start: 2024-09-20

## (undated) RX ORDER — ACETAMINOPHEN 325 MG/1
TABLET ORAL
Status: DISPENSED
Start: 2024-01-26

## (undated) RX ORDER — DEXAMETHASONE SODIUM PHOSPHATE 4 MG/ML
INJECTION, SOLUTION INTRA-ARTICULAR; INTRALESIONAL; INTRAMUSCULAR; INTRAVENOUS; SOFT TISSUE
Status: DISPENSED
Start: 2024-01-26

## (undated) RX ORDER — BUPIVACAINE HYDROCHLORIDE 5 MG/ML
INJECTION, SOLUTION EPIDURAL; INTRACAUDAL
Status: DISPENSED
Start: 2024-01-26

## (undated) RX ORDER — FENTANYL CITRATE 0.05 MG/ML
INJECTION, SOLUTION INTRAMUSCULAR; INTRAVENOUS
Status: DISPENSED
Start: 2024-01-26

## (undated) RX ORDER — TRANEXAMIC ACID 100 MG/ML
INJECTION, SOLUTION INTRAVENOUS
Status: DISPENSED
Start: 2024-01-26

## (undated) RX ORDER — EPINEPHRINE 1 MG/ML
INJECTION, SOLUTION INTRAMUSCULAR; SUBCUTANEOUS
Status: DISPENSED
Start: 2024-01-26